# Patient Record
Sex: FEMALE | Race: WHITE | NOT HISPANIC OR LATINO | URBAN - METROPOLITAN AREA
[De-identification: names, ages, dates, MRNs, and addresses within clinical notes are randomized per-mention and may not be internally consistent; named-entity substitution may affect disease eponyms.]

---

## 2018-02-01 DIAGNOSIS — I10 ESSENTIAL HYPERTENSION: Primary | ICD-10-CM

## 2018-02-01 DIAGNOSIS — E78.5 HYPERLIPIDEMIA, UNSPECIFIED HYPERLIPIDEMIA TYPE: ICD-10-CM

## 2018-02-01 RX ORDER — ATORVASTATIN CALCIUM 20 MG/1
TABLET, FILM COATED ORAL
Qty: 90 TABLET | Refills: 0 | Status: SHIPPED | OUTPATIENT
Start: 2018-02-01 | End: 2018-05-08 | Stop reason: SDUPTHER

## 2018-02-01 RX ORDER — METFORMIN HYDROCHLORIDE 500 MG/1
TABLET ORAL
Qty: 180 TABLET | Refills: 0 | Status: SHIPPED | OUTPATIENT
Start: 2018-02-01 | End: 2018-05-08 | Stop reason: SDUPTHER

## 2018-03-01 ENCOUNTER — APPOINTMENT (RX ONLY)
Dept: URBAN - METROPOLITAN AREA CLINIC 61 | Facility: CLINIC | Age: 73
Setting detail: DERMATOLOGY
End: 2018-03-01

## 2018-03-01 DIAGNOSIS — L70.0 ACNE VULGARIS: ICD-10-CM

## 2018-03-01 PROCEDURE — ? MICRODERMABRASION

## 2018-03-01 ASSESSMENT — LOCATION DETAILED DESCRIPTION DERM
LOCATION DETAILED: RIGHT INFERIOR CENTRAL MALAR CHEEK
LOCATION DETAILED: LEFT INFERIOR CENTRAL MALAR CHEEK
LOCATION DETAILED: LEFT LOWER CUTANEOUS LIP
LOCATION DETAILED: SUPERIOR MID FOREHEAD

## 2018-03-01 ASSESSMENT — LOCATION SIMPLE DESCRIPTION DERM
LOCATION SIMPLE: SUPERIOR FOREHEAD
LOCATION SIMPLE: LEFT CHEEK
LOCATION SIMPLE: RIGHT CHEEK
LOCATION SIMPLE: LEFT LIP

## 2018-03-01 ASSESSMENT — LOCATION ZONE DERM
LOCATION ZONE: LIP
LOCATION ZONE: FACE

## 2018-03-01 NOTE — PROCEDURE: MICRODERMABRASION
Price (Use Numbers Only, No Special Characters Or $): [address]
Number Of Passes: 3
Wand: Medium
Consent: Written consent obtained, risks reviewed including but not limited to crusting, scabbing, blistering, scarring, darker or lighter pigmentary change, bruising, and/or incomplete response.
Endpoint: mild erythema
Prep Text: The patients skin was cleaned and prepped.
Detail Level: Zone
Indication: acne
Post-Care Instructions: I reviewed with the patient in detail post-care instructions. Patient should stay away from the sun and wear sun protection until treated areas are fully healed.
Vacuum Pressure: [address]
Treatment Number: 1
Vacuum Pressure Units: inches Hg

## 2018-03-14 ENCOUNTER — APPOINTMENT (RX ONLY)
Dept: URBAN - METROPOLITAN AREA CLINIC 61 | Facility: CLINIC | Age: 73
Setting detail: DERMATOLOGY
End: 2018-03-14

## 2018-03-14 DIAGNOSIS — L81.9 DISORDER OF PIGMENTATION, UNSPECIFIED: ICD-10-CM

## 2018-03-14 DIAGNOSIS — L70.0 ACNE VULGARIS: ICD-10-CM

## 2018-03-14 DIAGNOSIS — L81.4 OTHER MELANIN HYPERPIGMENTATION: ICD-10-CM

## 2018-03-14 PROCEDURE — ? CHEMICAL PEEL JESSNER/TCA

## 2018-03-14 PROCEDURE — 99213 OFFICE O/P EST LOW 20 MIN: CPT

## 2018-03-14 PROCEDURE — ? SEPARATE AND IDENTIFIABLE DOCUMENTATION

## 2018-03-14 PROCEDURE — ? COUNSELING

## 2018-03-14 NOTE — PROCEDURE: CHEMICAL PEEL JESSNER/TCA
Number Of Coats: 3
Time (Mins): 2
Post Peel Care: After the procedure, the treatment area was washed with soap and water, and a post-peel cream was applied. Sun protection and post-care instructions were reviewed with the patient.
Erythema: mild
Post-Care Instructions: I reviewed with the patient in detail post-care instructions. Patient should avoid sun exposure and wear sun protection.  Given strength 15% by  Ady Escobedo
Chemical Peel: 10% TCA
Detail Level: Zone
Consent: Prior to the procedure, written consent was obtained and risks were reviewed, including but not limited to: redness, peeling, blistering, pigmentary change, scarring, infection, and pain.
Frost (0,1+,2+,3+,4+): 0
Prep: The treated area was degreased with pre-peel cleanser, and vaseline was applied for protection of mucous membranes.

## 2018-03-19 DIAGNOSIS — I10 ESSENTIAL HYPERTENSION: Primary | ICD-10-CM

## 2018-03-22 RX ORDER — LOSARTAN POTASSIUM 50 MG/1
TABLET ORAL
Qty: 90 TABLET | Refills: 0 | Status: SHIPPED | OUTPATIENT
Start: 2018-03-22 | End: 2018-06-19 | Stop reason: SDUPTHER

## 2018-04-11 ENCOUNTER — APPOINTMENT (RX ONLY)
Dept: URBAN - METROPOLITAN AREA CLINIC 61 | Facility: CLINIC | Age: 73
Setting detail: DERMATOLOGY
End: 2018-04-11

## 2018-04-11 DIAGNOSIS — L81.1 CHLOASMA: ICD-10-CM

## 2018-04-11 PROCEDURE — ? COUNSELING

## 2018-04-11 PROCEDURE — ? TCA CHEMICAL PEEL

## 2018-04-11 PROCEDURE — 99213 OFFICE O/P EST LOW 20 MIN: CPT

## 2018-04-11 PROCEDURE — ? SEPARATE AND IDENTIFIABLE DOCUMENTATION

## 2018-04-11 NOTE — PROCEDURE: TCA CHEMICAL PEEL
Chemical Peel: 15% TCA
Time (Mins): 4
Consent: Prior to the procedure, written consent was obtained and risks were reviewed, including but not limited to: redness, peeling, blistering, pigmentary change, scarring, infection, and pain.
Prep: The treated area was degreased with pre-peel cleanser, and vaseline was applied for protection of mucous membranes.
Frost (0,1+,2+,3+,4+): 0
Number Of Coats: 3
Detail Level: Zone
Post-Care Instructions: I reviewed with the patient in detail post-care instructions. Patient should avoid sun exposure and wear sun protection.
Post Peel Care: After the procedure, the treatment area was washed with soap and water, and a post-peel cream was applied. Sun protection and post-care instructions were reviewed with the patient.

## 2018-04-24 DIAGNOSIS — E78.5 HYPERLIPIDEMIA, UNSPECIFIED HYPERLIPIDEMIA TYPE: ICD-10-CM

## 2018-04-24 DIAGNOSIS — E11.9 TYPE 2 DIABETES MELLITUS WITHOUT COMPLICATION, WITHOUT LONG-TERM CURRENT USE OF INSULIN (CMS/HCC): Primary | ICD-10-CM

## 2018-05-01 ENCOUNTER — APPOINTMENT (OUTPATIENT)
Dept: LAB | Facility: CLINIC | Age: 73
End: 2018-05-01
Payer: MEDICARE

## 2018-05-01 ENCOUNTER — OFFICE VISIT (OUTPATIENT)
Dept: FAMILY MEDICINE | Facility: CLINIC | Age: 73
End: 2018-05-01
Payer: MEDICARE

## 2018-05-01 VITALS
DIASTOLIC BLOOD PRESSURE: 78 MMHG | HEART RATE: 66 BPM | WEIGHT: 118 LBS | SYSTOLIC BLOOD PRESSURE: 132 MMHG | BODY MASS INDEX: 21.71 KG/M2 | RESPIRATION RATE: 14 BRPM | TEMPERATURE: 98.5 F | HEIGHT: 62 IN

## 2018-05-01 DIAGNOSIS — D50.8 OTHER IRON DEFICIENCY ANEMIA: ICD-10-CM

## 2018-05-01 DIAGNOSIS — E11.9 TYPE 2 DIABETES MELLITUS WITHOUT COMPLICATION, WITHOUT LONG-TERM CURRENT USE OF INSULIN (CMS/HCC): ICD-10-CM

## 2018-05-01 DIAGNOSIS — I10 ESSENTIAL HYPERTENSION: Primary | ICD-10-CM

## 2018-05-01 DIAGNOSIS — E78.2 MIXED HYPERLIPIDEMIA: ICD-10-CM

## 2018-05-01 DIAGNOSIS — E78.5 HYPERLIPIDEMIA, UNSPECIFIED HYPERLIPIDEMIA TYPE: ICD-10-CM

## 2018-05-01 PROBLEM — L57.0 ACTINIC KERATOSIS: Status: ACTIVE | Noted: 2018-05-01

## 2018-05-01 PROBLEM — J30.2 SEASONAL ALLERGIC RHINITIS: Status: ACTIVE | Noted: 2018-05-01

## 2018-05-01 PROBLEM — E55.9 VITAMIN D DEFICIENCY: Status: ACTIVE | Noted: 2018-05-01

## 2018-05-01 LAB
CHOLEST SERPL-MCNC: 114 MG/DL (ref 0–199)
EST. AVERAGE GLUCOSE BLD GHB EST-MCNC: 119.8 MG/DL
FERRITIN SERPL-MCNC: 17 NG/ML (ref 11–307)
HBA1C MFR BLD: 5.8 % (ref 4–6)
HDLC SERPL-MCNC: 67 MG/DL
IRON SERPL-MCNC: 42 UG/DL (ref 50–175)
LDLC SERPL CALC-MCNC: 24 MG/DL (ref 0–99)
TRIGL SERPL-MCNC: 113 MG/DL

## 2018-05-01 PROCEDURE — 83036 HEMOGLOBIN GLYCOSYLATED A1C: CPT | Mod: PO

## 2018-05-01 PROCEDURE — 99213 OFFICE O/P EST LOW 20 MIN: CPT | Mod: PO | Performed by: NURSE PRACTITIONER

## 2018-05-01 PROCEDURE — 83540 ASSAY OF IRON: CPT

## 2018-05-01 PROCEDURE — 80061 LIPID PANEL: CPT | Mod: PO

## 2018-05-01 PROCEDURE — 36415 COLL VENOUS BLD VENIPUNCTURE: CPT | Mod: PO

## 2018-05-01 PROCEDURE — 99213 OFFICE O/P EST LOW 20 MIN: CPT | Performed by: NURSE PRACTITIONER

## 2018-05-01 PROCEDURE — 82728 ASSAY OF FERRITIN: CPT

## 2018-05-01 RX ORDER — PSYLLIUM HUSK 0.4 G
1 CAPSULE ORAL DAILY
COMMUNITY
End: 2020-05-19

## 2018-05-01 RX ORDER — CHOLECALCIFEROL (VITAMIN D3) 25 MCG
2000 TABLET ORAL DAILY
COMMUNITY

## 2018-05-01 ASSESSMENT — ENCOUNTER SYMPTOMS
APPETITE CHANGE: 0
HEADACHES: 0
SORE THROAT: 0
ABDOMINAL DISTENTION: 0
ACTIVITY CHANGE: 0
FATIGUE: 0
SLEEP DISTURBANCE: 0
COUGH: 0
DIZZINESS: 0
FREQUENCY: 0

## 2018-05-01 ASSESSMENT — PAIN SCALES - GENERAL: PAINLEVEL: 0-NO PAIN

## 2018-05-01 NOTE — PROGRESS NOTES
HPI  She feels good, had stressful winter in [place] with relatives and their health.  Had labs done, A1C 5.8%.  Her mom is 94 and starting to have multiple problems.     ROS:  Review of Systems   Constitutional: Negative for activity change, appetite change and fatigue.   HENT: Negative for dental problem and sore throat.    Respiratory: Negative for cough.    Cardiovascular: Negative for leg swelling.   Gastrointestinal: Negative for abdominal distention.   Genitourinary: Negative for frequency and urgency.   Neurological: Negative for dizziness and headaches.   Psychiatric/Behavioral: Negative for sleep disturbance.    Feels good, denies SOB      PHYSICAL EXAM  Physical Exam  She looks well, skin warm and dry, HRR, lungs CTA, no edema, thyroid soft and nontender      DISCUSSION:  we should obtain a serum iron and ferritin level.  That has been added.  Discussion of her lipid panel and A1C.

## 2018-05-08 DIAGNOSIS — I10 ESSENTIAL HYPERTENSION: ICD-10-CM

## 2018-05-08 DIAGNOSIS — E78.5 HYPERLIPIDEMIA, UNSPECIFIED HYPERLIPIDEMIA TYPE: ICD-10-CM

## 2018-05-10 RX ORDER — METFORMIN HYDROCHLORIDE 500 MG/1
TABLET ORAL
Qty: 180 TABLET | Refills: 0 | Status: SHIPPED | OUTPATIENT
Start: 2018-05-10 | End: 2018-08-03 | Stop reason: SDUPTHER

## 2018-05-10 RX ORDER — ATORVASTATIN CALCIUM 20 MG/1
TABLET, FILM COATED ORAL
Qty: 90 TABLET | Refills: 0 | Status: SHIPPED | OUTPATIENT
Start: 2018-05-10 | End: 2018-08-03 | Stop reason: SDUPTHER

## 2018-06-19 DIAGNOSIS — I10 ESSENTIAL HYPERTENSION: ICD-10-CM

## 2018-06-20 RX ORDER — LOSARTAN POTASSIUM 50 MG/1
TABLET ORAL
Qty: 90 TABLET | Refills: 0 | Status: SHIPPED | OUTPATIENT
Start: 2018-06-20 | End: 2018-09-12 | Stop reason: SDUPTHER

## 2018-08-03 DIAGNOSIS — I10 ESSENTIAL HYPERTENSION: ICD-10-CM

## 2018-08-03 DIAGNOSIS — E78.5 HYPERLIPIDEMIA, UNSPECIFIED HYPERLIPIDEMIA TYPE: ICD-10-CM

## 2018-08-06 RX ORDER — METFORMIN HYDROCHLORIDE 500 MG/1
TABLET ORAL
Qty: 180 TABLET | Refills: 1 | Status: SHIPPED | OUTPATIENT
Start: 2018-08-06 | End: 2019-02-01 | Stop reason: SDUPTHER

## 2018-08-06 RX ORDER — ATORVASTATIN CALCIUM 20 MG/1
TABLET, FILM COATED ORAL
Qty: 90 TABLET | Refills: 1 | Status: SHIPPED | OUTPATIENT
Start: 2018-08-06 | End: 2019-02-01 | Stop reason: SDUPTHER

## 2018-09-12 DIAGNOSIS — I10 ESSENTIAL HYPERTENSION: ICD-10-CM

## 2018-09-12 RX ORDER — LOSARTAN POTASSIUM 50 MG/1
TABLET ORAL
Qty: 90 TABLET | Refills: 0 | Status: SHIPPED | OUTPATIENT
Start: 2018-09-12 | End: 2018-12-04 | Stop reason: SDUPTHER

## 2018-09-24 ENCOUNTER — TELEPHONE (OUTPATIENT)
Dept: FAMILY MEDICINE | Facility: CLINIC | Age: 73
End: 2018-09-24

## 2018-09-24 DIAGNOSIS — Z13.228 SCREENING FOR METABOLIC DISORDER: ICD-10-CM

## 2018-09-24 DIAGNOSIS — Z13.0 SCREENING, ANEMIA, DEFICIENCY, IRON: ICD-10-CM

## 2018-09-24 DIAGNOSIS — Z13.0 SCREENING FOR IRON DEFICIENCY ANEMIA: Primary | ICD-10-CM

## 2018-10-09 ENCOUNTER — APPOINTMENT (OUTPATIENT)
Dept: LAB | Facility: CLINIC | Age: 73
End: 2018-10-09
Payer: MEDICARE

## 2018-10-09 ENCOUNTER — OFFICE VISIT (OUTPATIENT)
Dept: FAMILY MEDICINE | Facility: CLINIC | Age: 73
End: 2018-10-09
Payer: MEDICARE

## 2018-10-09 VITALS
SYSTOLIC BLOOD PRESSURE: 102 MMHG | HEIGHT: 62 IN | WEIGHT: 114 LBS | DIASTOLIC BLOOD PRESSURE: 60 MMHG | TEMPERATURE: 98.8 F | HEART RATE: 90 BPM | RESPIRATION RATE: 14 BRPM | BODY MASS INDEX: 20.98 KG/M2

## 2018-10-09 DIAGNOSIS — Z13.0 SCREENING FOR IRON DEFICIENCY ANEMIA: ICD-10-CM

## 2018-10-09 DIAGNOSIS — R10.31 RLQ ABDOMINAL PAIN: Primary | ICD-10-CM

## 2018-10-09 DIAGNOSIS — Z13.228 SCREENING FOR METABOLIC DISORDER: ICD-10-CM

## 2018-10-09 DIAGNOSIS — D50.9 IRON DEFICIENCY ANEMIA, UNSPECIFIED IRON DEFICIENCY ANEMIA TYPE: ICD-10-CM

## 2018-10-09 DIAGNOSIS — I10 ESSENTIAL HYPERTENSION: ICD-10-CM

## 2018-10-09 DIAGNOSIS — Z13.0 SCREENING, ANEMIA, DEFICIENCY, IRON: ICD-10-CM

## 2018-10-09 DIAGNOSIS — E78.2 MIXED HYPERLIPIDEMIA: ICD-10-CM

## 2018-10-09 DIAGNOSIS — E11.9 TYPE 2 DIABETES MELLITUS WITHOUT COMPLICATION, WITHOUT LONG-TERM CURRENT USE OF INSULIN (CMS/HCC): ICD-10-CM

## 2018-10-09 LAB
ALBUMIN SERPL-MCNC: 3.8 G/DL (ref 3.5–5.3)
ALP SERPL-CCNC: 74 U/L (ref 55–142)
ALT SERPL-CCNC: 7 U/L (ref 0–52)
ANION GAP SERPL CALC-SCNC: 9 MMOL/L (ref 3–11)
AST SERPL-CCNC: 11 U/L (ref 0–39)
BASOPHILS # BLD AUTO: 0 10*3/UL
BASOPHILS NFR BLD AUTO: 1 % (ref 0–2)
BILIRUB SERPL-MCNC: 0.36 MG/DL (ref 0–1.4)
BUN SERPL-MCNC: 15 MG/DL (ref 7–25)
CALCIUM ALBUM COR SERPL-MCNC: 9.4 MG/DL (ref 8.5–10.1)
CALCIUM SERPL-MCNC: 9.2 MG/DL (ref 8.6–10.3)
CHLORIDE SERPL-SCNC: 104 MMOL/L (ref 98–107)
CO2 SERPL-SCNC: 25 MMOL/L (ref 21–32)
CREAT SERPL-MCNC: 0.58 MG/DL (ref 0.6–1.2)
EOSINOPHIL # BLD AUTO: 0.3 10*3/UL
EOSINOPHIL NFR BLD AUTO: 5 % (ref 0–3)
ERYTHROCYTE [DISTWIDTH] IN BLOOD BY AUTOMATED COUNT: 14 % (ref 11.5–14)
FERRITIN SERPL-MCNC: 9.4 NG/ML (ref 11–307)
GFR SERPL CREATININE-BSD FRML MDRD: 102 ML/MIN/1.73M*2
GLUCOSE SERPL-MCNC: 112 MG/DL (ref 70–105)
HCT VFR BLD AUTO: 35.2 % (ref 34–45)
HGB BLD-MCNC: 11.6 G/DL (ref 11.5–15.5)
HYPOCHROMIA PRESENCE IN BLOOD, ANALYZER: ABNORMAL
IRON SERPL-MCNC: 22 UG/DL (ref 50–175)
LYMPHOCYTES # BLD AUTO: 1.5 10*3/UL
LYMPHOCYTES NFR BLD AUTO: 23 % (ref 11–47)
MCH RBC QN AUTO: 26.4 PG (ref 28–33)
MCHC RBC AUTO-ENTMCNC: 32.9 G/DL (ref 32–36)
MCV RBC AUTO: 80.3 FL (ref 81–97)
MONOCYTES # BLD AUTO: 0.5 10*3/UL
MONOCYTES NFR BLD AUTO: 8 % (ref 3–11)
NEUTROPHILS # BLD AUTO: 4.1 10*3/UL
NEUTROPHILS NFR BLD AUTO: 64 % (ref 41–81)
PLATELET # BLD AUTO: 356 10*3/UL (ref 140–350)
PMV BLD AUTO: 6.7 FL (ref 6.9–10.8)
POTASSIUM SERPL-SCNC: 3.7 MMOL/L (ref 3.5–5.1)
PROT SERPL-MCNC: 6.7 G/DL (ref 6–8.3)
RBC # BLD AUTO: 4.38 10*6/ΜL (ref 3.7–5.3)
SODIUM SERPL-SCNC: 138 MMOL/L (ref 135–145)
WBC # BLD AUTO: 6.5 10*3/UL (ref 4.5–10.5)

## 2018-10-09 PROCEDURE — 82728 ASSAY OF FERRITIN: CPT

## 2018-10-09 PROCEDURE — 80053 COMPREHEN METABOLIC PANEL: CPT | Mod: PO

## 2018-10-09 PROCEDURE — 85025 COMPLETE CBC W/AUTO DIFF WBC: CPT | Mod: PO

## 2018-10-09 PROCEDURE — 99214 OFFICE O/P EST MOD 30 MIN: CPT | Performed by: NURSE PRACTITIONER

## 2018-10-09 PROCEDURE — 99214 OFFICE O/P EST MOD 30 MIN: CPT | Mod: PO | Performed by: NURSE PRACTITIONER

## 2018-10-09 PROCEDURE — 83540 ASSAY OF IRON: CPT

## 2018-10-09 PROCEDURE — 36415 COLL VENOUS BLD VENIPUNCTURE: CPT | Mod: PO

## 2018-10-09 ASSESSMENT — PAIN SCALES - GENERAL: PAINLEVEL: 4

## 2018-10-09 NOTE — PROGRESS NOTES
"VITAL SIGNS  /60 (BP Location: Left arm, Patient Position: Sitting, Cuff Size: Reg)   Pulse 90   Temp 37.1 °C (98.8 °F) (Temporal)   Resp 14   Ht 1.575 m (5' 2\")   Wt 51.7 kg (114 lb)   BMI 20.85 kg/m²   ALLERGIES  Patient has no known allergies.  MEDICATIONS    Current Outpatient Prescriptions:   •  atorvastatin (LIPITOR) 20 mg tablet, TAKE 1 TABLET BY MOUTH EVERY DAY, Disp: 90 tablet, Rfl: 1  •  blood-glucose meter (ACCU-CHEK JOSE MANUEL) misc, D, Disp: 1, Rfl: 0  •  cholecalciferol, vitamin D3, (cholecalciferol) 1,000 unit tablet, Take 2,000 Units by mouth daily., Disp: , Rfl:   •  ferrous sulfate (SLOW FE) 142 mg (45 mg iron) tablet extended release, Take 1 tablet by mouth daily., Disp: , Rfl:   •  lancets (ACCU-CHEK FASTCLIX) [facility], USE BID TO TEST BLOOD SUGAR LEVELS, Disp: 204, Rfl: 1  •  lancing device with lancets (ACCU-CHEK FASTCLIX) kit, Use as directed to check blood sugars twice daily., Disp: 100 each, Rfl: 3  •  losartan (COZAAR) 50 mg tablet, TAKE 1 TABLET BY MOUTH EVERY DAY, Disp: 90 tablet, Rfl: 0  •  metFORMIN (GLUCOPHAGE) 500 mg tablet, TAKE 1 TABLET BY MOUTH TWICE DAILY WITH MEALS, Disp: 180 tablet, Rfl: 1    HPI  Has a knot in RL abdomen.  Sore all the time, some days worse than others.  Worse with a bigger meal.  Taking two ibuprofen per day which seems to help the swelling. Noticed the knot about 2 months ago, noticed larger progressively.  Very concerned    Taking Slow Fe daily, not with vitamin C.  Iron has been low for about a year, was 28, then increased to 63, dropped to 42 5 months ago and now 22.  Ferritin was 4 a year ago, came up to 17 and is now below normal at 9.4.  Her MC has dropped to 80.3 and MCH to 26.4 but H and H barely normal at 11.6 and 35.2  She denies blood in urine, stools dark from iron, no blood noted.  Bowels have not changed, loose to firm.  Sometimes hurts after she has a BM.  Feels like she gets full faster for the last couple of months.  Denies fever.  "       ROS:  Review of Systems Denies feeling cold, SOB, doesn't feel heart beating fast.  No dizziness with standing up.        PHYSICAL EXAM  Physical Exam  She looks well, skin warm and dry, HRR, lungs CTA, abdomen is tender entire right side to palpation.      DISCUSSION: CT of abdomen.  Ask Dr Valdivia to look at labs, anemia.  Could do CT on Friday.  Creatinine was 0.58.  Will start taking iron with vitamin C.

## 2018-10-12 ENCOUNTER — HOSPITAL ENCOUNTER (OUTPATIENT)
Dept: CT IMAGING | Facility: HOSPITAL | Age: 73
Discharge: HOME | End: 2018-10-12
Payer: MEDICARE

## 2018-10-12 PROCEDURE — 2550000100 HC RX 255: Performed by: NURSE PRACTITIONER

## 2018-10-12 PROCEDURE — 74177 CT ABD & PELVIS W/CONTRAST: CPT

## 2018-10-12 PROCEDURE — 74177 CT ABD & PELVIS W/CONTRAST: CPT | Mod: 26 | Performed by: RADIOLOGY

## 2018-10-12 RX ORDER — IOPAMIDOL 755 MG/ML
70 INJECTION, SOLUTION INTRAVASCULAR ONCE
Status: COMPLETED | OUTPATIENT
Start: 2018-10-12 | End: 2018-10-12

## 2018-10-12 RX ADMIN — IOPAMIDOL 70 ML: 755 INJECTION, SOLUTION INTRAVENOUS at 10:40

## 2018-10-15 ENCOUNTER — TELEPHONE (OUTPATIENT)
Dept: FAMILY MEDICINE | Facility: CLINIC | Age: 73
End: 2018-10-15

## 2018-10-17 ENCOUNTER — TELEPHONE (OUTPATIENT)
Dept: FAMILY MEDICINE | Facility: CLINIC | Age: 73
End: 2018-10-17

## 2018-10-18 ENCOUNTER — CONSULT (OUTPATIENT)
Dept: SURGERY | Facility: CLINIC | Age: 73
End: 2018-10-18
Payer: MEDICARE

## 2018-10-18 ENCOUNTER — APPOINTMENT (OUTPATIENT)
Dept: LAB | Facility: CLINIC | Age: 73
End: 2018-10-18
Payer: MEDICARE

## 2018-10-18 VITALS
HEART RATE: 80 BPM | HEIGHT: 62 IN | RESPIRATION RATE: 16 BRPM | WEIGHT: 114 LBS | BODY MASS INDEX: 20.98 KG/M2 | DIASTOLIC BLOOD PRESSURE: 74 MMHG | TEMPERATURE: 98.8 F | SYSTOLIC BLOOD PRESSURE: 126 MMHG

## 2018-10-18 DIAGNOSIS — C18.0 MALIGNANT NEOPLASM OF CECUM (CMS/HCC): ICD-10-CM

## 2018-10-18 DIAGNOSIS — K63.9 DISEASE OF INTESTINE: Primary | ICD-10-CM

## 2018-10-18 DIAGNOSIS — K63.89 MASS OF CECUM: ICD-10-CM

## 2018-10-18 DIAGNOSIS — Z01.818 PREOP TESTING: ICD-10-CM

## 2018-10-18 LAB — CEA SERPL-MCNC: 1 NG/ML (ref 0–9.9)

## 2018-10-18 PROCEDURE — 82378 CARCINOEMBRYONIC ANTIGEN: CPT | Performed by: SURGERY

## 2018-10-18 PROCEDURE — 93005 ELECTROCARDIOGRAM TRACING: CPT | Performed by: SURGERY

## 2018-10-18 PROCEDURE — 36415 COLL VENOUS BLD VENIPUNCTURE: CPT | Performed by: SURGERY

## 2018-10-18 PROCEDURE — 99202 OFFICE O/P NEW SF 15 MIN: CPT | Performed by: SURGERY

## 2018-10-18 RX ORDER — VIT C/E/ZN/COPPR/LUTEIN/ZEAXAN 250MG-90MG
1 CAPSULE ORAL DAILY
COMMUNITY
End: 2018-10-18 | Stop reason: SDUPTHER

## 2018-10-18 ASSESSMENT — ENCOUNTER SYMPTOMS
ANAL BLEEDING: 0
CARDIOVASCULAR NEGATIVE: 1
VOMITING: 0
DIARRHEA: 1
NAUSEA: 0
CONSTIPATION: 1
ABDOMINAL PAIN: 1
ABDOMINAL DISTENTION: 1
RESPIRATORY NEGATIVE: 1
CONSTITUTIONAL NEGATIVE: 1
BLOOD IN STOOL: 0
RECTAL PAIN: 0

## 2018-10-18 NOTE — PROGRESS NOTES
History and Physical    10/18/18  3:51 PM    Chief Complaint   Patient presents with   • Mass     in colon on CT       HPI  Shama Caballero is a 73 y.o. female who presents with right lower quadrant pain and what she perceives as a palpable mass for the last several months.  Her iron has been low so she has been taking iron supplementation thus her stools have been dark.  She is taking quite a bit of ibuprofen to try to help with the pain    She is never had a colonoscopy.  No laly blood in her stools that she is noticed.  No fevers or chills.  She is down 4 pounds.  Her appetite is the same as it usually is.  Her bowels are very inconsistent but there is been no dramatic change.    Denies chest pain or shortness of breath with activities    There is no family history of colon cancer.    Past Medical History:   Diagnosis Date   • Diabetes mellitus (CMS/HCC) (HCC)    • Hypertension        Past Surgical History:   Procedure Laterality Date   • CATARACT EXTRACTION, BILATERAL     • HYSTERECTOMY     • TONSILLECTOMY         Family History   Problem Relation Age of Onset   • Hypertension Mother    • Hypertension Father        Social History     Social History   • Marital status:      Spouse name: N/A   • Number of children: N/A   • Years of education: N/A     Occupational History   • Not on file.     Social History Main Topics   • Smoking status: Never Smoker   • Smokeless tobacco: Never Used   • Alcohol use 4.2 oz/week     7 Glasses of wine per week   • Drug use: Unknown   • Sexual activity: Not on file     Other Topics Concern   • Not on file     Social History Narrative   • No narrative on file       No Known Allergies    Current Outpatient Prescriptions   Medication Sig Dispense Refill   • atorvastatin (LIPITOR) 20 mg tablet TAKE 1 TABLET BY MOUTH EVERY DAY 90 tablet 1   • blood-glucose meter (ACCU-CHEK JOSE MANUEL) misc FPD 1 0   • cholecalciferol, vitamin D3, (cholecalciferol) 1,000 unit tablet Take 2,000 Units by  mouth daily.     • ferrous sulfate (SLOW FE) 142 mg (45 mg iron) tablet extended release Take 1 tablet by mouth daily.     • lancets (ACCU-CHEK FASTCLIX) [facility] USE BID TO TEST BLOOD SUGAR LEVELS 204 1   • lancing device with lancets (ACCU-CHEK FASTCLIX) kit Use as directed to check blood sugars twice daily. 100 each 3   • metFORMIN (GLUCOPHAGE) 500 mg tablet TAKE 1 TABLET BY MOUTH TWICE DAILY WITH MEALS 180 tablet 1   • losartan (COZAAR) 50 mg tablet TAKE 1 TABLET BY MOUTH EVERY DAY 90 tablet 0     No current facility-administered medications for this visit.        Prior to Admission medications    Medication Sig Start Date End Date Taking? Authorizing Provider   atorvastatin (LIPITOR) 20 mg tablet TAKE 1 TABLET BY MOUTH EVERY DAY 8/6/18  Yes Christina Singh CNP   blood-glucose meter (ACCU-CHEK JOSE MANUEL) misc FPD 5/15/14  Yes Conversion Provider   cholecalciferol, vitamin D3, (cholecalciferol) 1,000 unit tablet Take 2,000 Units by mouth daily.   Yes Historical Provider, MD   ferrous sulfate (SLOW FE) 142 mg (45 mg iron) tablet extended release Take 1 tablet by mouth daily.   Yes Historical Provider, MD   lancets (ACC[facility]-CHEK FASTCLIX) [facility] USE BID TO TEST BLOOD SUGAR LEVELS 8/7/17  Yes Conversion Provider   lancing device with lancets (ACCU-CHEK FASTCLIX) kit Use as directed to check blood sugars twice daily. 11/20/17  Yes Christina Singh CNP   metFORMIN (GLUCOPHAGE) 500 mg tablet TAKE 1 TABLET BY MOUTH TWICE DAILY WITH MEALS 8/6/18  Yes Christina Singh CNP   losartan (COZAAR) 50 mg tablet TAKE 1 TABLET BY MOUTH EVERY DAY 9/12/18   Christina Singh CNP   cholecalciferol, vitamin D3, (VITAMIN D3) 1,000 unit capsule Take 1 capsule by mouth daily.  10/18/18  Historical Provider, MD       Review of Systems   Constitutional: Negative.    Respiratory: Negative.    Cardiovascular: Negative.    Gastrointestinal: Positive for abdominal distention, abdominal pain, constipation and diarrhea. Negative for anal bleeding, blood in stool, nausea,  rectal pain and vomiting.       Temp:  [37.1 °C (98.8 °F)] 37.1 °C (98.8 °F)  Heart Rate:  [80] 80  Resp:  [16] 16  BP: (126)/(74) 126/74    Physical Exam   Constitutional: She appears well-developed and well-nourished.   Cardiovascular: Normal rate, regular rhythm and normal heart sounds.    Pulmonary/Chest: Effort normal and breath sounds normal.   Abdominal: Soft. There is tenderness (Right lower quadrant).   Skin: Skin is warm and dry.   Vitals reviewed.      CBC with Platelet:    Lab Results   Component Value Date    WBC 6.5 10/09/2018    HGB 11.6 10/09/2018    HCT 35.2 10/09/2018     (H) 10/09/2018     Lab Results   Component Value Date    GLUCOSE 112 (H) 10/09/2018    CALCIUM 9.2 10/09/2018     10/09/2018    K 3.7 10/09/2018    CO2 25 10/09/2018     10/09/2018    BUN 15 10/09/2018    CREATININE 0.58 (L) 10/09/2018    ANIONGAP 9 10/09/2018     Magnesium: No results found for: MG  Coags: No results found for: PT, APTT, INR    Ct Abdomen Pelvis With Iv Contrast    Result Date: 10/12/2018  Narrative: Exam: CT of the abdomen and pelvis with contrast from 10/12/2018    Clinical History: RLQ abdominal pain; rlq abdominal pain  anemic Comparison(s): None Technique: Helical axial imaging was performed through the abdomen and pelvis after the intravenous administration of 70 cc of Isovue-370 and with enteric contrast. Coronal and sagittal reformatted images were generated. Dose Reduction Technique: Dose reduction technique utilized; automatic/anatomic modulation of X-ray tube current (Auto mA). Findings: There is dependent atelectasis. The spleen appears grossly unremarkable. There is no adrenal mass. There is diffuse hepatic steatosis. Cholecystectomy. The pancreas appears grossly unremarkable. The kidneys are symmetric in size and shape. There is no hydronephrosis. The abdominal aorta contains mild atherosclerotic calcification without aneurysm. There is no periaortic lymphadenopathy. There is a  small sliding-type hiatal hernia. There is an irregular-shaped circumferential mass of the cecum/proximal ascending colon (axial series 2 image 42). This measures over a length of approximately 6 cm, although is difficult to evaluate. There is a probable area of necrosis within the inferior aspect of the mass (axial series 2 image 46). There are enlarged lymph nodes within the right lower quadrant (axial series 2 image 47). Oral contrast is seen within the descending colon. The bladder appears unremarkable. The uterus is surgically absent. There are calcified pelvic phleboliths. There are surgical clips within the pelvis. There is no inguinal adenopathy. There are degenerative changes of the lumbar spine with facet arthropathy. There is a probable bone island within the L2 and L5 vertebral bodies.     Impression: IMPRESSION: Irregular wall thickening of the cecum and proximal ascending colon. This is most consistent with a colonic neoplasm. This extends over a length of approximately 6 cm. There is lymphadenopathy within the right lower quadrant.      Assessment and Plan  Active Problems:  No Active Problems: There are no active problems currently on the Problem List. Please update the Problem List and refresh.      Assessment/Plan      Abdominal mass    I have personally reviewed her CT scan.  She has an irregular mass/thickening of her cecum and proximal ascending colon.  There is no signs for metastatic disease throughout the abdomen    I told her that we need to urgently proceed to colonoscopy for further diagnosis.  Certainly the #1 differential would be carcinoma.  Second would be possible inflammatory bowel disease.  I discussed both of these options with her today and have let her know that I feel that this is likely a cancer    We are going obtain a CEA and EKG today.  I reviewed her other blood work.  This is in to see a patient of likely surgery.  I did give her printed literature today describing  laparoscopic approach to a right hemicolectomy.  We discussed staging of colon cancers and both local therapy such as surgery and systemic therapy such as chemotherapy.    I have her scheduled for colonoscopy tomorrow.  Risks, benefits, alternatives to this procedure were explained.  Further recommendations pending outcome of biopsies.      DINESH RODRIGUEZ MD

## 2018-10-18 NOTE — H&P (VIEW-ONLY)
History and Physical    10/18/18  3:51 PM    Chief Complaint   Patient presents with   • Mass     in colon on CT       HPI  Shama Caballero is a 73 y.o. female who presents with right lower quadrant pain and what she perceives as a palpable mass for the last several months.  Her iron has been low so she has been taking iron supplementation thus her stools have been dark.  She is taking quite a bit of ibuprofen to try to help with the pain    She is never had a colonoscopy.  No laly blood in her stools that she is noticed.  No fevers or chills.  She is down 4 pounds.  Her appetite is the same as it usually is.  Her bowels are very inconsistent but there is been no dramatic change.    Denies chest pain or shortness of breath with activities    There is no family history of colon cancer.    Past Medical History:   Diagnosis Date   • Diabetes mellitus (CMS/HCC) (HCC)    • Hypertension        Past Surgical History:   Procedure Laterality Date   • CATARACT EXTRACTION, BILATERAL     • HYSTERECTOMY     • TONSILLECTOMY         Family History   Problem Relation Age of Onset   • Hypertension Mother    • Hypertension Father        Social History     Social History   • Marital status:      Spouse name: N/A   • Number of children: N/A   • Years of education: N/A     Occupational History   • Not on file.     Social History Main Topics   • Smoking status: Never Smoker   • Smokeless tobacco: Never Used   • Alcohol use 4.2 oz/week     7 Glasses of wine per week   • Drug use: Unknown   • Sexual activity: Not on file     Other Topics Concern   • Not on file     Social History Narrative   • No narrative on file       No Known Allergies    Current Outpatient Prescriptions   Medication Sig Dispense Refill   • atorvastatin (LIPITOR) 20 mg tablet TAKE 1 TABLET BY MOUTH EVERY DAY 90 tablet 1   • blood-glucose meter (ACCU-CHEK JOSE MANUEL) misc FPD 1 0   • cholecalciferol, vitamin D3, (cholecalciferol) 1,000 unit tablet Take 2,000 Units by  mouth daily.     • ferrous sulfate (SLOW FE) 142 mg (45 mg iron) tablet extended release Take 1 tablet by mouth daily.     • lancets (ACCU-CHEK FASTCLIX) [facility] USE BID TO TEST BLOOD SUGAR LEVELS 204 1   • lancing device with lancets (ACCU-CHEK FASTCLIX) kit Use as directed to check blood sugars twice daily. 100 each 3   • metFORMIN (GLUCOPHAGE) 500 mg tablet TAKE 1 TABLET BY MOUTH TWICE DAILY WITH MEALS 180 tablet 1   • losartan (COZAAR) 50 mg tablet TAKE 1 TABLET BY MOUTH EVERY DAY 90 tablet 0     No current facility-administered medications for this visit.        Prior to Admission medications    Medication Sig Start Date End Date Taking? Authorizing Provider   atorvastatin (LIPITOR) 20 mg tablet TAKE 1 TABLET BY MOUTH EVERY DAY 8/6/18  Yes Christina Singh CNP   blood-glucose meter (ACCU-CHEK JOSE MANUEL) misc FPD 5/15/14  Yes Conversion Provider   cholecalciferol, vitamin D3, (cholecalciferol) 1,000 unit tablet Take 2,000 Units by mouth daily.   Yes Historical Provider, MD   ferrous sulfate (SLOW FE) 142 mg (45 mg iron) tablet extended release Take 1 tablet by mouth daily.   Yes Historical Provider, MD   lancets (ACCU-CHEK FASTCLIX) [facility] USE BID TO TEST BLOOD SUGAR LEVELS 8/7/17  Yes Conversion Provider   lancing device with lancets (ACCU-CHEK FASTCLIX) kit Use as directed to check blood sugars twice daily. 11/20/17  Yes Christina Singh CNP   metFORMIN (GLUCOPHAGE) 500 mg tablet TAKE 1 TABLET BY MOUTH TWICE DAILY WITH MEALS 8/6/18  Yes Christina Singh CNP   losartan (COZAAR) 50 mg tablet TAKE 1 TABLET BY MOUTH EVERY DAY 9/12/18   Christina Singh CNP   cholecalciferol, vitamin D3, (VITAMIN D3) 1,000 unit capsule Take 1 capsule by mouth daily.  10/18/18  Historical Provider, MD       Review of Systems   Constitutional: Negative.    Respiratory: Negative.    Cardiovascular: Negative.    Gastrointestinal: Positive for abdominal distention, abdominal pain, constipation and diarrhea. Negative for anal bleeding, blood in stool, nausea,  rectal pain and vomiting.       Temp:  [37.1 °C (98.8 °F)] 37.1 °C (98.8 °F)  Heart Rate:  [80] 80  Resp:  [16] 16  BP: (126)/(74) 126/74    Physical Exam   Constitutional: She appears well-developed and well-nourished.   Cardiovascular: Normal rate, regular rhythm and normal heart sounds.    Pulmonary/Chest: Effort normal and breath sounds normal.   Abdominal: Soft. There is tenderness (Right lower quadrant).   Skin: Skin is warm and dry.   Vitals reviewed.      CBC with Platelet:    Lab Results   Component Value Date    WBC 6.5 10/09/2018    HGB 11.6 10/09/2018    HCT 35.2 10/09/2018     (H) 10/09/2018     Lab Results   Component Value Date    GLUCOSE 112 (H) 10/09/2018    CALCIUM 9.2 10/09/2018     10/09/2018    K 3.7 10/09/2018    CO2 25 10/09/2018     10/09/2018    BUN 15 10/09/2018    CREATININE 0.58 (L) 10/09/2018    ANIONGAP 9 10/09/2018     Magnesium: No results found for: MG  Coags: No results found for: PT, APTT, INR    Ct Abdomen Pelvis With Iv Contrast    Result Date: 10/12/2018  Narrative: Exam: CT of the abdomen and pelvis with contrast from 10/12/2018    Clinical History: RLQ abdominal pain; rlq abdominal pain  anemic Comparison(s): None Technique: Helical axial imaging was performed through the abdomen and pelvis after the intravenous administration of 70 cc of Isovue-370 and with enteric contrast. Coronal and sagittal reformatted images were generated. Dose Reduction Technique: Dose reduction technique utilized; automatic/anatomic modulation of X-ray tube current (Auto mA). Findings: There is dependent atelectasis. The spleen appears grossly unremarkable. There is no adrenal mass. There is diffuse hepatic steatosis. Cholecystectomy. The pancreas appears grossly unremarkable. The kidneys are symmetric in size and shape. There is no hydronephrosis. The abdominal aorta contains mild atherosclerotic calcification without aneurysm. There is no periaortic lymphadenopathy. There is a  small sliding-type hiatal hernia. There is an irregular-shaped circumferential mass of the cecum/proximal ascending colon (axial series 2 image 42). This measures over a length of approximately 6 cm, although is difficult to evaluate. There is a probable area of necrosis within the inferior aspect of the mass (axial series 2 image 46). There are enlarged lymph nodes within the right lower quadrant (axial series 2 image 47). Oral contrast is seen within the descending colon. The bladder appears unremarkable. The uterus is surgically absent. There are calcified pelvic phleboliths. There are surgical clips within the pelvis. There is no inguinal adenopathy. There are degenerative changes of the lumbar spine with facet arthropathy. There is a probable bone island within the L2 and L5 vertebral bodies.     Impression: IMPRESSION: Irregular wall thickening of the cecum and proximal ascending colon. This is most consistent with a colonic neoplasm. This extends over a length of approximately 6 cm. There is lymphadenopathy within the right lower quadrant.      Assessment and Plan  Active Problems:  No Active Problems: There are no active problems currently on the Problem List. Please update the Problem List and refresh.      Assessment/Plan      Abdominal mass    I have personally reviewed her CT scan.  She has an irregular mass/thickening of her cecum and proximal ascending colon.  There is no signs for metastatic disease throughout the abdomen    I told her that we need to urgently proceed to colonoscopy for further diagnosis.  Certainly the #1 differential would be carcinoma.  Second would be possible inflammatory bowel disease.  I discussed both of these options with her today and have let her know that I feel that this is likely a cancer    We are going obtain a CEA and EKG today.  I reviewed her other blood work.  This is in to see a patient of likely surgery.  I did give her printed literature today describing  laparoscopic approach to a right hemicolectomy.  We discussed staging of colon cancers and both local therapy such as surgery and systemic therapy such as chemotherapy.    I have her scheduled for colonoscopy tomorrow.  Risks, benefits, alternatives to this procedure were explained.  Further recommendations pending outcome of biopsies.      DINESH RODRIGUEZ MD

## 2018-10-19 ENCOUNTER — ANESTHESIA (OUTPATIENT)
Dept: GASTROENTEROLOGY | Facility: HOSPITAL | Age: 73
End: 2018-10-19
Payer: MEDICARE

## 2018-10-19 ENCOUNTER — ANESTHESIA EVENT (OUTPATIENT)
Dept: GASTROENTEROLOGY | Facility: HOSPITAL | Age: 73
End: 2018-10-19
Payer: MEDICARE

## 2018-10-19 ENCOUNTER — HOSPITAL ENCOUNTER (OUTPATIENT)
Facility: HOSPITAL | Age: 73
Setting detail: OUTPATIENT SURGERY
Discharge: HOME | End: 2018-10-19
Attending: SURGERY | Admitting: SURGERY
Payer: MEDICARE

## 2018-10-19 VITALS
RESPIRATION RATE: 16 BRPM | WEIGHT: 114 LBS | SYSTOLIC BLOOD PRESSURE: 113 MMHG | DIASTOLIC BLOOD PRESSURE: 59 MMHG | BODY MASS INDEX: 20.98 KG/M2 | OXYGEN SATURATION: 96 % | HEART RATE: 83 BPM | HEIGHT: 62 IN | TEMPERATURE: 98.1 F

## 2018-10-19 PROBLEM — C18.2 MALIGNANT NEOPLASM OF ASCENDING COLON (CMS/HCC): Status: ACTIVE | Noted: 2018-10-19

## 2018-10-19 LAB — GLUCOSE BLDC GLUCOMTR-MCNC: 101 MG/DL (ref 70–105)

## 2018-10-19 PROCEDURE — 2580000300 HC RX 258: Performed by: SURGERY

## 2018-10-19 PROCEDURE — 88305 TISSUE EXAM BY PATHOLOGIST: CPT | Performed by: NURSE ANESTHETIST, CERTIFIED REGISTERED

## 2018-10-19 PROCEDURE — 82962 GLUCOSE BLOOD TEST: CPT

## 2018-10-19 PROCEDURE — 00811 ANES LWR INTST NDSC NOS: CPT | Performed by: NURSE ANESTHETIST, CERTIFIED REGISTERED

## 2018-10-19 PROCEDURE — (BLANK): Performed by: SURGERY

## 2018-10-19 PROCEDURE — 45380 COLONOSCOPY AND BIOPSY: CPT | Mod: PT | Performed by: SURGERY

## 2018-10-19 PROCEDURE — (BLANK) HC RECOVERY PHASE-2 1ST 1/2 HOUR ACUITY LEVEL 2: Performed by: SURGERY

## 2018-10-19 PROCEDURE — 99100 ANES PT EXTEME AGE<1 YR&>70: CPT | Performed by: NURSE ANESTHETIST, CERTIFIED REGISTERED

## 2018-10-19 PROCEDURE — (BLANK) HC RECOVERY PHASE-2 EACH ADDITIONAL 1/2 HOUR ACUITY LEVEL 2: Performed by: SURGERY

## 2018-10-19 PROCEDURE — 6360000200 HC RX 636 W HCPCS (ALT 250 FOR IP): Mod: JW | Performed by: NURSE ANESTHETIST, CERTIFIED REGISTERED

## 2018-10-19 RX ORDER — FENTANYL CITRATE/PF 50 MCG/ML
25-200 PLASTIC BAG, INJECTION (ML) INTRAVENOUS ONCE
Status: DISCONTINUED | OUTPATIENT
Start: 2018-10-19 | End: 2018-10-19 | Stop reason: HOSPADM

## 2018-10-19 RX ORDER — SODIUM CHLORIDE, SODIUM LACTATE, POTASSIUM CHLORIDE, CALCIUM CHLORIDE 600; 310; 30; 20 MG/100ML; MG/100ML; MG/100ML; MG/100ML
150 INJECTION, SOLUTION INTRAVENOUS CONTINUOUS
Status: DISCONTINUED | OUTPATIENT
Start: 2018-10-19 | End: 2018-10-19 | Stop reason: HOSPADM

## 2018-10-19 RX ORDER — MIDAZOLAM HYDROCHLORIDE 1 MG/ML
1-10 INJECTION INTRAMUSCULAR; INTRAVENOUS ONCE
Status: DISCONTINUED | OUTPATIENT
Start: 2018-10-19 | End: 2018-10-19 | Stop reason: HOSPADM

## 2018-10-19 RX ORDER — ONDANSETRON HYDROCHLORIDE 2 MG/ML
INJECTION, SOLUTION INTRAVENOUS AS NEEDED
Status: DISCONTINUED | OUTPATIENT
Start: 2018-10-19 | End: 2018-10-19 | Stop reason: SURG

## 2018-10-19 RX ORDER — SODIUM CHLORIDE 9 MG/ML
INJECTION INTRAMUSCULAR; INTRAVENOUS; SUBCUTANEOUS
Status: DISCONTINUED
Start: 2018-10-19 | End: 2018-10-19 | Stop reason: HOSPADM

## 2018-10-19 RX ORDER — PROPOFOL 10 MG/ML
INJECTION, EMULSION INTRAVENOUS AS NEEDED
Status: DISCONTINUED | OUTPATIENT
Start: 2018-10-19 | End: 2018-10-19 | Stop reason: SURG

## 2018-10-19 RX ADMIN — PROPOFOL 20 MG: 10 INJECTION, EMULSION INTRAVENOUS at 11:12

## 2018-10-19 RX ADMIN — PROPOFOL 20 MG: 10 INJECTION, EMULSION INTRAVENOUS at 11:06

## 2018-10-19 RX ADMIN — PROPOFOL 20 MG: 10 INJECTION, EMULSION INTRAVENOUS at 11:16

## 2018-10-19 RX ADMIN — PROPOFOL 20 MG: 10 INJECTION, EMULSION INTRAVENOUS at 11:14

## 2018-10-19 RX ADMIN — PROPOFOL 20 MG: 10 INJECTION, EMULSION INTRAVENOUS at 11:05

## 2018-10-19 RX ADMIN — PROPOFOL 20 MG: 10 INJECTION, EMULSION INTRAVENOUS at 11:08

## 2018-10-19 RX ADMIN — SODIUM CHLORIDE, POTASSIUM CHLORIDE, SODIUM LACTATE AND CALCIUM CHLORIDE 150 ML/HR: 600; 310; 30; 20 INJECTION, SOLUTION INTRAVENOUS at 09:52

## 2018-10-19 RX ADMIN — ONDANSETRON 4 MG: 2 INJECTION INTRAMUSCULAR; INTRAVENOUS at 10:58

## 2018-10-19 RX ADMIN — PROPOFOL 20 MG: 10 INJECTION, EMULSION INTRAVENOUS at 11:10

## 2018-10-19 ASSESSMENT — PAIN DESCRIPTION - DESCRIPTORS: DESCRIPTORS: ACHING

## 2018-10-19 ASSESSMENT — ENCOUNTER SYMPTOMS: SHORTNESS OF BREATH: 1

## 2018-10-19 NOTE — ANESTHESIA POSTPROCEDURE EVALUATION
Patient: Shama Caballero    Procedure Summary     Date:  10/19/18 Room / Location:  [facility] ENDO 02 / [facility]    Anesthesia Start:  1105 Anesthesia Stop:  1122    Procedure:  COLONOSCOPY with Anesthesia with Cecal Mass Biopsies (N/A Anus) Diagnosis:       Mass of cecum      (Cecal Mass)    Provider:  Tremaine Rodriguez MD Responsible Provider:  Lona Pedersen CRNA    Anesthesia Type:  MAC ASA Status:  2          Anesthesia Type: MAC  Last vitals  BP      Temp      Pulse     Resp      SpO2      Pain Score      vss  Anesthesia Post Evaluation    Patient location during evaluation: PACU  Patient participation: complete - patient participated  Level of consciousness: sleepy but conscious  Pain management: adequate  Airway patency: patent  Anesthetic complications: no  Cardiovascular status: acceptable  Respiratory status: acceptable  Hydration status: acceptable  May dismiss recovered patient based on consultation with the appropriate physicians and/or meeting appropriate discharge criteria

## 2018-10-19 NOTE — ANESTHESIA PREPROCEDURE EVALUATION
"Pre-Procedure Assessment    Patient: Shama Caballero, female, 73 y.o.    Ht Readings from Last 1 Encounters:   10/18/18 1.575 m (5' 2\")     Wt Readings from Last 1 Encounters:   10/18/18 51.7 kg (114 lb)       Last Vitals  BP      Temp      Pulse     Resp      SpO2      Pain Score         Problem list reviewed and Medical history reviewed    History of anesthetic complications: PONV         Airway   Mallampati: II  TM distance: >3 FB  Neck ROM: full      Dental - normal exam     Pulmonary - normal exam   (+) shortness of breath (with activity),   Cardiovascular - normal exam  (+) hypertension well controlled,     ECG reviewed  Rhythm: regular  Rate: normal  ROS comment: 10/18/18  Sinus rhythm  . Minimal ST depression, lateral leads    Mental Status/Neuro/Psych    Pt is alert.        GI/Hepatic/Renal - negative ROS     Endo/Other    (+) diabetes mellitus type 2 well controlled,   Abdominal  - normal exam          Social History   Substance Use Topics   • Smoking status: Never Smoker   • Smokeless tobacco: Never Used   • Alcohol use 4.2 oz/week     7 Glasses of wine per week      Hematology   Lab Results   Component Value Date/Time    WBC 6.5 10/09/2018 07:20 AM    RBC 4.38 10/09/2018 07:20 AM    MCV 80.3 (L) 10/09/2018 07:20 AM    HGB 11.6 10/09/2018 07:20 AM    HCT 35.2 10/09/2018 07:20 AM     (H) 10/09/2018 07:20 AM      Coagulation No results found for: PT, APTT, INR   General Chemistry   Lab Results   Component Value Date/Time    CALCIUM 9.2 10/09/2018 07:20 AM    BUN 15 10/09/2018 07:20 AM    CREATININE 0.58 (L) 10/09/2018 07:20 AM    GLUCOSE 112 (H) 10/09/2018 07:20 AM     10/09/2018 07:20 AM    K 3.7 10/09/2018 07:20 AM    CO2 25 10/09/2018 07:20 AM     10/09/2018 07:20 AM     Anesthesia Plan    ASA 2   NPO status reviewed: > 8 hours    MAC                          Anesthetic plan and risks discussed with patient.                "

## 2018-10-19 NOTE — H&P (VIEW-ONLY)
History and Physical    10/18/18  3:51 PM    Chief Complaint   Patient presents with   • Mass     in colon on CT       HPI  Shama Caballero is a 73 y.o. female who presents with right lower quadrant pain and what she perceives as a palpable mass for the last several months.  Her iron has been low so she has been taking iron supplementation thus her stools have been dark.  She is taking quite a bit of ibuprofen to try to help with the pain    She is never had a colonoscopy.  No laly blood in her stools that she is noticed.  No fevers or chills.  She is down 4 pounds.  Her appetite is the same as it usually is.  Her bowels are very inconsistent but there is been no dramatic change.    Denies chest pain or shortness of breath with activities    There is no family history of colon cancer.    Past Medical History:   Diagnosis Date   • Diabetes mellitus (CMS/HCC) (HCC)    • Hypertension        Past Surgical History:   Procedure Laterality Date   • CATARACT EXTRACTION, BILATERAL     • HYSTERECTOMY     • TONSILLECTOMY         Family History   Problem Relation Age of Onset   • Hypertension Mother    • Hypertension Father        Social History     Social History   • Marital status:      Spouse name: N/A   • Number of children: N/A   • Years of education: N/A     Occupational History   • Not on file.     Social History Main Topics   • Smoking status: Never Smoker   • Smokeless tobacco: Never Used   • Alcohol use 4.2 oz/week     7 Glasses of wine per week   • Drug use: Unknown   • Sexual activity: Not on file     Other Topics Concern   • Not on file     Social History Narrative   • No narrative on file       No Known Allergies    Current Outpatient Prescriptions   Medication Sig Dispense Refill   • atorvastatin (LIPITOR) 20 mg tablet TAKE 1 TABLET BY MOUTH EVERY DAY 90 tablet 1   • blood-glucose meter (ACCU-CHEK JOSE MANUEL) misc FPD 1 0   • cholecalciferol, vitamin D3, (cholecalciferol) 1,000 unit tablet Take 2,000 Units by  mouth daily.     • ferrous sulfate (SLOW FE) 142 mg (45 mg iron) tablet extended release Take 1 tablet by mouth daily.     • lancets (ACCU-[place]K FASTCLIX[place] [facility] [place] TO [place] BLOOD SUGAR LEVELS 204 1   • lancing device with lancets (ACCU-CHEK FASTCLIX) kit Use as directed to check blood sugars twice daily. 100 each 3   • metFORMIN (GLUCOPHAGE) 500 mg tablet TAKE 1 TABLET BY MOUTH TWICE DAILY WITH MEALS 180 tablet 1   • losartan (COZAAR) 50 mg tablet TAKE 1 TABLET BY MOUTH EVERY DAY 90 tablet 0     No current facility-administered medications for this visit.        Prior to Admission medications    Medication Sig Start Date End Date Taking? Authorizing Provider   atorvastatin (LIPITOR) 20 mg tablet TAKE 1 TABLET BY MOUTH EVERY DAY 8/6/18  Yes Christina Singh CNP   blood-glucose meter (ACCU-CHEK JOSE MANUEL) misc FPD 5/15/14  Yes Conversion Provider   cholecalciferol, vitamin D3, (cholecalciferol) 1,000 unit tablet Take 2,000 Units by mouth daily.   Yes Historical Provider, MD   ferrous sulfate (SLOW FE) 142 mg (45 mg iron) tablet extended release Take 1 tablet by mouth daily.   Yes Historical Provider, MD   lancets (ACCU-CHEK FASTCLIX) [facility] USE BID TO TEST BLOOD SUGAR LEVELS 8/7/17  Yes Conversion Provider   lancing device with lancets (ACCU-CHEK FASTCLIX) kit Use as directed to check blood sugars twice daily. 11/20/17  Yes Christina Singh CNP   metFORMIN (GLUCOPHAGE) 500 mg tablet TAKE 1 TABLET BY MOUTH TWICE DAILY WITH MEALS 8/6/18  Yes Christina Singh CNP   losartan (COZAAR) 50 mg tablet TAKE 1 TABLET BY MOUTH EVERY DAY 9/12/18   Christina Singh CNP   cholecalciferol, vitamin D3, (VITAMIN D3) 1,000 unit capsule Take 1 capsule by mouth daily.  10/18/18  Historical Provider, MD       Review of Systems   Constitutional: Negative.    Respiratory: Negative.    Cardiovascular: Negative.    Gastrointestinal: Positive for abdominal distention, abdominal pain, constipation and diarrhea. Negative for anal bleeding, blood in stool, nausea,  rectal pain and vomiting.       Temp:  [37.1 °C (98.8 °F)] 37.1 °C (98.8 °F)  Heart Rate:  [80] 80  Resp:  [16] 16  BP: (126)/(74) 126/74    Physical Exam   Constitutional: She appears well-developed and well-nourished.   Cardiovascular: Normal rate, regular rhythm and normal heart sounds.    Pulmonary/Chest: Effort normal and breath sounds normal.   Abdominal: Soft. There is tenderness (Right lower quadrant).   Skin: Skin is warm and dry.   Vitals reviewed.      CBC with Platelet:    Lab Results   Component Value Date    WBC 6.5 10/09/2018    HGB 11.6 10/09/2018    HCT 35.2 10/09/2018     (H) 10/09/2018     Lab Results   Component Value Date    GLUCOSE 112 (H) 10/09/2018    CALCIUM 9.2 10/09/2018     10/09/2018    K 3.7 10/09/2018    CO2 25 10/09/2018     10/09/2018    BUN 15 10/09/2018    CREATININE 0.58 (L) 10/09/2018    ANIONGAP 9 10/09/2018     Magnesium: No results found for: MG  Coags: No results found for: PT, APTT, INR    Ct Abdomen Pelvis With Iv Contrast    Result Date: 10/12/2018  Narrative: Exam: CT of the abdomen and pelvis with contrast from 10/12/2018    Clinical History: RLQ abdominal pain; rlq abdominal pain  anemic Comparison(s): None Technique: Helical axial imaging was performed through the abdomen and pelvis after the intravenous administration of 70 cc of Isovue-370 and with enteric contrast. Coronal and sagittal reformatted images were generated. Dose Reduction Technique: Dose reduction technique utilized; automatic/anatomic modulation of X-ray tube current (Auto mA). Findings: There is dependent atelectasis. The spleen appears grossly unremarkable. There is no adrenal mass. There is diffuse hepatic steatosis. Cholecystectomy. The pancreas appears grossly unremarkable. The kidneys are symmetric in size and shape. There is no hydronephrosis. The abdominal aorta contains mild atherosclerotic calcification without aneurysm. There is no periaortic lymphadenopathy. There is a  small sliding-type hiatal hernia. There is an irregular-shaped circumferential mass of the cecum/proximal ascending colon (axial series 2 image 42). This measures over a length of approximately 6 cm, although is difficult to evaluate. There is a probable area of necrosis within the inferior aspect of the mass (axial series 2 image 46). There are enlarged lymph nodes within the right lower quadrant (axial series 2 image 47). Oral contrast is seen within the descending colon. The bladder appears unremarkable. The uterus is surgically absent. There are calcified pelvic phleboliths. There are surgical clips within the pelvis. There is no inguinal adenopathy. There are degenerative changes of the lumbar spine with facet arthropathy. There is a probable bone island within the L2 and L5 vertebral bodies.     Impression: IMPRESSION: Irregular wall thickening of the cecum and proximal ascending colon. This is most consistent with a colonic neoplasm. This extends over a length of approximately 6 cm. There is lymphadenopathy within the right lower quadrant.      Assessment and Plan  Active Problems:  No Active Problems: There are no active problems currently on the Problem List. Please update the Problem List and refresh.      Assessment/Plan      Abdominal mass    I have personally reviewed her CT scan.  She has an irregular mass/thickening of her cecum and proximal ascending colon.  There is no signs for metastatic disease throughout the abdomen    I told her that we need to urgently proceed to colonoscopy for further diagnosis.  Certainly the #1 differential would be carcinoma.  Second would be possible inflammatory bowel disease.  I discussed both of these options with her today and have let her know that I feel that this is likely a cancer    We are going obtain a CEA and EKG today.  I reviewed her other blood work.  This is in to see a patient of likely surgery.  I did give her printed literature today describing  laparoscopic approach to a right hemicolectomy.  We discussed staging of colon cancers and both local therapy such as surgery and systemic therapy such as chemotherapy.    I have her scheduled for colonoscopy tomorrow.  Risks, benefits, alternatives to this procedure were explained.  Further recommendations pending outcome of biopsies.      DINESH RODRIGUEZ MD

## 2018-10-19 NOTE — PERIOPERATIVE NURSING NOTE
VSS, tolerating PO with no difficulty, no c/o pain. Prescriptions called to [facility] in [place], patient and spouse aware they will need to  prior to surgery next week. Discharge instructions reviewed, both parties verbalize understanding. All questions and concerns addressed.

## 2018-10-19 NOTE — OP NOTE
Operative Note    Shama Caballero  10/19/18    PREOPERATIVE DIAGNOSIS:  Pre-op Diagnosis     * Mass of cecum [K63.9]    POSTOPERATIVE DIAGNOSIS: Malignancy of the cecum and proximal ascending colon    PROCEDURES:    Procedure:    COLONOSCOPY with Anesthesia with Cecal Mass Biopsies  CPT(R) Code:  78275 - [facility] COLONOSCOPY FLX DX W/COLLJ SPEC WHEN PFRMD      SURGEON: Surgeon(s):  Tremaine Rodriguez MD    ANESTHESIA TYPE: Monitored anesthesia care with propofol    No blood loss documented.    SPECIMENS:   Order Name Source Comment Collection Info Order Time   PATHOLOGY SPECIMEN (HISTOLOGY) Other  Collected By: Tremaine Rodriguez MD 10/19/2018 11:14 AM       COMPLICATIONS:  None    INDICATIONS: Anemia, right lower quadrant mass on CT scan    FINDINGS: Fungating mass consistent with malignancy of the cecum and ascending colon proximal    DESCRIPTION OF PROCEDURE: After informed consent was obtained patient was brought to the endoscopy suite and rolled the left lateral decubitus position.  Preoperative pause is undertaken confirming patient, site, procedure, antibiotics, allergies    Monitored anesthesia care was then induced.  Digital rectal exam was performed which was normal.  Colonoscope was inserted carefully advanced all the way to the proximal ascending colon.  Here there was a fungating mass encompassing 360 degrees of the colon.  The scope could not be passed through this as there was a significant narrowing of the colonic lumen.    Multiple biopsies of this was taken to confirm the obvious malignancy.  The remainder the colon was then carefully examined.  The remainder of the colon is free of any polyps inflammation or infection.  Colon was desufflated and scope was removed.  Patient tolerated procedure well    I discussed the findings with her and her .  We are going to expedite surgery.  Plan is for laparoscopic right hemicolectomy next week.  Preoperative clearance has been given by Dr. Valdivia.      Tremaine Rodriguez  MD  Phone Number: [phone]    DATE: 10/19/18      TIME: 11:27 AM    DINESH RODRIGUEZ MD

## 2018-10-19 NOTE — DISCHARGE INSTRUCTIONS
Colonoscopy, Adult, Care After  This sheet gives you information about how to care for yourself after your procedure. Your doctor may also give you more specific instructions. If you have problems or questions, call your doctor.  Follow these instructions at home:  General instructions    · For the first 24 hours after the procedure:  ? Do not drive or use machinery.  ? Do not sign important documents.  ? Do not drink alcohol.  ? Do your daily activities more slowly than normal.  ? Eat foods that are soft and easy to digest.  ? Rest often.  · Take over-the-counter or prescription medicines only as told by your doctor.  · It is up to you to get the results of your procedure. Ask your doctor, or the department performing the procedure, when your results will be ready.  To help cramping and bloating:  · Try walking around.  · Put heat on your belly (abdomen) as told by your doctor. Use a heat source that your doctor recommends, such as a moist heat pack or a heating pad.  ? Put a towel between your skin and the heat source.  ? Leave the heat on for 20-30 minutes.  ? Remove the heat if your skin turns bright red. This is especially important if you cannot feel pain, heat, or cold. You can get burned.  Eating and drinking  · Drink enough fluid to keep your pee (urine) clear or pale yellow.  · Return to your normal diet as told by your doctor. Avoid heavy or fried foods that are hard to digest.  · Avoid drinking alcohol for as long as told by your doctor.  Contact a doctor if:  · You have blood in your poop (stool) 2-3 days after the procedure.  Get help right away if:  · You have more than a small amount of blood in your poop.  · You see large clumps of tissue (blood clots) in your poop.  · Your belly is swollen.  · You feel sick to your stomach (nauseous).  · You throw up (vomit).  · You have a fever.  · You have belly pain that gets worse, and medicine does not help your pain.  This information is not intended to  replace advice given to you by your health care provider. Make sure you discuss any questions you have with your health care provider.  Document Released: 01/20/2012 Document Revised: 09/11/2017 Document Reviewed: 09/11/2017  [employer] Interactive Patient Education © 2017 [employer]      Colon Mass, Adult  (Cecal Mass)   A colon mass is a growth in your colon. The colon is your large intestine.  What are the causes?  Many things can cause a colon mass, including:  · Cancer.  · Blood vessel problems.  · Infection.  · Inflammatory bowel disease (IBS).  · Diverticulitis. This is inflammation or infection of small pouches in your colon.  · Endometriosis. This is a condition in which the tissue that lines the uterus grows outside of its normal location.  · Volvulus. This is a condition in which the colon twists on the organ or tissue that supports the colon (mesentery).    What are the signs or symptoms?  Symptoms of a colon mass include:  · Cramping.  · Nausea.  · Diarrhea.  · Fever.  · Vomiting.  · Feeling weak.  · Pain in the abdomen, side, or back.  · Weight loss.  · Constipation.  · Bleeding from your rectum.  · Changes in bowel habits.  · Feeling the need for bowel a movement despite having had one recently.    In some cases, no symptoms are present.  How is this diagnosed?  To make a diagnosis, your health care provider will need to learn more about the mass. You may have tests or procedures done, such as:  · Blood tests.  · X-rays.  · An ultrasound.  · A CT scan.  · An MRI.  · A colonoscopy.  · A biopsy of the mass.    In some cases, what seemed like a colon mass may actually be something else, such as scarring that formed after a surgery or an ulcer.  How is this treated?  Treatment will depend on the cause of the mass. Your health care provider will discuss your test results with you, the meaning of the tests, and the recommended steps for starting treatment. In serious cases, emergency surgery may be  recommended immediately.  Follow these instructions at home:  What you need to do at home will depend on the cause of the mass. Follow the instructions that your health care provider gives to you. In general:  · Keep all follow-up visits as directed by your health care provider. This is important.  · Take medicines only as directed by your health care provider.    Contact a health care provider if:  · You develop new symptoms.  · You have a fever.  · Your pain does not get better with medicine.  · You feel weaker.  · You develop easy bruising or bleeding.  Get help right away if:  · You vomit bright red blood or material that looks like coffee grounds.  · You have blood in your stools, or your stools turn black and tarry.  · You faint.  · You feel that the mass has suddenly gotten larger.  · You develop severe bloating in your abdomen.  This information is not intended to replace advice given to you by your health care provider. Make sure you discuss any questions you have with your health care provider.  Document Released: 03/26/2008 Document Revised: 05/25/2017 Document Reviewed: 07/19/2015  [employer] Interactive Patient Education © 2018 [employer]

## 2018-10-22 ENCOUNTER — ANESTHESIA EVENT (OUTPATIENT)
Dept: GASTROENTEROLOGY | Facility: HOSPITAL | Age: 73
DRG: 331 | End: 2018-10-22
Payer: MEDICARE

## 2018-10-22 ASSESSMENT — ENCOUNTER SYMPTOMS: SHORTNESS OF BREATH: 1

## 2018-10-22 ASSESSMENT — ACTIVITIES OF DAILY LIVING (ADL): ADEQUATE_TO_COMPLETE_ADL: YES

## 2018-10-22 NOTE — ANESTHESIA PREPROCEDURE EVALUATION
"Pre-Procedure Assessment    Patient: Shama Caballero, female, 73 y.o.    Ht Readings from Last 1 Encounters:   10/19/18 1.575 m (5' 2\")     Wt Readings from Last 1 Encounters:   10/19/18 51.7 kg (114 lb)       Last Vitals  BP      Temp      Pulse     Resp      SpO2      Pain Score         Problem list reviewed and Medical history reviewed    History of anesthetic complications: PONV         Airway   Mallampati: II  TM distance: >3 FB  Neck ROM: full      Dental - normal exam     Pulmonary - normal exam   (+) shortness of breath,   Cardiovascular - normal exam  (+) hypertension,     ECG reviewed  ROS comment: 10/18/18 EKG  . Sinus rhythm  . Minimal ST depression, lateral leads    Mental Status/Neuro/Psych    Pt is alert.        GI/Hepatic/Renal      Endo/Other    (+) diabetes mellitus type 2 well controlled, history of cancer,   Abdominal           Social History     Tobacco Use   • Smoking status: Never Smoker   • Smokeless tobacco: Never Used   Substance Use Topics   • Alcohol use: Yes     Alcohol/week: 4.2 oz     Types: 7 Glasses of wine per week      Hematology   Lab Results   Component Value Date/Time    WBC 6.5 10/09/2018 07:20 AM    RBC 4.38 10/09/2018 07:20 AM    MCV 80.3 (L) 10/09/2018 07:20 AM    HGB 11.6 10/09/2018 07:20 AM    HCT 35.2 10/09/2018 07:20 AM     (H) 10/09/2018 07:20 AM      Coagulation No results found for: PT, APTT, INR   General Chemistry   Lab Results   Component Value Date/Time    POCGLU 101 10/19/2018 09:47 AM    CALCIUM 9.2 10/09/2018 07:20 AM    BUN 15 10/09/2018 07:20 AM    CREATININE 0.58 (L) 10/09/2018 07:20 AM    GLUCOSE 112 (H) 10/09/2018 07:20 AM     10/09/2018 07:20 AM    K 3.7 10/09/2018 07:20 AM    CO2 25 10/09/2018 07:20 AM     10/09/2018 07:20 AM     Anesthesia Plan    ASA 3   NPO status reviewed: > 8 hours    General         Induction: intravenous   Airway Planning: oral ET tube          Plan for postoperative opioid use.    Anesthetic plan and risks " discussed with patient and spouse.                 EMT/paramedic

## 2018-10-22 NOTE — PRE-PROCEDURE INSTRUCTIONS
Pre-Surgery Instructions:   Medication Instructions   • atorvastatin (LIPITOR) 20 mg tablet Do not take morning of surgery/procedure   • blood-glucose meter (ACCU-CHEK JOSE MANUEL) misc PRN morning of surgery/procedure   • cholecalciferol, vitamin D3, (cholecalciferol) 1,000 unit tablet Stop taking 3 days prior to surgery/procedure   • ferrous sulfate (SLOW FE) 142 mg (45 mg iron) tablet extended release Stop taking 3 days prior to surgery/procedure   • lancets (ACCU-CHEK FASTCLIX) misc PRN morning of surgery/procedure   • lancing device with lancets (ACCU-CHEK FASTCLIX) kit PRN morning of surgery/procedure   • losartan (COZAAR) 50 mg tablet Take morning of surgery/procedure   • metFORMIN (GLUCOPHAGE) 500 mg tablet Do not take morning of surgery/procedure   • metroNIDAZOLE (FLAGYL) 500 mg tablet Do not take morning of surgery/procedure   • neomycin (MYCIFRADIN) 500 mg tablet Do not take morning of surgery/procedure   PRE-OP DONE.  INSTRUCTED SURGERY DEPT. WILL CALL 1-2 DAYS PRIOR TO DOS WITH ARRIVAL TIME AND NPO INSTRUCTIONS.  REVIEWED AND DISCUSSED MEDS.  ENCOURAGED TO HYDRATE WELL FOR THE 2 DAYS PRIOR TO DOS.  DISCUSSED SHOWERING AND NO SKIN PRODUCTS DOS.   WILL BE WITH DOS.  VERBALIZES UNDERSTANDING OF INSTRUCTIONS.

## 2018-10-25 ENCOUNTER — ANESTHESIA (OUTPATIENT)
Dept: GASTROENTEROLOGY | Facility: HOSPITAL | Age: 73
DRG: 331 | End: 2018-10-25
Payer: MEDICARE

## 2018-10-25 ENCOUNTER — HOSPITAL ENCOUNTER (INPATIENT)
Facility: HOSPITAL | Age: 73
LOS: 2 days | Discharge: HOME | DRG: 331 | End: 2018-10-27
Attending: SURGERY | Admitting: SURGERY
Payer: MEDICARE

## 2018-10-25 DIAGNOSIS — C18.2 MALIGNANT NEOPLASM OF ASCENDING COLON (CMS/HCC): Primary | ICD-10-CM

## 2018-10-25 PROBLEM — C18.9 COLON CANCER (CMS/HCC): Status: ACTIVE | Noted: 2018-10-25

## 2018-10-25 LAB
GLUCOSE BLDC GLUCOMTR-MCNC: 126 MG/DL (ref 70–105)
GLUCOSE BLDC GLUCOMTR-MCNC: 157 MG/DL (ref 70–105)
GLUCOSE BLDC GLUCOMTR-MCNC: 174 MG/DL (ref 70–105)

## 2018-10-25 PROCEDURE — 82962 GLUCOSE BLOOD TEST: CPT

## 2018-10-25 PROCEDURE — 6360000200 HC RX 636 W HCPCS (ALT 250 FOR IP): Performed by: SURGERY

## 2018-10-25 PROCEDURE — 94799 UNLISTED PULMONARY SVC/PX: CPT

## 2018-10-25 PROCEDURE — (BLANK) HC RECOVERY PHASE-1 1ST  HOUR ACUITY LEVEL 3: Performed by: SURGERY

## 2018-10-25 PROCEDURE — 99100 ANES PT EXTEME AGE<1 YR&>70: CPT | Performed by: NURSE ANESTHETIST, CERTIFIED REGISTERED

## 2018-10-25 PROCEDURE — 0DTF4ZZ RESECTION OF RIGHT LARGE INTESTINE, PERCUTANEOUS ENDOSCOPIC APPROACH: ICD-10-PCS | Performed by: SURGERY

## 2018-10-25 PROCEDURE — 2580000300 HC RX 258: Performed by: SURGERY

## 2018-10-25 PROCEDURE — (BLANK) HC OR LEVEL 3 PROC 1ST 15MIN: Performed by: SURGERY

## 2018-10-25 PROCEDURE — 00840 ANES IPER PX LOWER ABD NOS: CPT | Performed by: NURSE ANESTHETIST, CERTIFIED REGISTERED

## 2018-10-25 PROCEDURE — 6370000100 HC RX 637 (ALT 250 FOR IP): Performed by: SURGERY

## 2018-10-25 PROCEDURE — 44204 LAPARO PARTIAL COLECTOMY: CPT | Performed by: SURGERY

## 2018-10-25 PROCEDURE — 88309 TISSUE EXAM BY PATHOLOGIST: CPT

## 2018-10-25 PROCEDURE — S0028 INJECTION, FAMOTIDINE, 20 MG: HCPCS | Performed by: NURSE ANESTHETIST, CERTIFIED REGISTERED

## 2018-10-25 PROCEDURE — 2580000300 HC RX 258: Performed by: NURSE ANESTHETIST, CERTIFIED REGISTERED

## 2018-10-25 PROCEDURE — 6360000200 HC RX 636 W HCPCS (ALT 250 FOR IP): Mod: JW | Performed by: NURSE ANESTHETIST, CERTIFIED REGISTERED

## 2018-10-25 PROCEDURE — C1729 CATH, DRAINAGE: HCPCS | Performed by: SURGERY

## 2018-10-25 PROCEDURE — (BLANK) HC OR LEVEL 3 PROC EACH ADDITIONAL MIN: Performed by: SURGERY

## 2018-10-25 PROCEDURE — (BLANK) HC ROOM PRIVATE

## 2018-10-25 PROCEDURE — 6360000200 HC RX 636 W HCPCS (ALT 250 FOR IP): Performed by: NURSE ANESTHETIST, CERTIFIED REGISTERED

## 2018-10-25 PROCEDURE — 2500000200 HC RX 250 WO HCPCS: Performed by: NURSE ANESTHETIST, CERTIFIED REGISTERED

## 2018-10-25 RX ORDER — ONDANSETRON HYDROCHLORIDE 2 MG/ML
INJECTION, SOLUTION INTRAVENOUS AS NEEDED
Status: DISCONTINUED | OUTPATIENT
Start: 2018-10-25 | End: 2018-10-25 | Stop reason: SURG

## 2018-10-25 RX ORDER — IPRATROPIUM BROMIDE AND ALBUTEROL SULFATE 2.5; .5 MG/3ML; MG/3ML
3 SOLUTION RESPIRATORY (INHALATION) ONCE AS NEEDED
Status: DISCONTINUED | OUTPATIENT
Start: 2018-10-25 | End: 2018-10-25 | Stop reason: HOSPADM

## 2018-10-25 RX ORDER — INSULIN ASPART 100 [IU]/ML
1-15 INJECTION, SOLUTION INTRAVENOUS; SUBCUTANEOUS
Status: DISCONTINUED | OUTPATIENT
Start: 2018-10-25 | End: 2018-10-27 | Stop reason: HOSPADM

## 2018-10-25 RX ORDER — SODIUM CHLORIDE, SODIUM LACTATE, POTASSIUM CHLORIDE, CALCIUM CHLORIDE 600; 310; 30; 20 MG/100ML; MG/100ML; MG/100ML; MG/100ML
125 INJECTION, SOLUTION INTRAVENOUS CONTINUOUS
Status: DISCONTINUED | OUTPATIENT
Start: 2018-10-25 | End: 2018-10-26

## 2018-10-25 RX ORDER — SODIUM CHLORIDE, SODIUM LACTATE, POTASSIUM CHLORIDE, CALCIUM CHLORIDE 600; 310; 30; 20 MG/100ML; MG/100ML; MG/100ML; MG/100ML
INJECTION, SOLUTION INTRAVENOUS CONTINUOUS PRN
Status: DISCONTINUED | OUTPATIENT
Start: 2018-10-25 | End: 2018-10-25 | Stop reason: SURG

## 2018-10-25 RX ORDER — NALOXONE HYDROCHLORIDE 0.4 MG/ML
0.2 INJECTION, SOLUTION INTRAMUSCULAR; INTRAVENOUS; SUBCUTANEOUS AS NEEDED
Status: DISCONTINUED | OUTPATIENT
Start: 2018-10-25 | End: 2018-10-25 | Stop reason: HOSPADM

## 2018-10-25 RX ORDER — OXYCODONE HYDROCHLORIDE 5 MG/1
5-10 TABLET ORAL EVERY 4 HOURS PRN
Status: DISCONTINUED | OUTPATIENT
Start: 2018-10-25 | End: 2018-10-27 | Stop reason: HOSPADM

## 2018-10-25 RX ORDER — HYDROMORPHONE HYDROCHLORIDE 1 MG/ML
.4-.6 INJECTION, SOLUTION INTRAMUSCULAR; INTRAVENOUS; SUBCUTANEOUS
Status: DISCONTINUED | OUTPATIENT
Start: 2018-10-25 | End: 2018-10-27 | Stop reason: HOSPADM

## 2018-10-25 RX ORDER — LIDOCAINE HYDROCHLORIDE 20 MG/ML
INJECTION, SOLUTION EPIDURAL; INFILTRATION; INTRACAUDAL; PERINEURAL AS NEEDED
Status: DISCONTINUED | OUTPATIENT
Start: 2018-10-25 | End: 2018-10-25 | Stop reason: SURG

## 2018-10-25 RX ORDER — DEXAMETHASONE SODIUM PHOSPHATE 4 MG/ML
4 INJECTION, SOLUTION INTRA-ARTICULAR; INTRALESIONAL; INTRAMUSCULAR; INTRAVENOUS; SOFT TISSUE ONCE AS NEEDED
Status: DISCONTINUED | OUTPATIENT
Start: 2018-10-25 | End: 2018-10-25 | Stop reason: HOSPADM

## 2018-10-25 RX ORDER — ROCURONIUM BROMIDE 50 MG/5 ML
SYRINGE (ML) INTRAVENOUS AS NEEDED
Status: DISCONTINUED | OUTPATIENT
Start: 2018-10-25 | End: 2018-10-25 | Stop reason: SURG

## 2018-10-25 RX ORDER — DEXAMETHASONE SODIUM PHOSPHATE 4 MG/ML
INJECTION, SOLUTION INTRA-ARTICULAR; INTRALESIONAL; INTRAMUSCULAR; INTRAVENOUS; SOFT TISSUE AS NEEDED
Status: DISCONTINUED | OUTPATIENT
Start: 2018-10-25 | End: 2018-10-25 | Stop reason: SURG

## 2018-10-25 RX ORDER — ONDANSETRON HYDROCHLORIDE 2 MG/ML
4 INJECTION, SOLUTION INTRAVENOUS EVERY 6 HOURS PRN
Status: DISCONTINUED | OUTPATIENT
Start: 2018-10-25 | End: 2018-10-27 | Stop reason: HOSPADM

## 2018-10-25 RX ORDER — FENTANYL CITRATE/PF 50 MCG/ML
50 PLASTIC BAG, INJECTION (ML) INTRAVENOUS EVERY 5 MIN PRN
Status: DISCONTINUED | OUTPATIENT
Start: 2018-10-25 | End: 2018-10-25 | Stop reason: HOSPADM

## 2018-10-25 RX ORDER — LABETALOL HYDROCHLORIDE 5 MG/ML
5 INJECTION, SOLUTION INTRAVENOUS EVERY 5 MIN PRN
Status: DISCONTINUED | OUTPATIENT
Start: 2018-10-25 | End: 2018-10-25 | Stop reason: HOSPADM

## 2018-10-25 RX ORDER — FENTANYL CITRATE/PF 50 MCG/ML
PLASTIC BAG, INJECTION (ML) INTRAVENOUS AS NEEDED
Status: DISCONTINUED | OUTPATIENT
Start: 2018-10-25 | End: 2018-10-25 | Stop reason: SURG

## 2018-10-25 RX ORDER — HYDROMORPHONE HYDROCHLORIDE 2 MG/ML
0.5 INJECTION, SOLUTION INTRAMUSCULAR; INTRAVENOUS; SUBCUTANEOUS EVERY 5 MIN PRN
Status: DISCONTINUED | OUTPATIENT
Start: 2018-10-25 | End: 2018-10-25 | Stop reason: HOSPADM

## 2018-10-25 RX ORDER — SODIUM CHLORIDE, SODIUM LACTATE, POTASSIUM CHLORIDE, AND CALCIUM CHLORIDE .6; .31; .03; .02 G/100ML; G/100ML; G/100ML; G/100ML
500 INJECTION, SOLUTION INTRAVENOUS ONCE AS NEEDED
Status: DISCONTINUED | OUTPATIENT
Start: 2018-10-25 | End: 2018-10-25 | Stop reason: HOSPADM

## 2018-10-25 RX ORDER — BUPIVACAINE HYDROCHLORIDE 2.5 MG/ML
INJECTION, SOLUTION EPIDURAL; INFILTRATION; INTRACAUDAL AS NEEDED
Status: DISCONTINUED | OUTPATIENT
Start: 2018-10-25 | End: 2018-10-25 | Stop reason: HOSPADM

## 2018-10-25 RX ORDER — ONDANSETRON HYDROCHLORIDE 2 MG/ML
4 INJECTION, SOLUTION INTRAVENOUS ONCE AS NEEDED
Status: DISCONTINUED | OUTPATIENT
Start: 2018-10-25 | End: 2018-10-25 | Stop reason: HOSPADM

## 2018-10-25 RX ORDER — PROPOFOL 10 MG/ML
INJECTION, EMULSION INTRAVENOUS AS NEEDED
Status: DISCONTINUED | OUTPATIENT
Start: 2018-10-25 | End: 2018-10-25 | Stop reason: SURG

## 2018-10-25 RX ORDER — ACETAMINOPHEN 325 MG/1
325-650 TABLET ORAL EVERY 4 HOURS PRN
Status: DISCONTINUED | OUTPATIENT
Start: 2018-10-25 | End: 2018-10-27 | Stop reason: HOSPADM

## 2018-10-25 RX ORDER — SUCCINYLCHOLINE CHLORIDE 20 MG/ML INJECTION SOLUTION
SOLUTION AS NEEDED
Status: DISCONTINUED | OUTPATIENT
Start: 2018-10-25 | End: 2018-10-25 | Stop reason: SURG

## 2018-10-25 RX ORDER — FAMOTIDINE 10 MG/ML
INJECTION INTRAVENOUS AS NEEDED
Status: DISCONTINUED | OUTPATIENT
Start: 2018-10-25 | End: 2018-10-25 | Stop reason: SURG

## 2018-10-25 RX ORDER — HYDROMORPHONE HYDROCHLORIDE 2 MG/ML
INJECTION, SOLUTION INTRAMUSCULAR; INTRAVENOUS; SUBCUTANEOUS AS NEEDED
Status: DISCONTINUED | OUTPATIENT
Start: 2018-10-25 | End: 2018-10-25 | Stop reason: SURG

## 2018-10-25 RX ORDER — LOSARTAN POTASSIUM 50 MG/1
50 TABLET ORAL DAILY
Status: DISCONTINUED | OUTPATIENT
Start: 2018-10-25 | End: 2018-10-27 | Stop reason: HOSPADM

## 2018-10-25 RX ORDER — HYDROMORPHONE HYDROCHLORIDE 1 MG/ML
.2-.3 INJECTION, SOLUTION INTRAMUSCULAR; INTRAVENOUS; SUBCUTANEOUS
Status: DISCONTINUED | OUTPATIENT
Start: 2018-10-25 | End: 2018-10-27 | Stop reason: HOSPADM

## 2018-10-25 RX ADMIN — SODIUM CHLORIDE, POTASSIUM CHLORIDE, SODIUM LACTATE AND CALCIUM CHLORIDE 125 ML/HR: 600; 310; 30; 20 INJECTION, SOLUTION INTRAVENOUS at 17:25

## 2018-10-25 RX ADMIN — Medication 40 MG: at 07:21

## 2018-10-25 RX ADMIN — SUGAMMADEX 200 MG: 100 INJECTION, SOLUTION INTRAVENOUS at 08:26

## 2018-10-25 RX ADMIN — FENTANYL CITRATE 50 MCG: 50 INJECTION, SOLUTION INTRAMUSCULAR; INTRAVENOUS at 07:55

## 2018-10-25 RX ADMIN — PROPOFOL 120 MG: 10 INJECTION, EMULSION INTRAVENOUS at 07:08

## 2018-10-25 RX ADMIN — OXYCODONE HYDROCHLORIDE 5 MG: 5 TABLET ORAL at 12:48

## 2018-10-25 RX ADMIN — HYDROMORPHONE HYDROCHLORIDE 0.2 MG: 2 INJECTION, SOLUTION INTRAMUSCULAR; INTRAVENOUS; SUBCUTANEOUS at 08:23

## 2018-10-25 RX ADMIN — CEFOXITIN SODIUM 1000 MG: 1 POWDER, FOR SOLUTION INTRAVENOUS at 21:13

## 2018-10-25 RX ADMIN — HYDROMORPHONE HYDROCHLORIDE 0.2 MG: 2 INJECTION, SOLUTION INTRAMUSCULAR; INTRAVENOUS; SUBCUTANEOUS at 08:36

## 2018-10-25 RX ADMIN — FAMOTIDINE 20 MG: 10 INJECTION INTRAVENOUS at 07:16

## 2018-10-25 RX ADMIN — FENTANYL CITRATE 50 MCG: 50 INJECTION, SOLUTION INTRAMUSCULAR; INTRAVENOUS at 07:03

## 2018-10-25 RX ADMIN — INSULIN ASPART 1 UNITS: 100 INJECTION, SOLUTION INTRAVENOUS; SUBCUTANEOUS at 16:17

## 2018-10-25 RX ADMIN — SODIUM CHLORIDE, POTASSIUM CHLORIDE, SODIUM LACTATE AND CALCIUM CHLORIDE: 600; 310; 30; 20 INJECTION, SOLUTION INTRAVENOUS at 07:55

## 2018-10-25 RX ADMIN — LIDOCAINE HYDROCHLORIDE 60 MG: 20 INJECTION, SOLUTION EPIDURAL; INFILTRATION; INTRACAUDAL; PERINEURAL at 07:07

## 2018-10-25 RX ADMIN — CEFOXITIN 2000 MG: 2 INJECTION, POWDER, FOR SOLUTION INTRAVENOUS at 07:16

## 2018-10-25 RX ADMIN — SODIUM CHLORIDE, POTASSIUM CHLORIDE, SODIUM LACTATE AND CALCIUM CHLORIDE: 600; 310; 30; 20 INJECTION, SOLUTION INTRAVENOUS at 07:02

## 2018-10-25 RX ADMIN — EPHEDRINE SULFATE 10 MG: 50 INJECTION, SOLUTION INTRAVENOUS at 07:30

## 2018-10-25 RX ADMIN — OXYCODONE HYDROCHLORIDE 10 MG: 5 TABLET ORAL at 23:09

## 2018-10-25 RX ADMIN — CEFOXITIN SODIUM 1000 MG: 1 POWDER, FOR SOLUTION INTRAVENOUS at 08:55

## 2018-10-25 RX ADMIN — FENTANYL CITRATE 50 MCG: 50 INJECTION, SOLUTION INTRAMUSCULAR; INTRAVENOUS at 09:30

## 2018-10-25 RX ADMIN — Medication 120 MG: at 07:07

## 2018-10-25 RX ADMIN — DEXAMETHASONE SODIUM PHOSPHATE 10 MG: 4 INJECTION, SOLUTION INTRAMUSCULAR; INTRAVENOUS at 07:29

## 2018-10-25 RX ADMIN — Medication 10 MG: at 07:07

## 2018-10-25 RX ADMIN — ONDANSETRON 4 MG: 2 INJECTION INTRAMUSCULAR; INTRAVENOUS at 07:29

## 2018-10-25 RX ADMIN — HYDROMORPHONE HYDROCHLORIDE 0.2 MG: 2 INJECTION, SOLUTION INTRAMUSCULAR; INTRAVENOUS; SUBCUTANEOUS at 08:40

## 2018-10-25 RX ADMIN — CEFOXITIN SODIUM 1000 MG: 1 POWDER, FOR SOLUTION INTRAVENOUS at 15:22

## 2018-10-25 RX ADMIN — SODIUM CHLORIDE, POTASSIUM CHLORIDE, SODIUM LACTATE AND CALCIUM CHLORIDE 125 ML/HR: 600; 310; 30; 20 INJECTION, SOLUTION INTRAVENOUS at 10:08

## 2018-10-25 RX ADMIN — HYDROMORPHONE HYDROCHLORIDE 0.2 MG: 2 INJECTION, SOLUTION INTRAMUSCULAR; INTRAVENOUS; SUBCUTANEOUS at 08:11

## 2018-10-25 ASSESSMENT — ACTIVITIES OF DAILY LIVING (ADL)
PATIENT'S MEMORY ADEQUATE TO SAFELY COMPLETE DAILY ACTIVITIES?: YES
ADEQUATE_TO_COMPLETE_ADL: YES

## 2018-10-25 NOTE — PLAN OF CARE
Problem: Knowledge Deficit  Goal: Patient/family/caregiver demonstrates understanding of disease process, treatment plan, medications, and discharge instructions  INTERVENTIONS:   1. Complete learning assessment and assess knowledge base  2. Provide teaching at level of understanding   3. Provide teaching via preferred learning methods  Outcome: Progressing   10/25/18 1023   Interventions Appropriate for this Patient   Patient/family/caregiver demonstrates understanding of disease process, treatment plan, medications, and discharge instructions Complete learning assessment and assess knowledge base;Provide teaching at level of understanding;Provide teaching via preferred learning methods       Problem: Potential for Compromised Skin Integrity  Goal: Skin Integrity is Maintained or Improved  INTERVENTIONS:  1. Assess and monitor skin integrity  2. Collaborate with interdisciplinary team and initiate plans and interventions as needed  3. Alternate a full bath with partial baths for elderly   4. Monitor patient's hygiene practices   5. Collaborate with wound, ostomy, and continence nurse  Outcome: Progressing   10/25/18 1023   Interventions Appropriate for this Patient   Skin integrity is maintained or improved Assess and monitor skin integrity;Collaborate with interdisciplinary team and initiate plans and interventions as needed;Monitor patient's hygiene practices     Goal: Nutritional status is improving  INTERVENTIONS:  1. Monitor and assess patient for malnutrition (ex- brittle hair, bruises, dry skin, pale skin and conjunctiva, muscle wasting, smooth red tongue, and disorientation)  2. Monitor patient's weight and dietary intake as ordered or per policy  3. Determine patient's food preferences and provide high-protein, high-caloric foods as appropriate  4. Assist patient with eating   5. Allow adequate time for meals   6. Encourage patient to take dietary supplement as ordered   7. Collaborate with dietitian  8.  Include patient/family/caregiver in decisions related to nutrition  Outcome: Progressing   10/25/18 1023   Interventions Appropriate for this Patient   Nutritional status is improving Monitor and assess patient for malnutrition (ex- brittle hair, bruises, dry skin, pale skin and conjunctiva, muscle wasting, smooth red tongue, and disorientation);Monitor patient's weight and dietary intake as ordered or per policy;Determine patient's food preferences and provide high-protein, high-caloric foods as appropriate;Allow adequate time for meals     Goal: MOBILITY IS MAINTAINED OR IMPROVED  INTERVENTIONS  1. Collaborate with interdisciplinary team and initiate plan and interventions as ordered (PT/OT)  2. Encourage ambulation  3. Up to chair for meals  4. Monitor for signs of deconditioning  Outcome: Progressing   10/25/18 1023   Interventions Appropriate for this Patient   Mobility is Maintained or Improved Collaborate with interdisciplinary team and initiate plan and interventions as ordered (PT/OT);Encourage ambulation;Up to chair for meals;Monitor for signs of deconditioning       Problem: Urinary Incontinence  Goal: Perineal skin integrity is maintained or improved  INTERVENTIONS:  1. Assess genitourinary system, perineal skin, labs (urinalysis), and history of incontinence to include past management, aggravating, and alleviating factors  2. Collaborate with interdisciplinary team including wound, ostomy, and continence nurse and initiate plans and interventions as needed  4. Consider urine containment device  5. Apply skin protectant   6. Develop skin care regimen  7. Provide privacy when changing patient's incontinence device to maintain their dignity  Outcome: Progressing   10/25/18 1023   Interventions Appropriate for this Patient   Perineal skin integrity is maintained or improved Assess genitourinary system, perineal skin, labs (urinalysis), and history of incontinence to include past management, aggravating, and  alleviating factors;Collaborate with interdisciplinary team including wound, ostomy, and continence nurse and initiate plans and interventions as needed;Consider urine containment device;Apply skin protectant;Develop skin care regimen;Provide privacy when changing patient's incontinence device to maintain their dignity       Problem: Respiratory - Adult  Goal: Achieves optimal ventilation and oxygenation  INTERVENTIONS:  1. Assess for changes in respiratory status  2. Assess for changes in mentation and behavior  3. Position to facilitate oxygenation and minimize respiratory effort  4. Oxygen supplementation based on oxygen saturation or ABGs  5. Assess patient's ability to cough effectively  6. Encourage broncho-pulmonary hygiene including cough, deep breathe  7. Assess the need for suctioning   8. Assess and instruct to report SOB or any respiratory difficulty  9. Respiratory Therapy support as indicated, including medications and treatment.  Outcome: Progressing   10/25/18 1023   Interventions Appropriate for this Patient   Achieves optimal ventilation and oxygenation Assess for changes in respiratory status;Assess for changes in mentation and behavior;Position to facilitate oxygenation and minimize respiratory effort;Oxygen supplementation based on oxygen saturation or arterial blood gases;Assess patient's ability to cough effectively;Encourage broncho-pulmonary hygiene including cough, deep breathe;Assess the need for suctioning;Assess and instruct to report shortness of breath or any respiratory difficulty       Problem: Gastrointestinal - Adult  Goal: Minimal or absence of nausea and vomiting  INTERVENTIONS:  1. Ensure adequate hydration  2. Monitor intake and output  3. Maintain NPO status until nausea and vomiting are resolved  4. Nasogastric tube to low intermittent suction as ordered  5. Administer ordered antiemetic medications as needed  6. Provide nonpharmacologic comfort measures as appropriate  7.  Advance diet as ordered  8. Nutrition consult as indicated   Outcome: Progressing   10/25/18 1023   Interventions Appropriate for this Patient   Minimal or absence of nausea and vomiting Ensure adequate hydration;Monitor intake and output;Provide nonpharmacologic comfort measures as appropriate;Nutrition consult as indicated     Goal: Maintains or returns to baseline digestive function  INTERVENTIONS:  1. Assess bowel function  2. Ensure adequate hydration  3. Administer ordered medications as needed  4. Encourage mobilization and activity  5. Nutrition consult as indicated  6. Assess hydration and nutritional status  7. Assess characteristics and frequency of stool  8. Monitor for metabolic panel imbalances  9. Assess for treatment effectiveness  Outcome: Progressing   10/25/18 1023   Interventions Appropriate for this Patient   Maintains or returns to baseline bowel function Assess bowel function;Ensure adequate hydration;Administer ordered medications as needed;Encourage mobilization and activity;Nutrition consult as indicated;Assess hydration and nutritional status;Assess characteristics and frequency of stool;Monitor for metabolic panel imbalances;Assess for treatment effectiveness     Goal: Maintains adequate nutritional intake  INTERVENTIONS:  1. Monitor percentage of each meal consumed  2. Identify factors contributing to decreased intake, treat as appropriate  3. Assist with meals as needed  4. Monitor I&O, weight and lab values  5. Obtain nutritional consult as indicated  6. Administer alternative nutrition interventions as ordered  Outcome: Progressing   10/25/18 1023   Interventions Appropriate for this Patient   Maintains adequate nutritional intake Monitor percentage of each meal consumed;Identify factors contributing to decreased intake, treat as appropriate;Assist with meals as needed;Monitor I&O, weight and lab values     Goal: Establish and maintain optimal ostomy function  INTERVENTIONS:  1.  Assess bowel function  2. Encourage mobilization and activity  3. Assess ostomy stoma characteristics  4. Assess skin surrounding ostomy stoma  5. Empty/Change ostomy pouch as needed  6. Provide ostomy care  7. Consult Wound Care/Ostomy Nurses as indicated  8. Nutrition consult as indicated  Outcome: Progressing   10/25/18 1023   Interventions Appropriate for this Patient   Establish and maintain optimal ostomy function Assess bowel function;Encourage mobilization and activity     Goal: Maintains Optimal Tissue Perfusion  INTERVENTIONS:  1. Monitor for increased abdominal girth, firmness or intra-abdominal pressure  2. Monitor nutritional intake, intake/output, and weight  3. Assess for signs of dehydration  4. Assess blood pressure, heart rate, and oxygen saturation  5. Assess skin color, capillary refill and edema  6. Monitor metabolic panels, blood count panels, and coagulations panels as ordered  7. Assess bowel function  8. Assess for blood in emesis and stool  Outcome: Progressing   10/25/18 1023   Interventions Appropriate for this Patient   Maintains Optimal Tissue Perfusion Monitor for increased abdominal girth, firmness or intra-abdominal pressure;Monitor nutritional intake, intake/output, and weight;Assess for signs of dehydration;Assess blood pressure, heart rate, and oxygen saturation;Assess skin color, capillary refill and edema;Monitor metabolic panels, blood count panels, and coagulations panels as ordered;Assess bowel function;Assess for blood in emesis and stool     Goal: Nutrient intake appropriate for improving, restoring or maintaining nutritional needs  INTERVENTIONS:  1. Assess nutritional status  2. Monitor oral intake, labs, and treatment plans  3. Provide appropriate diets, oral nutritional supplements, and vitamin/mineral supplements  4. Monitor, and adjust tube feedings and TPN/PPN based on assessed needs  5. Provide specific nutrition education as appropriate  Outcome: Progressing    10/25/18 1023   Interventions Appropriate for this Patient   Nutrient intake appropriate for improving, restoring or maintaining nutritional needs Assess nutritional status;Monitor oral intake, labs, and treatment plans;Provide appropriate diets, oral nutritional supplements, and vitamin/mineral supplements;Monitor, and adjust tube feedings and TPN/PPN based on assessed needs       Problem: Pain - Adult  Goal: Verbalizes/displays adequate comfort level or baseline comfort level  INTERVENTIONS:  1. Encourage patient to monitor pain and request interventions  2. Assess pain using the appropriate pain scale  3. Administer analgesics based on type and severity of pain and evaluate response  4. Educate/Implement non-pharmacological measures as appropriate and evaluate response  5. Consider cultural, developmental and social influences on pain and pain management  6. Notify Provider if interventions unsuccessful or patient reports new pain  Outcome: Progressing   10/25/18 1023   Interventions Appropriate for this Patient   Verbalizes/displays adequate comfort level or baseline comfort level Encourage patient to monitor pain and request interventions;Assess pain using the appropriate pain scale;Administer analgesics based on type and severity of pain and evaluate response;Educate/Implement non-pharmacological measures as appropriate and evaluate response       Problem: Safety Adult  Goal: Patient will remain safe during hospitalization  INTERVENTIONS    1. Assess patient for fall risk and implement interventions if needed  2. Use safe transport techniques  3. Assess patient using the Jon skin assessment scale  4. Assess patient for risk of aspiration  5. Assess patient for risk of elopement  Outcome: Progressing   10/25/18 1023   Interventions Appropriate for this Patient   Patient will remain safe durning hospitalization Assess patient for Fall Risk;Use safe transport

## 2018-10-25 NOTE — ANESTHESIA PROCEDURE NOTES
ANESTHESIA INTUBATION  Date/Time: 10/25/2018 7:08 AM  Urgency: elective    Airway not difficult    General Information and Staff    Patient location during procedure: OR  CRNA: Valerie Cleary CRNA  Performed: CRNA     Indications and Patient Condition  Indications for airway management: anesthesia and airway protection  Spontaneous Ventilation: absent  Sedation level: deep  Preoxygenated: yes  Patient position: sniffing  MILS maintained throughout  Mask difficulty assessment: 0 - not attempted    Final Airway Details  Final airway type: endotracheal airway      Successful airway: ETT  Cuffed: yes   Successful intubation technique: direct laryngoscopy and rapid sequence induction  Facilitating devices/methods: cricoid pressure  Endotracheal tube insertion site: oral  Blade: Elizabeth  Blade size: #3  ETT size (mm): 6.5  Cormack-Lehane Classification: grade IIa - partial view of glottis  Placement verified by: chest auscultation, capnometry and palpation of cuff   Measured from: lips  ETT to lips (cm): 20  Number of attempts at approach: 1

## 2018-10-25 NOTE — OP NOTE
Operative Note    Shama Caballero  10/25/18    PREOPERATIVE DIAGNOSIS:  Pre-op Diagnosis     * Malignant neoplasm of ascending colon (CMS/HCC) (HCC) [C18.2]    POSTOPERATIVE DIAGNOSIS:  SAME    PROCEDURES:    Procedures:    * LAPAROSCOPIC RIGHT HEMICOLECTOMY    SURGEON: Surgeon(s):  Tremaine Rodriguez MD    ANESTHESIA TYPE:  General      15 mL    SPECIMENS:   Order Name Source Comment Collection Info Order Time   PATHOLOGY SPECIMEN (HISTOLOGY) Other  Collected By: Tremaine Rodriguez MD 10/25/2018  8:14 AM       COMPLICATIONS:  None    INDICATIONS: Right colon cancer    FINDINGS: Adhesion of mass to the lateral sidewall, liver surfaces and peritoneal surfaces are clear of any tumor implants    DESCRIPTION OF PROCEDURE: After informed consent was obtained patient was brought to the operating room suite and placed supine upon the operating table.  The smooth induction of general tracheal anesthesia the patient was prepped and draped normal sterile surgical fashion.    Preoperative pause is undertaken from patient, site, procedure, antibiotics, allergies    Local anesthetic was infiltrated in the supraumbilical skin.  2 cm incision made carried down to fascia fascia was opened sharply in the vertical midline.  Karimi trocar was placed and pneumoperitoneum instilled.  There were some adhesions around the Karimi trocar site.  Laparoscope was inserted and 5 mm port then placed supraumbilically and one in the left lower quadrant.  LigaSure device was used to take down the omental adhesions around the Karimi trocar site.  The right lower quadrant showed adhesions of the mass to the lateral sidewall these were taken down including some of the peritoneal sidewall tissue back to soft normal-appearing tissue with LigaSure device.  White line of Toldt was then entered and carried up to the hepatic flexure.  Hepatic flexure was taken down with LigaSure device.  Duodenum was visualized and protected throughout this entire process.   Dissection was carried out around to the transverse colon.  Some omental adhesions up to the liver bed were then taken down with LigaSure device freeing up the entire hepatic flexure.  Dissection was then carried inferior.  The peritoneum was opened up down onto the terminal ileum and mobilized to the midline with blunt dissection.  Nita clamp was then placed on the tumor.  Karimi trocar was removed and the Karimi trocar site widened to 6 cm.  Willian wound protector was placed.  Tumor was brought up into the wound.  Proximal resection margin on the terminal ileum was picked.  Window was created in the mesentery.  A ERIN 80 mm blue load stapler was fired across this.  Mesentery was then taken down to the right colic.  Patient had generous lymph nodes palpable.  High ligation of the right colic was undertaken with an Endo ERIN 60 mm vascular load stapler.  The remainder the mesentery was then taken down with LigaSure to the distal resection margin on the transverse colon.  This was then transected with a ERIN 80 mm blue load stapler.  Specimen was passed off the field.  Terminal ileum was brought up to the transverse colon.  Lembert stitches of 2-0 silk were then placed on the back wall.  Enterotomies were made on both intestinal walls and a ERIN 80 mm blue load stapler fired down this.  This created a functional end-to-end, side-to-side orientation and anastomosis.  The enterotomy site was then closed with a TA 60 mm stapler.  The corners of the staple edges were then Lemberted closed with 2-0 silk suture.  A crotch stitch of 2-0 silk was placed as well.  The mesenteric defect was closed with interrupted 2-0 Vicryl suture.  The anastomosis was widely patent at the completion of this.  It was dropped back within the abdomen.  Pneumoperitoneum was instilled and abdomen was reexplored.  There was no bleeding.  A tap block was then placed with quarter percent Marcaine at the colon extraction site.  Pneumoperitoneum was  released and all trochars were removed.  Fascia was closed at the Karimi trocar site with running 0 PDS suture.  4-0 subcuticular Vicryl was used to close skin.  Sterile dressings were applied.  Patient tolerated procedure well without apparent complications      Tremaine Rodriguez MD  Phone Number: [phone]    DATE: 10/25/18      TIME: 8:50 AM    TREMAINE RODRIGUEZ MD

## 2018-10-25 NOTE — PERIOPERATIVE NURSING NOTE
Patient VSS throughout recovery. Oxygen saturation stayed greater than 90% with and without oxygen. Patient denied nausea. Rated pain as a 4 for most of recovery but patient stated she did not want any pain medication at this time. Patient stated the pain was tolerable. Towards the end of recovery patient stated she would like some pain medication. One dose was given and patient stated it had helped and she could not feel the pain after the medication was given. Patient rested quietly throughout recovery.

## 2018-10-25 NOTE — ANESTHESIA POSTPROCEDURE EVALUATION
Patient: Shama Caballero    Procedure Summary     Date:  10/25/18 Room / Location:  [facility] OR 04 / [facility] OR    Anesthesia Start:  0702 Anesthesia Stop:  0856    Procedure:  LAPAROSCOPIC RIGHT HEMICOLECTOMY (Right Abdomen) Diagnosis:       Malignant neoplasm of ascending colon (CMS/HCC) (HCC)      (Malignant neoplasm of ascending colon (CMS/HCC) (HCC) [C18.2])    Surgeon:  Tremaine Rodriguez MD Responsible Provider:  Valerie Cleary CRNA    Anesthesia Type:  general ASA Status:  3          Anesthesia Type: general  Last vitals  BP      Temp      Pulse     Resp      SpO2      Pain Score        Anesthesia Post Evaluation    Patient location during evaluation: PACU  Patient participation: complete - patient participated  Level of consciousness: awake and alert  Pain management: adequate  Airway patency: patent  Anesthetic complications: no  Cardiovascular status: acceptable  Respiratory status: acceptable  Hydration status: acceptable  May dismiss recovered patient based on consultation with the appropriate physicians and/or meeting appropriate discharge criteria

## 2018-10-26 LAB
ANION GAP SERPL CALC-SCNC: 8 MMOL/L (ref 3–11)
BASOPHILS # BLD AUTO: 0 10*3/UL
BASOPHILS NFR BLD AUTO: 1 % (ref 0–2)
BUN SERPL-MCNC: 8 MG/DL (ref 7–25)
CALCIUM SERPL-MCNC: 8.2 MG/DL (ref 8.6–10.3)
CHLORIDE SERPL-SCNC: 103 MMOL/L (ref 98–107)
CO2 SERPL-SCNC: 26 MMOL/L (ref 21–32)
CREAT SERPL-MCNC: 0.51 MG/DL (ref 0.6–1.2)
EOSINOPHIL # BLD AUTO: 0 10*3/UL
EOSINOPHIL NFR BLD AUTO: 0 % (ref 0–3)
ERYTHROCYTE [DISTWIDTH] IN BLOOD BY AUTOMATED COUNT: 14.4 % (ref 11.5–14)
GFR SERPL CREATININE-BSD FRML MDRD: 119 ML/MIN/1.73M*2
GLUCOSE BLDC GLUCOMTR-MCNC: 116 MG/DL (ref 70–105)
GLUCOSE BLDC GLUCOMTR-MCNC: 124 MG/DL (ref 70–105)
GLUCOSE BLDC GLUCOMTR-MCNC: 126 MG/DL (ref 70–105)
GLUCOSE BLDC GLUCOMTR-MCNC: 128 MG/DL (ref 70–105)
GLUCOSE BLDC GLUCOMTR-MCNC: 99 MG/DL (ref 70–105)
GLUCOSE SERPL-MCNC: 115 MG/DL (ref 70–105)
HCT VFR BLD AUTO: 29.4 % (ref 34–45)
HGB BLD-MCNC: 9.4 G/DL (ref 11.5–15.5)
HYPOCHROMIA PRESENCE IN BLOOD, ANALYZER: ABNORMAL
LYMPHOCYTES # BLD AUTO: 1.2 10*3/UL
LYMPHOCYTES NFR BLD AUTO: 14 % (ref 11–47)
MCH RBC QN AUTO: 25.5 PG (ref 28–33)
MCHC RBC AUTO-ENTMCNC: 32.1 G/DL (ref 32–36)
MCV RBC AUTO: 79.5 FL (ref 81–97)
MICROCYTOSIS PRESENCE IN BLOOD, ANALYZER: ABNORMAL
MONOCYTES # BLD AUTO: 0.8 10*3/UL
MONOCYTES NFR BLD AUTO: 9 % (ref 3–11)
NEUTROPHILS # BLD AUTO: 6.6 10*3/UL
NEUTROPHILS NFR BLD AUTO: 77 % (ref 41–81)
PLATELET # BLD AUTO: 352 10*3/UL (ref 140–350)
PMV BLD AUTO: 6.8 FL (ref 6.9–10.8)
POTASSIUM SERPL-SCNC: 3.8 MMOL/L (ref 3.5–5.1)
RBC # BLD AUTO: 3.7 10*6/ΜL (ref 3.7–5.3)
SODIUM SERPL-SCNC: 137 MMOL/L (ref 135–145)
WBC # BLD AUTO: 8.7 10*3/UL (ref 4.5–10.5)

## 2018-10-26 PROCEDURE — 6360000200 HC RX 636 W HCPCS (ALT 250 FOR IP): Performed by: SURGERY

## 2018-10-26 PROCEDURE — 6370000100 HC RX 637 (ALT 250 FOR IP): Performed by: SURGERY

## 2018-10-26 PROCEDURE — 90686 IIV4 VACC NO PRSV 0.5 ML IM: CPT | Performed by: SURGERY

## 2018-10-26 PROCEDURE — 82962 GLUCOSE BLOOD TEST: CPT

## 2018-10-26 PROCEDURE — (BLANK) HC ROOM PRIVATE

## 2018-10-26 PROCEDURE — 90471 IMMUNIZATION ADMIN: CPT | Performed by: SURGERY

## 2018-10-26 PROCEDURE — 94799 UNLISTED PULMONARY SVC/PX: CPT

## 2018-10-26 PROCEDURE — 36415 COLL VENOUS BLD VENIPUNCTURE: CPT | Performed by: SURGERY

## 2018-10-26 PROCEDURE — 80048 BASIC METABOLIC PNL TOTAL CA: CPT | Performed by: SURGERY

## 2018-10-26 PROCEDURE — 2580000300 HC RX 258: Performed by: SURGERY

## 2018-10-26 PROCEDURE — 85025 COMPLETE CBC W/AUTO DIFF WBC: CPT | Performed by: SURGERY

## 2018-10-26 RX ORDER — ENOXAPARIN SODIUM 100 MG/ML
40 INJECTION SUBCUTANEOUS
Status: DISCONTINUED | OUTPATIENT
Start: 2018-10-26 | End: 2018-10-27 | Stop reason: HOSPADM

## 2018-10-26 RX ADMIN — INFLUENZA VIRUS VACCINE 0.5 ML: 15; 15; 15; 15 SUSPENSION INTRAMUSCULAR at 13:40

## 2018-10-26 RX ADMIN — OXYCODONE HYDROCHLORIDE 5 MG: 5 TABLET ORAL at 10:12

## 2018-10-26 RX ADMIN — LOSARTAN POTASSIUM 50 MG: 50 TABLET, FILM COATED ORAL at 08:13

## 2018-10-26 RX ADMIN — ENOXAPARIN SODIUM 40 MG: 40 INJECTION SUBCUTANEOUS at 13:39

## 2018-10-26 RX ADMIN — OXYCODONE HYDROCHLORIDE 5 MG: 5 TABLET ORAL at 19:13

## 2018-10-26 RX ADMIN — SODIUM CHLORIDE, POTASSIUM CHLORIDE, SODIUM LACTATE AND CALCIUM CHLORIDE 125 ML/HR: 600; 310; 30; 20 INJECTION, SOLUTION INTRAVENOUS at 01:41

## 2018-10-26 RX ADMIN — OXYCODONE HYDROCHLORIDE 5 MG: 5 TABLET ORAL at 14:42

## 2018-10-26 NOTE — PLAN OF CARE
Problem: Knowledge Deficit  Goal: Patient/family/caregiver demonstrates understanding of disease process, treatment plan, medications, and discharge instructions  INTERVENTIONS:   1. Complete learning assessment and assess knowledge base  2. Provide teaching at level of understanding   3. Provide teaching via preferred learning methods  Outcome: Progressing   10/25/18 2253   Interventions Appropriate for this Patient   Patient/family/caregiver demonstrates understanding of disease process, treatment plan, medications, and discharge instructions Provide teaching at level of understanding       Problem: Potential for Compromised Skin Integrity  Goal: Skin Integrity is Maintained or Improved  INTERVENTIONS:  1. Assess and monitor skin integrity  2. Collaborate with interdisciplinary team and initiate plans and interventions as needed  3. Alternate a full bath with partial baths for elderly   4. Monitor patient's hygiene practices   5. Collaborate with wound, ostomy, and continence nurse  Outcome: Progressing   10/25/18 2253   Interventions Appropriate for this Patient   Skin integrity is maintained or improved Assess and monitor skin integrity     Goal: Nutritional status is improving  INTERVENTIONS:  1. Monitor and assess patient for malnutrition (ex- brittle hair, bruises, dry skin, pale skin and conjunctiva, muscle wasting, smooth red tongue, and disorientation)  2. Monitor patient's weight and dietary intake as ordered or per policy  3. Determine patient's food preferences and provide high-protein, high-caloric foods as appropriate  4. Assist patient with eating   5. Allow adequate time for meals   6. Encourage patient to take dietary supplement as ordered   7. Collaborate with dietitian  8. Include patient/family/caregiver in decisions related to nutrition  Outcome: Progressing   10/25/18 2253   Interventions Appropriate for this Patient   Nutritional status is improving Allow adequate time for meals     Goal:  MOBILITY IS MAINTAINED OR IMPROVED  INTERVENTIONS  1. Collaborate with interdisciplinary team and initiate plan and interventions as ordered (PT/OT)  2. Encourage ambulation  3. Up to chair for meals  4. Monitor for signs of deconditioning  Outcome: Progressing   10/25/18 2253   Interventions Appropriate for this Patient   Mobility is Maintained or Improved Collaborate with interdisciplinary team and initiate plan and interventions as ordered (PT/OT);Encourage ambulation;Up to chair for meals       Problem: Urinary Incontinence  Goal: Perineal skin integrity is maintained or improved  INTERVENTIONS:  1. Assess genitourinary system, perineal skin, labs (urinalysis), and history of incontinence to include past management, aggravating, and alleviating factors  2. Collaborate with interdisciplinary team including wound, ostomy, and continence nurse and initiate plans and interventions as needed  4. Consider urine containment device  5. Apply skin protectant   6. Develop skin care regimen  7. Provide privacy when changing patient's incontinence device to maintain their dignity  Outcome: Progressing   10/25/18 2253   Interventions Appropriate for this Patient   Perineal skin integrity is maintained or improved Develop skin care regimen       Problem: Gastrointestinal - Adult  Goal: Minimal or absence of nausea and vomiting  INTERVENTIONS:  1. Ensure adequate hydration  2. Monitor intake and output  3. Maintain NPO status until nausea and vomiting are resolved  4. Nasogastric tube to low intermittent suction as ordered  5. Administer ordered antiemetic medications as needed  6. Provide nonpharmacologic comfort measures as appropriate  7. Advance diet as ordered  8. Nutrition consult as indicated   Outcome: Progressing   10/25/18 2253   Interventions Appropriate for this Patient   Minimal or absence of nausea and vomiting Ensure adequate hydration;Monitor intake and output     Goal: Maintains or returns to baseline  digestive function  INTERVENTIONS:  1. Assess bowel function  2. Ensure adequate hydration  3. Administer ordered medications as needed  4. Encourage mobilization and activity  5. Nutrition consult as indicated  6. Assess hydration and nutritional status  7. Assess characteristics and frequency of stool  8. Monitor for metabolic panel imbalances  9. Assess for treatment effectiveness  Outcome: Progressing   10/25/18 2253   Interventions Appropriate for this Patient   Maintains or returns to baseline bowel function Assess bowel function;Ensure adequate hydration;Encourage mobilization and activity;Assess hydration and nutritional status     Goal: Maintains adequate nutritional intake  INTERVENTIONS:  1. Monitor percentage of each meal consumed  2. Identify factors contributing to decreased intake, treat as appropriate  3. Assist with meals as needed  4. Monitor I&O, weight and lab values  5. Obtain nutritional consult as indicated  6. Administer alternative nutrition interventions as ordered  Outcome: Progressing   10/25/18 2253   Interventions Appropriate for this Patient   Maintains adequate nutritional intake Monitor percentage of each meal consumed;Monitor I&O, weight and lab values     Goal: Establish and maintain optimal ostomy function  INTERVENTIONS:  1. Assess bowel function  2. Encourage mobilization and activity  3. Assess ostomy stoma characteristics  4. Assess skin surrounding ostomy stoma  5. Empty/Change ostomy pouch as needed  6. Provide ostomy care  7. Consult Wound Care/Ostomy Nurses as indicated  8. Nutrition consult as indicated  Outcome: Progressing   10/25/18 2253   Interventions Appropriate for this Patient   Establish and maintain optimal ostomy function Assess bowel function;Encourage mobilization and activity     Goal: Maintains Optimal Tissue Perfusion  INTERVENTIONS:  1. Monitor for increased abdominal girth, firmness or intra-abdominal pressure  2. Monitor nutritional intake,  intake/output, and weight  3. Assess for signs of dehydration  4. Assess blood pressure, heart rate, and oxygen saturation  5. Assess skin color, capillary refill and edema  6. Monitor metabolic panels, blood count panels, and coagulations panels as ordered  7. Assess bowel function  8. Assess for blood in emesis and stool  Outcome: Progressing   10/25/18 2253   Interventions Appropriate for this Patient   Maintains Optimal Tissue Perfusion Assess for signs of dehydration;Assess blood pressure, heart rate, and oxygen saturation;Assess bowel function     Goal: Nutrient intake appropriate for improving, restoring or maintaining nutritional needs  INTERVENTIONS:  1. Assess nutritional status  2. Monitor oral intake, labs, and treatment plans  3. Provide appropriate diets, oral nutritional supplements, and vitamin/mineral supplements  4. Monitor, and adjust tube feedings and TPN/PPN based on assessed needs  5. Provide specific nutrition education as appropriate  Outcome: Progressing   10/25/18 2253   Interventions Appropriate for this Patient   Nutrient intake appropriate for improving, restoring or maintaining nutritional needs Monitor oral intake, labs, and treatment plans       Problem: Pain - Adult  Goal: Verbalizes/displays adequate comfort level or baseline comfort level  INTERVENTIONS:  1. Encourage patient to monitor pain and request interventions  2. Assess pain using the appropriate pain scale  3. Administer analgesics based on type and severity of pain and evaluate response  4. Educate/Implement non-pharmacological measures as appropriate and evaluate response  5. Consider cultural, developmental and social influences on pain and pain management  6. Notify Provider if interventions unsuccessful or patient reports new pain  Outcome: Progressing   10/25/18 2253   Interventions Appropriate for this Patient   Verbalizes/displays adequate comfort level or baseline comfort level Encourage patient to monitor pain  and request interventions;Assess pain using the appropriate pain scale;Administer analgesics based on type and severity of pain and evaluate response       Problem: Safety Adult  Goal: Patient will remain safe during hospitalization  INTERVENTIONS    1. Assess patient for fall risk and implement interventions if needed  2. Use safe transport techniques  3. Assess patient using the Jon skin assessment scale  4. Assess patient for risk of aspiration  5. Assess patient for risk of elopement  Outcome: Progressing   10/25/18 8613   Interventions Appropriate for this Patient   Patient will remain safe durning hospitalization Assess patient for Fall Risk;Use safe transport

## 2018-10-26 NOTE — PROGRESS NOTES
General Surgery Progress Note    10/26/18  7:01 AM    Patient is doing very well this morning.  She has not passed flatus yet but has lots of bowel activity.  No nausea or vomiting with clears.  Abdominal pain is well controlled currently.  She is awake and alert with current regimen    Physical Exam   Constitutional: She appears well-developed and well-nourished.   Cardiovascular: Normal rate, regular rhythm and normal heart sounds.   Pulmonary/Chest: Effort normal and breath sounds normal.   Abdominal: Soft. Bowel sounds are normal. There is tenderness.   Vitals reviewed.        Intake/Output Summary (Last 24 hours) at 10/26/2018 0701  Last data filed at 10/26/2018 0451  Gross per 24 hour   Intake 4925 ml   Output 1715 ml   Net 3210 ml       CBC with Platelet:    Lab Results   Component Value Date    WBC 8.7 10/26/2018    HGB 9.4 (L) 10/26/2018    HCT 29.4 (L) 10/26/2018     (H) 10/26/2018    RBC 3.70 10/26/2018    MCV 79.5 (L) 10/26/2018    MCH 25.5 (L) 10/26/2018    MCHC 32.1 10/26/2018    RDW 14.4 (H) 10/26/2018    MPV 6.8 (L) 10/26/2018     Comp:   Lab Results   Component Value Date     10/26/2018    K 3.8 10/26/2018     10/26/2018    CO2 26 10/26/2018    BUN 8 10/26/2018    CREATININE 0.51 (L) 10/26/2018    GLUCOSE 115 (H) 10/26/2018    CALCIUM 8.2 (L) 10/26/2018    PROT 6.7 10/09/2018    ALBUMIN 3.8 10/09/2018    AST 11 10/09/2018    ALT 7 10/09/2018    ALKPHOS 74 10/09/2018    BILITOT 0.36 10/09/2018     T(CRP)@  Magnesium: No results found for: MG    Ct Abdomen Pelvis With Iv Contrast    Result Date: 10/12/2018  Narrative: Exam: CT of the abdomen and pelvis with contrast from 10/12/2018    Clinical History: RLQ abdominal pain; rlq abdominal pain  anemic Comparison(s): None Technique: Helical axial imaging was performed through the abdomen and pelvis after the intravenous administration of 70 cc of Isovue-370 and with enteric contrast. Coronal and sagittal reformatted images were  generated. Dose Reduction Technique: Dose reduction technique utilized; automatic/anatomic modulation of X-ray tube current (Auto mA). Findings: There is dependent atelectasis. The spleen appears grossly unremarkable. There is no adrenal mass. There is diffuse hepatic steatosis. Cholecystectomy. The pancreas appears grossly unremarkable. The kidneys are symmetric in size and shape. There is no hydronephrosis. The abdominal aorta contains mild atherosclerotic calcification without aneurysm. There is no periaortic lymphadenopathy. There is a small sliding-type hiatal hernia. There is an irregular-shaped circumferential mass of the cecum/proximal ascending colon (axial series 2 image 42). This measures over a length of approximately 6 cm, although is difficult to evaluate. There is a probable area of necrosis within the inferior aspect of the mass (axial series 2 image 46). There are enlarged lymph nodes within the right lower quadrant (axial series 2 image 47). Oral contrast is seen within the descending colon. The bladder appears unremarkable. The uterus is surgically absent. There are calcified pelvic phleboliths. There are surgical clips within the pelvis. There is no inguinal adenopathy. There are degenerative changes of the lumbar spine with facet arthropathy. There is a probable bone island within the L2 and L5 vertebral bodies.     Impression: IMPRESSION: Irregular wall thickening of the cecum and proximal ascending colon. This is most consistent with a colonic neoplasm. This extends over a length of approximately 6 cm. There is lymphadenopathy within the right lower quadrant.      Assessment and Plan  Principal Problem:    Malignant neoplasm of ascending colon (CMS/HCC) (HCC)  Active Problems:    Colon cancer (CMS/HCC) (HCC)      Assessment/Plan      Postop day #1 status post laparoscopic right hemicolectomy    We will saline lock IV today    Blood work looks good.  Okay to start Lovenox 40 mg subcu daily  order placed    Patient may shower today    Ambulate in the hallways today    Will advance to full liquid diet today.      DINESH RODRIGUEZ MD

## 2018-10-26 NOTE — PLAN OF CARE
Problem: Knowledge Deficit  Goal: Patient/family/caregiver demonstrates understanding of disease process, treatment plan, medications, and discharge instructions  INTERVENTIONS:   1. Complete learning assessment and assess knowledge base  2. Provide teaching at level of understanding   3. Provide teaching via preferred learning methods  Outcome: Progressing   10/26/18 0940   Interventions Appropriate for this Patient   Patient/family/caregiver demonstrates understanding of disease process, treatment plan, medications, and discharge instructions Complete learning assessment and assess knowledge base;Provide teaching at level of understanding;Provide teaching via preferred learning methods       Problem: Potential for Compromised Skin Integrity  Goal: Skin Integrity is Maintained or Improved  INTERVENTIONS:  1. Assess and monitor skin integrity  2. Collaborate with interdisciplinary team and initiate plans and interventions as needed  3. Alternate a full bath with partial baths for elderly   4. Monitor patient's hygiene practices   5. Collaborate with wound, ostomy, and continence nurse  Outcome: Progressing   10/26/18 0940   Interventions Appropriate for this Patient   Skin integrity is maintained or improved Assess and monitor skin integrity;Monitor patient's hygiene practices     Goal: Nutritional status is improving  INTERVENTIONS:  1. Monitor and assess patient for malnutrition (ex- brittle hair, bruises, dry skin, pale skin and conjunctiva, muscle wasting, smooth red tongue, and disorientation)  2. Monitor patient's weight and dietary intake as ordered or per policy  3. Determine patient's food preferences and provide high-protein, high-caloric foods as appropriate  4. Assist patient with eating   5. Allow adequate time for meals   6. Encourage patient to take dietary supplement as ordered   7. Collaborate with dietitian  8. Include patient/family/caregiver in decisions related to nutrition  Outcome:  Progressing   10/26/18 0940   Interventions Appropriate for this Patient   Nutritional status is improving Monitor and assess patient for malnutrition (ex- brittle hair, bruises, dry skin, pale skin and conjunctiva, muscle wasting, smooth red tongue, and disorientation);Allow adequate time for meals;Include patient/family/caregiver in decisions related to nutrition;Determine patient's food preferences and provide high-protein, high-caloric foods as appropriate     Goal: MOBILITY IS MAINTAINED OR IMPROVED  INTERVENTIONS  1. Collaborate with interdisciplinary team and initiate plan and interventions as ordered (PT/OT)  2. Encourage ambulation  3. Up to chair for meals  4. Monitor for signs of deconditioning  Outcome: Progressing   10/26/18 0940   Interventions Appropriate for this Patient   Mobility is Maintained or Improved Collaborate with interdisciplinary team and initiate plan and interventions as ordered (PT/OT);Encourage ambulation;Up to chair for meals;Monitor for signs of deconditioning       Problem: Urinary Incontinence  Goal: Perineal skin integrity is maintained or improved  INTERVENTIONS:  1. Assess genitourinary system, perineal skin, labs (urinalysis), and history of incontinence to include past management, aggravating, and alleviating factors  2. Collaborate with interdisciplinary team including wound, ostomy, and continence nurse and initiate plans and interventions as needed  4. Consider urine containment device  5. Apply skin protectant   6. Develop skin care regimen  7. Provide privacy when changing patient's incontinence device to maintain their dignity  Outcome: Progressing   10/26/18 0940   Interventions Appropriate for this Patient   Perineal skin integrity is maintained or improved Assess genitourinary system, perineal skin, labs (urinalysis), and history of incontinence to include past management, aggravating, and alleviating factors;Provide privacy when changing patient's incontinence  device to maintain their dignity;Develop skin care regimen       Problem: Gastrointestinal - Adult  Goal: Minimal or absence of nausea and vomiting  INTERVENTIONS:  1. Ensure adequate hydration  2. Monitor intake and output  3. Maintain NPO status until nausea and vomiting are resolved  4. Nasogastric tube to low intermittent suction as ordered  5. Administer ordered antiemetic medications as needed  6. Provide nonpharmacologic comfort measures as appropriate  7. Advance diet as ordered  8. Nutrition consult as indicated   Outcome: Progressing   10/26/18 0940   Interventions Appropriate for this Patient   Minimal or absence of nausea and vomiting Ensure adequate hydration;Monitor intake and output;Advance diet as ordered;Provide nonpharmacologic comfort measures as appropriate     Goal: Maintains or returns to baseline digestive function  INTERVENTIONS:  1. Assess bowel function  2. Ensure adequate hydration  3. Administer ordered medications as needed  4. Encourage mobilization and activity  5. Nutrition consult as indicated  6. Assess hydration and nutritional status  7. Assess characteristics and frequency of stool  8. Monitor for metabolic panel imbalances  9. Assess for treatment effectiveness  Outcome: Progressing   10/26/18 0940   Interventions Appropriate for this Patient   Maintains or returns to baseline bowel function Assess bowel function;Ensure adequate hydration;Administer ordered medications as needed;Encourage mobilization and activity;Nutrition consult as indicated;Assess hydration and nutritional status;Assess characteristics and frequency of stool;Monitor for metabolic panel imbalances;Assess for treatment effectiveness     Goal: Maintains adequate nutritional intake  INTERVENTIONS:  1. Monitor percentage of each meal consumed  2. Identify factors contributing to decreased intake, treat as appropriate  3. Assist with meals as needed  4. Monitor I&O, weight and lab values  5. Obtain nutritional  consult as indicated  6. Administer alternative nutrition interventions as ordered   10/26/18 0940   Interventions Appropriate for this Patient   Maintains adequate nutritional intake Monitor percentage of each meal consumed;Identify factors contributing to decreased intake, treat as appropriate;Assist with meals as needed;Monitor I&O, weight and lab values;Obtain nutritional consult as indicated;Administer alternative nutrition interventions as ordered     Goal: Establish and maintain optimal ostomy function  INTERVENTIONS:  1. Assess bowel function  2. Encourage mobilization and activity  3. Assess ostomy stoma characteristics  4. Assess skin surrounding ostomy stoma  5. Empty/Change ostomy pouch as needed  6. Provide ostomy care  7. Consult Wound Care/Ostomy Nurses as indicated  8. Nutrition consult as indicated  Outcome: Progressing   10/26/18 0940   Interventions Appropriate for this Patient   Establish and maintain optimal ostomy function Assess bowel function;Encourage mobilization and activity     Goal: Maintains Optimal Tissue Perfusion  INTERVENTIONS:  1. Monitor for increased abdominal girth, firmness or intra-abdominal pressure  2. Monitor nutritional intake, intake/output, and weight  3. Assess for signs of dehydration  4. Assess blood pressure, heart rate, and oxygen saturation  5. Assess skin color, capillary refill and edema  6. Monitor metabolic panels, blood count panels, and coagulations panels as ordered  7. Assess bowel function  8. Assess for blood in emesis and stool  Outcome: Progressing   10/26/18 0940   Interventions Appropriate for this Patient   Maintains Optimal Tissue Perfusion Monitor for increased abdominal girth, firmness or intra-abdominal pressure;Monitor nutritional intake, intake/output, and weight;Assess for signs of dehydration;Assess blood pressure, heart rate, and oxygen saturation;Assess skin color, capillary refill and edema;Monitor metabolic panels, blood count panels, and  coagulations panels as ordered;Assess bowel function;Assess for blood in emesis and stool     Goal: Nutrient intake appropriate for improving, restoring or maintaining nutritional needs  INTERVENTIONS:  1. Assess nutritional status  2. Monitor oral intake, labs, and treatment plans  3. Provide appropriate diets, oral nutritional supplements, and vitamin/mineral supplements  4. Monitor, and adjust tube feedings and TPN/PPN based on assessed needs  5. Provide specific nutrition education as appropriate  Outcome: Progressing   10/26/18 0940   Interventions Appropriate for this Patient   Nutrient intake appropriate for improving, restoring or maintaining nutritional needs Assess nutritional status;Monitor oral intake, labs, and treatment plans;Provide appropriate diets, oral nutritional supplements, and vitamin/mineral supplements;Provide specific nutrition education as appropriate       Problem: Pain - Adult  Goal: Verbalizes/displays adequate comfort level or baseline comfort level  INTERVENTIONS:  1. Encourage patient to monitor pain and request interventions  2. Assess pain using the appropriate pain scale  3. Administer analgesics based on type and severity of pain and evaluate response  4. Educate/Implement non-pharmacological measures as appropriate and evaluate response  5. Consider cultural, developmental and social influences on pain and pain management  6. Notify Provider if interventions unsuccessful or patient reports new pain  Outcome: Progressing   10/26/18 0940   Interventions Appropriate for this Patient   Verbalizes/displays adequate comfort level or baseline comfort level Encourage patient to monitor pain and request interventions;Assess pain using the appropriate pain scale;Administer analgesics based on type and severity of pain and evaluate response;Educate/Implement non-pharmacological measures as appropriate and evaluate response;Consider cultural, developmental and social influences on pain and  pain management;Notify Provider if interventions unsuccessful or patient reports new pain       Problem: Safety Adult  Goal: Patient will remain safe during hospitalization  INTERVENTIONS    1. Assess patient for fall risk and implement interventions if needed  2. Use safe transport techniques  3. Assess patient using the Jon skin assessment scale  4. Assess patient for risk of aspiration  5. Assess patient for risk of elopement  Outcome: Progressing   10/26/18 0940   Interventions Appropriate for this Patient   Patient will remain safe durning hospitalization Assess patient for Fall Risk;Use safe transport;Assess Patient using the Jon scale

## 2018-10-27 VITALS
DIASTOLIC BLOOD PRESSURE: 68 MMHG | HEART RATE: 88 BPM | OXYGEN SATURATION: 93 % | BODY MASS INDEX: 20.85 KG/M2 | RESPIRATION RATE: 18 BRPM | TEMPERATURE: 98.8 F | SYSTOLIC BLOOD PRESSURE: 163 MMHG | WEIGHT: 114 LBS

## 2018-10-27 LAB
GLUCOSE BLDC GLUCOMTR-MCNC: 102 MG/DL (ref 70–105)
GLUCOSE BLDC GLUCOMTR-MCNC: 105 MG/DL (ref 70–105)
GLUCOSE BLDC GLUCOMTR-MCNC: 113 MG/DL (ref 70–105)

## 2018-10-27 PROCEDURE — 6370000100 HC RX 637 (ALT 250 FOR IP): Performed by: SURGERY

## 2018-10-27 PROCEDURE — 82962 GLUCOSE BLOOD TEST: CPT

## 2018-10-27 RX ORDER — OXYCODONE HYDROCHLORIDE 5 MG/1
5-10 TABLET ORAL EVERY 4 HOURS PRN
Qty: 20 TABLET | Refills: 0 | Status: SHIPPED | OUTPATIENT
Start: 2018-10-27 | End: 2018-11-06

## 2018-10-27 RX ORDER — ACETAMINOPHEN 500 MG
1000 TABLET ORAL EVERY 8 HOURS PRN
Refills: 0
Start: 2018-10-27

## 2018-10-27 RX ORDER — AMOXICILLIN 250 MG
1 CAPSULE ORAL 2 TIMES DAILY PRN
Qty: 30 TABLET | Refills: 0 | Status: SHIPPED | OUTPATIENT
Start: 2018-10-27 | End: 2019-10-27

## 2018-10-27 RX ADMIN — OXYCODONE HYDROCHLORIDE 5 MG: 5 TABLET ORAL at 04:15

## 2018-10-27 RX ADMIN — LOSARTAN POTASSIUM 50 MG: 50 TABLET, FILM COATED ORAL at 09:47

## 2018-10-27 NOTE — PLAN OF CARE
Problem: Knowledge Deficit  Goal: Patient/family/caregiver demonstrates understanding of disease process, treatment plan, medications, and discharge instructions  INTERVENTIONS:   1. Complete learning assessment and assess knowledge base  2. Provide teaching at level of understanding   3. Provide teaching via preferred learning methods  Outcome: Progressing   10/26/18 0940   Interventions Appropriate for this Patient   Patient/family/caregiver demonstrates understanding of disease process, treatment plan, medications, and discharge instructions Complete learning assessment and assess knowledge base;Provide teaching at level of understanding;Provide teaching via preferred learning methods

## 2018-10-27 NOTE — DISCHARGE INSTR - APPOINTMENTS
Call the [facility] on Monday to schedule post-op appt. For 2-3 weeks with Dr. Rodriguez.  [phone]

## 2018-10-27 NOTE — PLAN OF CARE
Problem: Knowledge Deficit  Goal: Patient/family/caregiver demonstrates understanding of disease process, treatment plan, medications, and discharge instructions  INTERVENTIONS:   1. Complete learning assessment and assess knowledge base  2. Provide teaching at level of understanding   3. Provide teaching via preferred learning methods  Outcome: Progressing   10/26/18 0940   Interventions Appropriate for this Patient   Patient/family/caregiver demonstrates understanding of disease process, treatment plan, medications, and discharge instructions Complete learning assessment and assess knowledge base;Provide teaching at level of understanding;Provide teaching via preferred learning methods       Problem: Potential for Compromised Skin Integrity  Goal: Skin Integrity is Maintained or Improved  INTERVENTIONS:  1. Assess and monitor skin integrity  2. Collaborate with interdisciplinary team and initiate plans and interventions as needed  3. Alternate a full bath with partial baths for elderly   4. Monitor patient's hygiene practices   5. Collaborate with wound, ostomy, and continence nurse  Outcome: Progressing   10/26/18 0940   Interventions Appropriate for this Patient   Skin integrity is maintained or improved Assess and monitor skin integrity;Monitor patient's hygiene practices     Goal: Nutritional status is improving  INTERVENTIONS:  1. Monitor and assess patient for malnutrition (ex- brittle hair, bruises, dry skin, pale skin and conjunctiva, muscle wasting, smooth red tongue, and disorientation)  2. Monitor patient's weight and dietary intake as ordered or per policy  3. Determine patient's food preferences and provide high-protein, high-caloric foods as appropriate  4. Assist patient with eating   5. Allow adequate time for meals   6. Encourage patient to take dietary supplement as ordered   7. Collaborate with dietitian  8. Include patient/family/caregiver in decisions related to nutrition  Outcome:  Progressing   10/26/18 0940   Interventions Appropriate for this Patient   Nutritional status is improving Monitor and assess patient for malnutrition (ex- brittle hair, bruises, dry skin, pale skin and conjunctiva, muscle wasting, smooth red tongue, and disorientation);Allow adequate time for meals;Include patient/family/caregiver in decisions related to nutrition;Determine patient's food preferences and provide high-protein, high-caloric foods as appropriate     Goal: MOBILITY IS MAINTAINED OR IMPROVED  INTERVENTIONS  1. Collaborate with interdisciplinary team and initiate plan and interventions as ordered (PT/OT)  2. Encourage ambulation  3. Up to chair for meals  4. Monitor for signs of deconditioning  Outcome: Progressing   10/26/18 0940   Interventions Appropriate for this Patient   Mobility is Maintained or Improved Collaborate with interdisciplinary team and initiate plan and interventions as ordered (PT/OT);Encourage ambulation;Up to chair for meals;Monitor for signs of deconditioning       Problem: Urinary Incontinence  Goal: Perineal skin integrity is maintained or improved  INTERVENTIONS:  1. Assess genitourinary system, perineal skin, labs (urinalysis), and history of incontinence to include past management, aggravating, and alleviating factors  2. Collaborate with interdisciplinary team including wound, ostomy, and continence nurse and initiate plans and interventions as needed  4. Consider urine containment device  5. Apply skin protectant   6. Develop skin care regimen  7. Provide privacy when changing patient's incontinence device to maintain their dignity  Outcome: Progressing   10/26/18 0940   Interventions Appropriate for this Patient   Perineal skin integrity is maintained or improved Assess genitourinary system, perineal skin, labs (urinalysis), and history of incontinence to include past management, aggravating, and alleviating factors;Provide privacy when changing patient's incontinence  device to maintain their dignity;Develop skin care regimen       Problem: Gastrointestinal - Adult  Goal: Minimal or absence of nausea and vomiting  INTERVENTIONS:  1. Ensure adequate hydration  2. Monitor intake and output  3. Maintain NPO status until nausea and vomiting are resolved  4. Nasogastric tube to low intermittent suction as ordered  5. Administer ordered antiemetic medications as needed  6. Provide nonpharmacologic comfort measures as appropriate  7. Advance diet as ordered  8. Nutrition consult as indicated   Outcome: Progressing   10/26/18 0940   Interventions Appropriate for this Patient   Minimal or absence of nausea and vomiting Ensure adequate hydration;Monitor intake and output;Advance diet as ordered;Provide nonpharmacologic comfort measures as appropriate     Goal: Maintains or returns to baseline digestive function  INTERVENTIONS:  1. Assess bowel function  2. Ensure adequate hydration  3. Administer ordered medications as needed  4. Encourage mobilization and activity  5. Nutrition consult as indicated  6. Assess hydration and nutritional status  7. Assess characteristics and frequency of stool  8. Monitor for metabolic panel imbalances  9. Assess for treatment effectiveness  Outcome: Progressing   10/26/18 0940   Interventions Appropriate for this Patient   Maintains or returns to baseline bowel function Assess bowel function;Ensure adequate hydration;Administer ordered medications as needed;Encourage mobilization and activity;Nutrition consult as indicated;Assess hydration and nutritional status;Assess characteristics and frequency of stool;Monitor for metabolic panel imbalances;Assess for treatment effectiveness     Goal: Maintains adequate nutritional intake  INTERVENTIONS:  1. Monitor percentage of each meal consumed  2. Identify factors contributing to decreased intake, treat as appropriate  3. Assist with meals as needed  4. Monitor I&O, weight and lab values  5. Obtain nutritional  consult as indicated  6. Administer alternative nutrition interventions as ordered  Outcome: Progressing   10/26/18 0940   Interventions Appropriate for this Patient   Maintains adequate nutritional intake Monitor percentage of each meal consumed;Identify factors contributing to decreased intake, treat as appropriate;Assist with meals as needed;Monitor I&O, weight and lab values;Obtain nutritional consult as indicated;Administer alternative nutrition interventions as ordered     Goal: Establish and maintain optimal ostomy function  INTERVENTIONS:  1. Assess bowel function  2. Encourage mobilization and activity  3. Assess ostomy stoma characteristics  4. Assess skin surrounding ostomy stoma  5. Empty/Change ostomy pouch as needed  6. Provide ostomy care  7. Consult Wound Care/Ostomy Nurses as indicated  8. Nutrition consult as indicated  Outcome: Progressing   10/26/18 0940   Interventions Appropriate for this Patient   Establish and maintain optimal ostomy function Assess bowel function;Encourage mobilization and activity     Goal: Maintains Optimal Tissue Perfusion  INTERVENTIONS:  1. Monitor for increased abdominal girth, firmness or intra-abdominal pressure  2. Monitor nutritional intake, intake/output, and weight  3. Assess for signs of dehydration  4. Assess blood pressure, heart rate, and oxygen saturation  5. Assess skin color, capillary refill and edema  6. Monitor metabolic panels, blood count panels, and coagulations panels as ordered  7. Assess bowel function  8. Assess for blood in emesis and stool  Outcome: Progressing   10/26/18 0940   Interventions Appropriate for this Patient   Maintains Optimal Tissue Perfusion Monitor for increased abdominal girth, firmness or intra-abdominal pressure;Monitor nutritional intake, intake/output, and weight;Assess for signs of dehydration;Assess blood pressure, heart rate, and oxygen saturation;Assess skin color, capillary refill and edema;Monitor metabolic panels,  blood count panels, and coagulations panels as ordered;Assess bowel function;Assess for blood in emesis and stool     Goal: Nutrient intake appropriate for improving, restoring or maintaining nutritional needs  INTERVENTIONS:  1. Assess nutritional status  2. Monitor oral intake, labs, and treatment plans  3. Provide appropriate diets, oral nutritional supplements, and vitamin/mineral supplements  4. Monitor, and adjust tube feedings and TPN/PPN based on assessed needs  5. Provide specific nutrition education as appropriate  Outcome: Progressing   10/26/18 0940   Interventions Appropriate for this Patient   Nutrient intake appropriate for improving, restoring or maintaining nutritional needs Assess nutritional status;Monitor oral intake, labs, and treatment plans;Provide appropriate diets, oral nutritional supplements, and vitamin/mineral supplements;Provide specific nutrition education as appropriate       Problem: Pain - Adult  Goal: Verbalizes/displays adequate comfort level or baseline comfort level  INTERVENTIONS:  1. Encourage patient to monitor pain and request interventions  2. Assess pain using the appropriate pain scale  3. Administer analgesics based on type and severity of pain and evaluate response  4. Educate/Implement non-pharmacological measures as appropriate and evaluate response  5. Consider cultural, developmental and social influences on pain and pain management  6. Notify Provider if interventions unsuccessful or patient reports new pain  Outcome: Progressing   10/26/18 0940   Interventions Appropriate for this Patient   Verbalizes/displays adequate comfort level or baseline comfort level Encourage patient to monitor pain and request interventions;Assess pain using the appropriate pain scale;Administer analgesics based on type and severity of pain and evaluate response;Educate/Implement non-pharmacological measures as appropriate and evaluate response;Consider cultural, developmental and social  influences on pain and pain management;Notify Provider if interventions unsuccessful or patient reports new pain       Problem: Safety Adult  Goal: Patient will remain safe during hospitalization  INTERVENTIONS    1. Assess patient for fall risk and implement interventions if needed  2. Use safe transport techniques  3. Assess patient using the Jon skin assessment scale  4. Assess patient for risk of aspiration  5. Assess patient for risk of elopement  Outcome: Progressing   10/26/18 0940   Interventions Appropriate for this Patient   Patient will remain safe durning hospitalization Assess patient for Fall Risk;Use safe transport;Assess Patient using the Jon scale

## 2018-10-27 NOTE — DISCHARGE SUMMARY
Inpatient Discharge Summary    BRIEF OVERVIEW  Admitting Provider: Tremaine Rodriguez MD  Discharge Provider: Tremaine Rodriguez MD  Primary Care Physician at Discharge: MICHAEL BOYD [phone]     Admission Date: 10/25/2018     Discharge Date: 10/27/2018    Primary Discharge Diagnosis  Malignancy of ascending colon    Secondary Discharge Diagnosis  none    Discharge Disposition  01 - Home or Self-Care  Code Status at Discharge: Full    Active Issues Requiring Follow-up  none    Outpatient Follow-Up  No future appointments.    Referrals and Follow-ups to Schedule     Weight lifting restrictions      No lifting more than 15 lbs for 2 wks and no more than 25 lbs for the next 1 week    Maximum Weight to Lift:  15 Pounds        Test Results Pending at Discharge   Order Current Status    Pathology specimen (Histology) In process          DETAILS OF HOSPITAL STAY    Presenting Problem/History of Present Illness  Malignant neoplasm of ascending colon (CMS/HCC) (HCC) [C18.2]  Colon cancer (CMS/HCC) (HCC) [C18.9]  Shama Caballero is a 73 y.o. female who presents with right lower quadrant pain and what she perceives as a palpable mass for the last several months.  Her iron has been low so she has been taking iron supplementation thus her stools have been dark.  She is taking quite a bit of ibuprofen to try to help with the pain     She is never had a colonoscopy.  No laly blood in her stools that she is noticed.  No fevers or chills.  She is down 4 pounds.  Her appetite is the same as it usually is.  Her bowels are very inconsistent but there is been no dramatic change.     Denies chest pain or shortness of breath with activities     There is no family history of colon cancer.          Hospital Course  Patient admitted to the hospital and taken to the OR for a laparoscopic R hemicolectomy. This was performed without difficulty and she underwent routine convalescence. She was started on a liquid diet on POD 1 and pain was well  controlled with po meds. On POD 1 she began having return of bowel function with flatus and liquid stools. She continued to have bowel function and tolerated a regular diet. She was able to ambulated independently and deemed stable for discharge.     Operative Procedures Performed  Laparoscopic R hemicolectomy    Physical Exam at Discharge  Discharge Condition: good  Heart Rate: 88  Resp: 18  BP: 163/68  Temp: 37.1 °C (98.8 °F)  Weight: 51.7 kg (114 lb)  General: alert, oriented, no distress  Heart: RRR  Lungs: CTA bilaterally  Abdomen: incisions healing well without drainage or discharge, no surrounding erythema and appropriate tenderness

## 2018-10-30 NOTE — PROGRESS NOTES
Can we let her know all surgical margins are free of disease.  Only one lymph node had a few cancer cells in it.  18 other lymph nodes were negative.  She is a stage 3.  I would like her to visit with the chemotherapy doctors when she recovers as I would highly recommend a course of chemotherapy to make sure it never comes back.

## 2018-11-09 ENCOUNTER — OFFICE VISIT (OUTPATIENT)
Dept: SURGERY | Facility: CLINIC | Age: 73
End: 2018-11-09
Payer: MEDICARE

## 2018-11-09 VITALS
HEART RATE: 80 BPM | SYSTOLIC BLOOD PRESSURE: 128 MMHG | HEIGHT: 62 IN | BODY MASS INDEX: 20.06 KG/M2 | WEIGHT: 109 LBS | RESPIRATION RATE: 16 BRPM | DIASTOLIC BLOOD PRESSURE: 72 MMHG | TEMPERATURE: 98.8 F

## 2018-11-09 DIAGNOSIS — C18.2 MALIGNANT NEOPLASM OF ASCENDING COLON (CMS/HCC): Primary | ICD-10-CM

## 2018-11-09 PROCEDURE — 99024 POSTOP FOLLOW-UP VISIT: CPT | Performed by: SURGERY

## 2018-11-09 ASSESSMENT — PAIN SCALES - GENERAL: PAINLEVEL: 0-NO PAIN

## 2018-11-09 NOTE — PROGRESS NOTES
Patient returns today status post laparoscopic right hemicolectomy.  She did very well and was discharged home on postop day #2.  Since that time she has had some mild diarrhea for the first 4 days at home.  This has resolved    Her bowels are now soft.  She is going daily without constipation or blood.    No pain at her incision sites    Appetite is good.  Weight is stable.  She is now walking 1-1.5 miles daily.    Pathology showed stage IIIb colon cancer.  1 of 18 nodes was positive.    Her incisions are clean, dry, intact    Status post laparoscopic right hemicolectomy for colon cancer    Patient may liberalize her diet and activity levels.  No further lifting restrictions    I explained her that she needs medical oncology evaluation.  We briefly discussed [id number] placement should she require it.  Consultation will be requested.  Patient is doing well.

## 2018-11-16 ENCOUNTER — APPOINTMENT (OUTPATIENT)
Dept: ONCOLOGY | Facility: CLINIC | Age: 73
End: 2018-11-16
Payer: MEDICARE

## 2018-11-16 ENCOUNTER — OFFICE VISIT (OUTPATIENT)
Dept: ONCOLOGY | Facility: CLINIC | Age: 73
End: 2018-11-16
Payer: MEDICARE

## 2018-11-16 VITALS
HEART RATE: 91 BPM | SYSTOLIC BLOOD PRESSURE: 144 MMHG | WEIGHT: 109.4 LBS | BODY MASS INDEX: 20.13 KG/M2 | OXYGEN SATURATION: 97 % | TEMPERATURE: 98.1 F | HEIGHT: 62 IN | DIASTOLIC BLOOD PRESSURE: 81 MMHG

## 2018-11-16 DIAGNOSIS — C18.2 MALIGNANT NEOPLASM OF ASCENDING COLON (CMS/HCC): ICD-10-CM

## 2018-11-16 PROBLEM — C18.9 COLON CANCER (CMS/HCC): Status: RESOLVED | Noted: 2018-10-25 | Resolved: 2018-11-16

## 2018-11-16 LAB
ALBUMIN SERPL-MCNC: 4.4 G/DL (ref 3.5–5.3)
ALP SERPL-CCNC: 74 U/L (ref 55–142)
ALT SERPL-CCNC: 13 U/L (ref 0–52)
ANION GAP SERPL CALC-SCNC: 8 MMOL/L (ref 3–11)
AST SERPL-CCNC: 16 U/L (ref 0–39)
BASOPHILS # BLD AUTO: 0 10*3/UL
BASOPHILS NFR BLD AUTO: 1 % (ref 0–2)
BILIRUB SERPL-MCNC: 0.31 MG/DL (ref 0–1.4)
BUN SERPL-MCNC: 17 MG/DL (ref 7–25)
CALCIUM ALBUM COR SERPL-MCNC: 9.6 MG/DL (ref 8.6–10.3)
CALCIUM SERPL-MCNC: 9.9 MG/DL (ref 8.6–10.3)
CEA SERPL-MCNC: 1.4 NG/ML (ref 0–9.9)
CHLORIDE SERPL-SCNC: 103 MMOL/L (ref 98–107)
CO2 SERPL-SCNC: 26 MMOL/L (ref 21–32)
CREAT SERPL-MCNC: 0.7 MG/DL (ref 0.6–1.2)
EOSINOPHIL # BLD AUTO: 0.1 10*3/UL
EOSINOPHIL NFR BLD AUTO: 2 % (ref 0–3)
ERYTHROCYTE [DISTWIDTH] IN BLOOD BY AUTOMATED COUNT: 15.9 % (ref 11.5–14)
GFR SERPL CREATININE-BSD FRML MDRD: 87 ML/MIN/1.73M*2
GLUCOSE SERPL-MCNC: 112 MG/DL (ref 70–105)
HCT VFR BLD AUTO: 37 % (ref 34–45)
HGB BLD-MCNC: 11.7 G/DL (ref 11.5–15.5)
HYPOCHROMIA PRESENCE IN BLOOD, ANALYZER: ABNORMAL
LYMPHOCYTES # BLD AUTO: 1.9 10*3/UL
LYMPHOCYTES NFR BLD AUTO: 32 % (ref 11–47)
MCH RBC QN AUTO: 24.8 PG (ref 28–33)
MCHC RBC AUTO-ENTMCNC: 31.8 G/DL (ref 32–36)
MCV RBC AUTO: 78.2 FL (ref 81–97)
MICROCYTOSIS PRESENCE IN BLOOD, ANALYZER: ABNORMAL
MONOCYTES # BLD AUTO: 0.6 10*3/UL
MONOCYTES NFR BLD AUTO: 10 % (ref 3–11)
NEUTROPHILS # BLD AUTO: 3.4 10*3/UL
NEUTROPHILS NFR BLD AUTO: 56 % (ref 41–81)
PLATELET # BLD AUTO: 295 10*3/UL (ref 140–350)
PMV BLD AUTO: 6.8 FL (ref 6.9–10.8)
POTASSIUM SERPL-SCNC: 4.1 MMOL/L (ref 3.5–5.1)
PROT SERPL-MCNC: 6.6 G/DL (ref 6–8.3)
RBC # BLD AUTO: 4.73 10*6/ΜL (ref 3.7–5.3)
SODIUM SERPL-SCNC: 137 MMOL/L (ref 135–145)
WBC # BLD AUTO: 6 10*3/UL (ref 4.5–10.5)

## 2018-11-16 PROCEDURE — 82378 CARCINOEMBRYONIC ANTIGEN: CPT

## 2018-11-16 PROCEDURE — 80053 COMPREHEN METABOLIC PANEL: CPT

## 2018-11-16 PROCEDURE — 85025 COMPLETE CBC W/AUTO DIFF WBC: CPT

## 2018-11-16 PROCEDURE — 99205 OFFICE O/P NEW HI 60 MIN: CPT | Mod: 24 | Performed by: INTERNAL MEDICINE

## 2018-11-16 PROCEDURE — 36415 COLL VENOUS BLD VENIPUNCTURE: CPT

## 2018-11-16 PROCEDURE — G0463 HOSPITAL OUTPT CLINIC VISIT: HCPCS

## 2018-11-16 ASSESSMENT — PAIN SCALES - GENERAL: PAINLEVEL: 0-NO PAIN

## 2018-11-16 NOTE — PATIENT INSTRUCTIONS
Patient Education   Oxaliplatin Injection  What is this medicine?  OXALIPLATIN (ox AL i GLORIA tin) is a chemotherapy drug. It targets fast dividing cells, like cancer cells, and causes these cells to die. This medicine is used to treat cancers of the colon and rectum, and many other cancers.  This medicine may be used for other purposes; ask your health care provider or pharmacist if you have questions.  COMMON BRAND NAME(S): Eloxatin  What should I tell my health care provider before I take this medicine?  They need to know if you have any of these conditions:  -kidney disease  -an unusual or allergic reaction to oxaliplatin, other chemotherapy, other medicines, foods, dyes, or preservatives  -pregnant or trying to get pregnant  -breast-feeding  How should I use this medicine?  This drug is given as an infusion into a vein. It is administered in a hospital or clinic by a specially trained health care professional.  Talk to your pediatrician regarding the use of this medicine in children. Special care may be needed.  Overdosage: If you think you have taken too much of this medicine contact a poison control center or emergency room at once.  NOTE: This medicine is only for you. Do not share this medicine with others.  What if I miss a dose?  It is important not to miss a dose. Call your doctor or health care professional if you are unable to keep an appointment.  What may interact with this medicine?  -medicines to increase blood counts like filgrastim, pegfilgrastim, sargramostim  -probenecid  -some antibiotics like amikacin, gentamicin, neomycin, polymyxin B, streptomycin, tobramycin  -zalcitabine  Talk to your doctor or health care professional before taking any of these medicines:  -acetaminophen  -aspirin  -ibuprofen  -ketoprofen  -naproxen  This list may not describe all possible interactions. Give your health care provider a list of all the medicines, herbs, non-prescription drugs, or dietary supplements you  use. Also tell them if you smoke, drink alcohol, or use illegal drugs. Some items may interact with your medicine.  What should I watch for while using this medicine?  Your condition will be monitored carefully while you are receiving this medicine. You will need important blood work done while you are taking this medicine.  This medicine can make you more sensitive to cold. Do not drink cold drinks or use ice. Cover exposed skin before coming in contact with cold temperatures or cold objects. When out in cold weather wear warm clothing and cover your mouth and nose to warm the air that goes into your lungs. Tell your doctor if you get sensitive to the cold.  This drug may make you feel generally unwell. This is not uncommon, as chemotherapy can affect healthy cells as well as cancer cells. Report any side effects. Continue your course of treatment even though you feel ill unless your doctor tells you to stop.  In some cases, you may be given additional medicines to help with side effects. Follow all directions for their use.  Call your doctor or health care professional for advice if you get a fever, chills or sore throat, or other symptoms of a cold or flu. Do not treat yourself. This drug decreases your body's ability to fight infections. Try to avoid being around people who are sick.  This medicine may increase your risk to bruise or bleed. Call your doctor or health care professional if you notice any unusual bleeding.  Be careful brushing and flossing your teeth or using a toothpick because you may get an infection or bleed more easily. If you have any dental work done, tell your dentist you are receiving this medicine.  Avoid taking products that contain aspirin, acetaminophen, ibuprofen, naproxen, or ketoprofen unless instructed by your doctor. These medicines may hide a fever.  Do not become pregnant while taking this medicine. Women should inform their doctor if they wish to become pregnant or think they  might be pregnant. There is a potential for serious side effects to an unborn child. Talk to your health care professional or pharmacist for more information. Do not breast-feed an infant while taking this medicine.  Call your doctor or health care professional if you get diarrhea. Do not treat yourself.  What side effects may I notice from receiving this medicine?  Side effects that you should report to your doctor or health care professional as soon as possible:  -allergic reactions like skin rash, itching or hives, swelling of the face, lips, or tongue  -low blood counts - This drug may decrease the number of white blood cells, red blood cells and platelets. You may be at increased risk for infections and bleeding.  -signs of infection - fever or chills, cough, sore throat, pain or difficulty passing urine  -signs of decreased platelets or bleeding - bruising, pinpoint red spots on the skin, black, tarry stools, nosebleeds  -signs of decreased red blood cells - unusually weak or tired, fainting spells, lightheadedness  -breathing problems  -chest pain, pressure  -cough  -diarrhea  -jaw tightness  -mouth sores  -nausea and vomiting  -pain, swelling, redness or irritation at the injection site  -pain, tingling, numbness in the hands or feet  -problems with balance, talking, walking  -redness, blistering, peeling or loosening of the skin, including inside the mouth  -trouble passing urine or change in the amount of urine  Side effects that usually do not require medical attention (report to your doctor or health care professional if they continue or are bothersome):  -changes in vision  -constipation  -hair loss  -loss of appetite  -metallic taste in the mouth or changes in taste  -stomach pain  This list may not describe all possible side effects. Call your doctor for medical advice about side effects. You may report side effects to FDA at 8-563-FDA-0417.  Where should I keep my medicine?  This drug is given in a  hospital or clinic and will not be stored at home.  NOTE: This sheet is a summary. It may not cover all possible information. If you have questions about this medicine, talk to your doctor, pharmacist, or health care provider.  © 2018 [employer] (2009-07-14 17:22:47)       Patient Education   Fluorouracil, 5-FU injection  What is this medicine?  FLUOROURACIL, 5-FU (flure oh YOOR a ramirez) is a chemotherapy drug. It slows the growth of cancer cells. This medicine is used to treat many types of cancer like breast cancer, colon or rectal cancer, pancreatic cancer, and stomach cancer.  This medicine may be used for other purposes; ask your health care provider or pharmacist if you have questions.  COMMON BRAND NAME(S): Adiliacatherineil  What should I tell my health care provider before I take this medicine?  They need to know if you have any of these conditions:  -blood disorders  -dihydropyrimidine dehydrogenase (DPD) deficiency  -infection (especially a virus infection such as chickenpox, cold sores, or herpes)  -kidney disease  -liver disease  -malnourished, poor nutrition  -recent or ongoing radiation therapy  -an unusual or allergic reaction to fluorouracil, other chemotherapy, other medicines, foods, dyes, or preservatives  -pregnant or trying to get pregnant  -breast-feeding  How should I use this medicine?  This drug is given as an infusion or injection into a vein. It is administered in a hospital or clinic by a specially trained health care professional.  Talk to your pediatrician regarding the use of this medicine in children. Special care may be needed.  Overdosage: If you think you have taken too much of this medicine contact a poison control center or emergency room at once.  NOTE: This medicine is only for you. Do not share this medicine with others.  What if I miss a dose?  It is important not to miss your dose. Call your doctor or health care professional if you are unable to keep an appointment.  What  may interact with this medicine?  -allopurinol  -cimetidine  -dapsone  -digoxin  -hydroxyurea  -leucovorin  -levamisole  -medicines for seizures like ethotoin, fosphenytoin, phenytoin  -medicines to increase blood counts like filgrastim, pegfilgrastim, sargramostim  -medicines that treat or prevent blood clots like warfarin, enoxaparin, and dalteparin  -methotrexate  -metronidazole  -pyrimethamine  -some other chemotherapy drugs like busulfan, cisplatin, estramustine, vinblastine  -trimethoprim  -trimetrexate  -vaccines  Talk to your doctor or health care professional before taking any of these medicines:  -acetaminophen  -aspirin  -ibuprofen  -ketoprofen  -naproxen  This list may not describe all possible interactions. Give your health care provider a list of all the medicines, herbs, non-prescription drugs, or dietary supplements you use. Also tell them if you smoke, drink alcohol, or use illegal drugs. Some items may interact with your medicine.  What should I watch for while using this medicine?  Visit your doctor for checks on your progress. This drug may make you feel generally unwell. This is not uncommon, as chemotherapy can affect healthy cells as well as cancer cells. Report any side effects. Continue your course of treatment even though you feel ill unless your doctor tells you to stop.  In some cases, you may be given additional medicines to help with side effects. Follow all directions for their use.  Call your doctor or health care professional for advice if you get a fever, chills or sore throat, or other symptoms of a cold or flu. Do not treat yourself. This drug decreases your body's ability to fight infections. Try to avoid being around people who are sick.  This medicine may increase your risk to bruise or bleed. Call your doctor or health care professional if you notice any unusual bleeding.  Be careful brushing and flossing your teeth or using a toothpick because you may get an infection or  bleed more easily. If you have any dental work done, tell your dentist you are receiving this medicine.  Avoid taking products that contain aspirin, acetaminophen, ibuprofen, naproxen, or ketoprofen unless instructed by your doctor. These medicines may hide a fever.  Do not become pregnant while taking this medicine. Women should inform their doctor if they wish to become pregnant or think they might be pregnant. There is a potential for serious side effects to an unborn child. Talk to your health care professional or pharmacist for more information. Do not breast-feed an infant while taking this medicine.  Men should inform their doctor if they wish to father a child. This medicine may lower sperm counts.  Do not treat diarrhea with over the counter products. Contact your doctor if you have diarrhea that lasts more than 2 days or if it is severe and watery.  This medicine can make you more sensitive to the sun. Keep out of the sun. If you cannot avoid being in the sun, wear protective clothing and use sunscreen. Do not use sun lamps or tanning beds/booths.  What side effects may I notice from receiving this medicine?  Side effects that you should report to your doctor or health care professional as soon as possible:  -allergic reactions like skin rash, itching or hives, swelling of the face, lips, or tongue  -low blood counts - this medicine may decrease the number of white blood cells, red blood cells and platelets. You may be at increased risk for infections and bleeding.  -signs of infection - fever or chills, cough, sore throat, pain or difficulty passing urine  -signs of decreased platelets or bleeding - bruising, pinpoint red spots on the skin, black, tarry stools, blood in the urine  -signs of decreased red blood cells - unusually weak or tired, fainting spells, lightheadedness  -breathing problems  -changes in vision  -chest pain  -mouth sores  -nausea and vomiting  -pain, swelling, redness at site where  injected  -pain, tingling, numbness in the hands or feet  -redness, swelling, or sores on hands or feet  -stomach pain  -unusual bleeding  Side effects that usually do not require medical attention (report to your doctor or health care professional if they continue or are bothersome):  -changes in finger or toe nails  -diarrhea  -dry or itchy skin  -hair loss  -headache  -loss of appetite  -sensitivity of eyes to the light  -stomach upset  -unusually teary eyes  This list may not describe all possible side effects. Call your doctor for medical advice about side effects. You may report side effects to FDA at 4-182-PYE-9870.  Where should I keep my medicine?  This drug is given in a hospital or clinic and will not be stored at home.  NOTE: This sheet is a summary. It may not cover all possible information. If you have questions about this medicine, talk to your doctor, pharmacist, or health care provider.  © 2018 [employer] (2009-04-22 13:53:16)

## 2018-11-19 ENCOUNTER — HOSPITAL ENCOUNTER (OUTPATIENT)
Dept: CT IMAGING | Facility: HOSPITAL | Age: 73
Discharge: HOME | End: 2018-11-19
Payer: MEDICARE

## 2018-11-19 ENCOUNTER — TELEPHONE (OUTPATIENT)
Dept: SURGERY | Facility: CLINIC | Age: 73
End: 2018-11-19

## 2018-11-19 DIAGNOSIS — C18.2 MALIGNANT NEOPLASM OF ASCENDING COLON (CMS/HCC): Primary | ICD-10-CM

## 2018-11-19 DIAGNOSIS — C18.2 MALIGNANT NEOPLASM OF ASCENDING COLON (CMS/HCC): ICD-10-CM

## 2018-11-19 PROCEDURE — 71260 CT THORAX DX C+: CPT

## 2018-11-19 PROCEDURE — 71260 CT THORAX DX C+: CPT | Mod: 26 | Performed by: RADIOLOGY

## 2018-11-19 PROCEDURE — 2550000100 HC RX 255: Performed by: INTERNAL MEDICINE

## 2018-11-19 RX ORDER — IOPAMIDOL 755 MG/ML
80 INJECTION, SOLUTION INTRAVASCULAR ONCE
Status: COMPLETED | OUTPATIENT
Start: 2018-11-19 | End: 2018-11-19

## 2018-11-19 RX ADMIN — IOPAMIDOL 80 ML: 755 INJECTION, SOLUTION INTRAVENOUS at 09:00

## 2018-11-19 NOTE — PROGRESS NOTES
Medical Oncology Consult    Date of Service: 11/16/2018  Requesting Physician: Tremaine Rodriguez MD    Reason for consult:  Recently diagnosed colon cancer.  Evaluation for systemic adjuvant chemotherapy.    Subjective   HISTORY OF PRESENT ILLNESS:  This is a 73-year-old female who has had iron deficiency for approximately 2 years.  Approximately 3 months ago she felt a lump in the right lower quadrant associated with indigestion.  She lost approximately 5 pounds in the same timeframe.  With these symptoms, she was seen and underwent a CT scan which showed presence of tumor involving ascending colon.  Subsequently she underwent a colonoscopy which confirmed the presence of colon cancer and she underwent surgical resection on October 25, 2018.  Surgery went well without any complications so far.      REVIEW OF SYSTEMS: A 14 point systems review was done.  It was negative with the exception of lump in the right lower quadrant, indigestion, black stools for 2 years attributed to iron intake and weight loss.  Since surgery her symptoms have resolved.      PAST MEDICAL HISTORY: Past medical history is remarkable for hypertension, hyperlipidemia, type 2 diabetes.  No prior history of malignancies      PAST SURGICAL HISTORY: Past surgical history is remarkable for hysterectomy for fibroid tumors and cholecystectomy for gallstones in addition to recent colon cancer surgery.      MEDICATIONS: I reviewed the patient's medications      ALLERGIES: No known drug allergies      SOCIAL HISTORY: The patient is .  She has 2 children.  She lives in [facility].  She is a retired .  She has never smoked but her parents did.  Her alcohol consumption consists of one glass of wine each night.      FAMILY HISTORY: Unremarkable including colorectal cancer          The ECOG performance status today is .0 - Asymptomatic          Objective      Prior to Admission medications    Medication Sig Start Date End  "Date Taking? Authorizing Provider   acetaminophen (TYLENOL) 500 mg tablet Take 2 tablets (1,000 mg total) by mouth every 8 (eight) hours as needed for pain scale 1-3/10. 10/27/18  Yes Deepak Moralez MD   atorvastatin (LIPITOR) 20 mg tablet TAKE 1 TABLET BY MOUTH EVERY DAY 8/6/18  Yes Christina Singh CNP   blood-glucose meter (ACCU-CHEK JOSE MANUEL) misc FPD 5/15/14  Yes Conversion Provider   cholecalciferol, vitamin D3, (cholecalciferol) 1,000 unit tablet Take 2,000 Units by mouth daily.   Yes Historical Provider, MD   ferrous sulfate (SLOW FE) 142 mg (45 mg iron) tablet extended release Take 1 tablet by mouth daily.   Yes Historical Provider, MD   lancets (ACCU-CHEK FASTCLIX) [facility] USE BID TO TEST BLOOD SUGAR LEVELS 8/7/17  Yes Conversion Provider   lancing device with lancets (ACCU-CHEK FASTCLIX) kit Use as directed to check blood sugars twice daily. 11/20/17  Yes Christina Singh CNP   losartan (COZAAR) 50 mg tablet TAKE 1 TABLET BY MOUTH EVERY DAY 9/12/18  Yes Christina Singh CNP   metFORMIN (GLUCOPHAGE) 500 mg tablet TAKE 1 TABLET BY MOUTH TWICE DAILY WITH MEALS 8/6/18  Yes Christina Singh CNP   sennosides-docusate sodium (SENNA WITH DOCUSATE SODIUM) 8.6-50 mg Take 1 tablet by mouth 2 (two) times a day as needed for constipation. Please take 1 tab BID while on narcotic pain medication to prevent constipation 10/27/18 10/27/19 Yes Deepak Moralez MD         PHYSICAL EXAM:   /81   Pulse 91   Temp 36.7 °C (98.1 °F) (Temporal)   Ht 1.575 m (5' 2\")   Wt 49.6 kg (109 lb 6.4 oz)   SpO2 97%   BMI 20.01 kg/m²    Body mass index is 20.01 kg/m².       Patient overall looked well.  No distress.  No signs of malnutrition    HEENT: No scleral icterus.  No oral or pharyngeal lesions    Ln: No lymphadenopathy neck axilla or groin    Lungs: Clear.  No rales, rhonchi or wheezing    CVS: Regular rate and rhythm.  No S3.  No friction rub    Abdomen: Benign nontender.  No hepatosplenomegaly.  No palpable abnormalities  Recent " multiple small laparoscopic incisions were well-healed without signs of infection    Ext: No edema.  Pulses intact    Musculoskeletal: No muscle or bone tenderness including spine pelvis    Skin: No petechia purpura rash    Neuro: Alert oriented ×3.  No focal deficits.        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  Surgical margins were negative.  Tumor extended through pericolonic tissues.  There were 19 lymph nodes removed 1 of which contain metastatic cancer.  Pathologically it was a T3 N1 a and M0 colon cancer.           Assessment/Plan    IMPRESSION and PLAN:  This is a 73-year-old female with recent diagnosis of colon cancer.  The patient comes to medical oncology clinic for initial evaluation.    The patient appears to have undergone a successful colon cancer surgery for her cancer located in ascending colon.  So far she has not developed any complications.  The obvious question is whether or not she needs adjuvant systemic chemotherapy.  The patient has some negative prognostic factors including the site of the tumor being in the right colon, poorly differentiated histology and the presence of one metastatic lymph node.  She has pathologically and clinically stage IIIb colon cancer with substantial risk of recurrence and mortality over 50% in the next 10 years.  In order to reduce this risk, my recommendation would be standard adjuvant chemotherapy with FOLFOX for a period of 6 months.  The patient has good performance status.  Additionally she does not have any organ dysfunction in spite of her hypertension and diabetes.  Therefore she appears to be a good candidate for adjuvant systemic therapy to reduce her risk.    I had a very long conversation with the patient.  I reviewed her pathology report and imaging studies in detail.  We talked about the status  of her cancer, her prognosis and risks of proposed chemotherapy to reduce her recurrence rate which include but not limited to bone marrow toxicity, mucocutaneous toxicity, cardiotoxicity as well as neurotoxicity.  I also answered a long list of questions she had to her satisfaction.  At the end, the patient stated that she fully understood her prognosis, risks of proposed chemotherapy and she was leaning towards getting it but she wanted few days to think over her options.  Therefore once she makes her decision will proceed accordingly.    Today I spent a total of 60 minutes with this patient more than 50% of which was spent in counseling and coordination of care.             Physician: M. BEHNAN SAHIN, MD

## 2018-11-19 NOTE — H&P (VIEW-ONLY)
Medical Oncology Consult    Date of Service: 11/16/2018  Requesting Physician: Tremaine Rodriguez MD    Reason for consult:  Recently diagnosed colon cancer.  Evaluation for systemic adjuvant chemotherapy.    Subjective   HISTORY OF PRESENT ILLNESS:  This is a 73-year-old female who has had iron deficiency for approximately 2 years.  Approximately 3 months ago she felt a lump in the right lower quadrant associated with indigestion.  She lost approximately 5 pounds in the same timeframe.  With these symptoms, she was seen and underwent a CT scan which showed presence of tumor involving ascending colon.  Subsequently she underwent a colonoscopy which confirmed the presence of colon cancer and she underwent surgical resection on October 25, 2018.  Surgery went well without any complications so far.      REVIEW OF SYSTEMS: A 14 point systems review was done.  It was negative with the exception of lump in the right lower quadrant, indigestion, black stools for 2 years attributed to iron intake and weight loss.  Since surgery her symptoms have resolved.      PAST MEDICAL HISTORY: Past medical history is remarkable for hypertension, hyperlipidemia, type 2 diabetes.  No prior history of malignancies      PAST SURGICAL HISTORY: Past surgical history is remarkable for hysterectomy for fibroid tumors and cholecystectomy for gallstones in addition to recent colon cancer surgery.      MEDICATIONS: I reviewed the patient's medications      ALLERGIES: No known drug allergies      SOCIAL HISTORY: The patient is .  She has 2 children.  She lives in [facility].  She is a retired .  She has never smoked but her parents did.  Her alcohol consumption consists of one glass of wine each night.      FAMILY HISTORY: Unremarkable including colorectal cancer          The ECOG performance status today is .0 - Asymptomatic          Objective      Prior to Admission medications    Medication Sig Start Date End  "Date Taking? Authorizing Provider   acetaminophen (TYLENOL) 500 mg tablet Take 2 tablets (1,000 mg total) by mouth every 8 (eight) hours as needed for pain scale 1-3/10. 10/27/18  Yes Deepak Moralez MD   atorvastatin (LIPITOR) 20 mg tablet TAKE 1 TABLET BY MOUTH EVERY DAY 8/6/18  Yes Christina Singh CNP   blood-glucose meter (ACCU-CHEK JOSE MANUEL) misc FPD 5/15/14  Yes Conversion Provider   cholecalciferol, vitamin D3, (cholecalciferol) 1,000 unit tablet Take 2,000 Units by mouth daily.   Yes Historical Provider, MD   ferrous sulfate (SLOW FE) 142 mg (45 mg iron) tablet extended release Take 1 tablet by mouth daily.   Yes Historical Provider, MD   lancets (ACCU-CHEK FASTCLIX) [facility] [place]ID TO TEST BLOOD SUGAR LEVELS 8/7/17  Yes Conversion Provider   lancing device with lancets (ACCU-CHEK FASTCLIX) kit Use as directed to check blood sugars twice daily. 11/20/17  Yes Christina Singh CNP   losartan (COZAAR) 50 mg tablet TAKE 1 TABLET BY MOUTH EVERY DAY 9/12/18  Yes Christina Singh CNP   metFORMIN (GLUCOPHAGE) 500 mg tablet TAKE 1 TABLET BY MOUTH TWICE DAILY WITH MEALS 8/6/18  Yes Christina Singh CNP   sennosides-docusate sodium (SENNA WITH DOCUSATE SODIUM) 8.6-50 mg Take 1 tablet by mouth 2 (two) times a day as needed for constipation. Please take 1 tab BID while on narcotic pain medication to prevent constipation 10/27/18 10/27/19 Yes Deepak Moralez MD         PHYSICAL EXAM:   /81   Pulse 91   Temp 36.7 °C (98.1 °F) (Temporal)   Ht 1.575 m (5' 2\")   Wt 49.6 kg (109 lb 6.4 oz)   SpO2 97%   BMI 20.01 kg/m²    Body mass index is 20.01 kg/m².       Patient overall looked well.  No distress.  No signs of malnutrition    HEENT: No scleral icterus.  No oral or pharyngeal lesions    Ln: No lymphadenopathy neck axilla or groin    Lungs: Clear.  No rales, rhonchi or wheezing    CVS: Regular rate and rhythm.  No S3.  No friction rub    Abdomen: Benign nontender.  No hepatosplenomegaly.  No palpable abnormalities  Recent " multiple small laparoscopic incisions were well-healed without signs of infection    Ext: No edema.  Pulses intact    Musculoskeletal: No muscle or bone tenderness including spine pelvis    Skin: No petechia purpura rash    Neuro: Alert oriented ×3.  No focal deficits.        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  Surgical margins were negative.  Tumor extended through pericolonic tissues.  There were 19 lymph nodes removed 1 of which contain metastatic cancer.  Pathologically it was a T3 N1 a and M0 colon cancer.           Assessment/Plan    IMPRESSION and PLAN:  This is a 73-year-old female with recent diagnosis of colon cancer.  The patient comes to medical oncology clinic for initial evaluation.    The patient appears to have undergone a successful colon cancer surgery for her cancer located in ascending colon.  So far she has not developed any complications.  The obvious question is whether or not she needs adjuvant systemic chemotherapy.  The patient has some negative prognostic factors including the site of the tumor being in the right colon, poorly differentiated histology and the presence of one metastatic lymph node.  She has pathologically and clinically stage IIIb colon cancer with substantial risk of recurrence and mortality over 50% in the next 10 years.  In order to reduce this risk, my recommendation would be standard adjuvant chemotherapy with FOLFOX for a period of 6 months.  The patient has good performance status.  Additionally she does not have any organ dysfunction in spite of her hypertension and diabetes.  Therefore she appears to be a good candidate for adjuvant systemic therapy to reduce her risk.    I had a very long conversation with the patient.  I reviewed her pathology report and imaging studies in detail.  We talked about the status  of her cancer, her prognosis and risks of proposed chemotherapy to reduce her recurrence rate which include but not limited to bone marrow toxicity, mucocutaneous toxicity, cardiotoxicity as well as neurotoxicity.  I also answered a long list of questions she had to her satisfaction.  At the end, the patient stated that she fully understood her prognosis, risks of proposed chemotherapy and she was leaning towards getting it but she wanted few days to think over her options.  Therefore once she makes her decision will proceed accordingly.    Today I spent a total of 60 minutes with this patient more than 50% of which was spent in counseling and coordination of care.             Physician: M. BEHNAN SAHIN, MD

## 2018-11-19 NOTE — TELEPHONE ENCOUNTER
Patient scheduled for port placement 11/30/18.  Chemo is starting Dec 3rd.  Information pack mailed to patient.

## 2018-11-21 ASSESSMENT — ACTIVITIES OF DAILY LIVING (ADL): ADEQUATE_TO_COMPLETE_ADL: YES

## 2018-11-21 NOTE — PRE-PROCEDURE INSTRUCTIONS
Pre-Surgery Instructions:   Medication Instructions   • acetaminophen (TYLENOL) 500 mg tablet Do not take morning of surgery/procedure   • atorvastatin (LIPITOR) 20 mg tablet Do not take morning of surgery/procedure   • blood-glucose meter (ACCU-CHEK JOSE MANUEL) misc PRN morning of surgery/procedure   • cholecalciferol, vitamin D3, (cholecalciferol) 1,000 unit tablet Do not take morning of surgery/procedure   • ferrous sulfate (SLOW FE) 142 mg (45 mg iron) tablet extended release Do not take morning of surgery/procedure   • lancets (ACCU-CHEK FASTCLIX) misc PRN morning of surgery/procedure   • lancing device with lancets (ACCU-CHEK FASTCLIX) kit PRN morning of surgery/procedure   • losartan (COZAAR) 50 mg tablet Take morning of surgery/procedure   • metFORMIN (GLUCOPHAGE) 500 mg tablet Do not take morning of surgery/procedure   • sennosides-docusate sodium (SENNA WITH DOCUSATE SODIUM) 8.6-50 mg Do not take morning of surgery/procedure   PRE-OP DONE.  INSTRUCTED TO BE AT THE SURGERY CENTER AT 0730 DOS.  NOTHING TO EAT OR DRINK AFTER 0130 DOS.  REVIEWED AND DISCUSSED MEDS, TAKE BP MED WITH SIP OF WATER 2 HOURS PRIOR TO ARRIVAL.  ENCOURAGED TO HYDRATE WELL FOR THE 2 DAYS PRIOR TO DOS.  DISCUSSED SHOWERING AND NO SKIN PRODUCTS DOS.  WILL BE WITH DOS.  VERBALIZES UNDERSTANDING OF INSTRUCTIONS.

## 2018-11-27 ENCOUNTER — ANESTHESIA EVENT (OUTPATIENT)
Dept: GASTROENTEROLOGY | Facility: HOSPITAL | Age: 73
End: 2018-11-27
Payer: MEDICARE

## 2018-11-30 ENCOUNTER — ANCILLARY PROCEDURE (OUTPATIENT)
Dept: RADIOLOGY | Facility: HOSPITAL | Age: 73
End: 2018-11-30
Attending: SURGERY
Payer: MEDICARE

## 2018-11-30 ENCOUNTER — ANESTHESIA (OUTPATIENT)
Dept: GASTROENTEROLOGY | Facility: HOSPITAL | Age: 73
End: 2018-11-30
Payer: MEDICARE

## 2018-11-30 ENCOUNTER — HOSPITAL ENCOUNTER (OUTPATIENT)
Facility: HOSPITAL | Age: 73
Setting detail: OUTPATIENT SURGERY
Discharge: HOME | End: 2018-11-30
Attending: SURGERY | Admitting: SURGERY
Payer: MEDICARE

## 2018-11-30 VITALS
DIASTOLIC BLOOD PRESSURE: 64 MMHG | RESPIRATION RATE: 16 BRPM | OXYGEN SATURATION: 96 % | SYSTOLIC BLOOD PRESSURE: 139 MMHG | BODY MASS INDEX: 20.06 KG/M2 | TEMPERATURE: 97.8 F | HEART RATE: 79 BPM | HEIGHT: 62 IN | WEIGHT: 109 LBS

## 2018-11-30 DIAGNOSIS — C18.2 MALIGNANT NEOPLASM OF ASCENDING COLON (CMS/HCC): Primary | ICD-10-CM

## 2018-11-30 LAB — GLUCOSE BLDC GLUCOMTR-MCNC: 114 MG/DL (ref 70–105)

## 2018-11-30 PROCEDURE — (BLANK) HC RECOVERY PHASE-2 1ST 1/2 HOUR ACUITY LEVEL 2: Performed by: SURGERY

## 2018-11-30 PROCEDURE — 6360000200 HC RX 636 W HCPCS (ALT 250 FOR IP): Mod: JW | Performed by: SURGERY

## 2018-11-30 PROCEDURE — 6360000200 HC RX 636 W HCPCS (ALT 250 FOR IP): Performed by: NURSE ANESTHETIST, CERTIFIED REGISTERED

## 2018-11-30 PROCEDURE — 00532 ANES ACCESS CTR VENOUS CRCJ: CPT | Mod: G8 | Performed by: NURSE ANESTHETIST, CERTIFIED REGISTERED

## 2018-11-30 PROCEDURE — 2500000200 HC RX 250 WO HCPCS: Performed by: SURGERY

## 2018-11-30 PROCEDURE — 99100 ANES PT EXTEME AGE<1 YR&>70: CPT | Performed by: NURSE ANESTHETIST, CERTIFIED REGISTERED

## 2018-11-30 PROCEDURE — C1788 PORT, INDWELLING, IMP: HCPCS | Performed by: SURGERY

## 2018-11-30 PROCEDURE — 6360000200 HC RX 636 W HCPCS (ALT 250 FOR IP): Performed by: SURGERY

## 2018-11-30 PROCEDURE — (BLANK) HC OR LEVEL 2 PROC EACH ADDITIONAL MIN: Performed by: SURGERY

## 2018-11-30 PROCEDURE — 2580000300 HC RX 258: Performed by: SURGERY

## 2018-11-30 PROCEDURE — 76000 FLUOROSCOPY <1 HR PHYS/QHP: CPT | Mod: NC

## 2018-11-30 PROCEDURE — 36561 INSERT TUNNELED CV CATH: CPT | Mod: 78 | Performed by: SURGERY

## 2018-11-30 PROCEDURE — (BLANK) HC OR LEVEL 2 PROC 1ST 15MIN: Performed by: SURGERY

## 2018-11-30 PROCEDURE — 82962 GLUCOSE BLOOD TEST: CPT

## 2018-11-30 DEVICE — DEVICE POWER PORT MRI 8F LOW P CLEARVUE ISP INTERMEDIATE: Type: IMPLANTABLE DEVICE | Site: SUBCLAVIAN | Status: FUNCTIONAL

## 2018-11-30 RX ORDER — HEPARIN 100 UNIT/ML
5 SYRINGE INTRAVENOUS AS NEEDED
Qty: 10 ML | Refills: 11 | Status: SHIPPED | OUTPATIENT
Start: 2018-11-30 | End: 2020-05-20 | Stop reason: ALTCHOICE

## 2018-11-30 RX ORDER — MIDAZOLAM HYDROCHLORIDE 1 MG/ML
INJECTION INTRAMUSCULAR; INTRAVENOUS AS NEEDED
Status: DISCONTINUED | OUTPATIENT
Start: 2018-11-30 | End: 2018-11-30 | Stop reason: SURG

## 2018-11-30 RX ORDER — LIDOCAINE AND PRILOCAINE 25; 25 MG/G; MG/G
CREAM TOPICAL
Qty: 30 G | Refills: 11 | Status: SHIPPED | OUTPATIENT
Start: 2018-11-30 | End: 2020-05-20 | Stop reason: ALTCHOICE

## 2018-11-30 RX ORDER — HEPARIN SODIUM,PORCINE/PF 10 UNIT/ML
SYRINGE (ML) INTRAVENOUS AS NEEDED
Status: DISCONTINUED | OUTPATIENT
Start: 2018-11-30 | End: 2018-11-30 | Stop reason: HOSPADM

## 2018-11-30 RX ORDER — PROPOFOL 10 MG/ML
INJECTION, EMULSION INTRAVENOUS AS NEEDED
Status: DISCONTINUED | OUTPATIENT
Start: 2018-11-30 | End: 2018-11-30 | Stop reason: SURG

## 2018-11-30 RX ORDER — ONDANSETRON HYDROCHLORIDE 2 MG/ML
INJECTION, SOLUTION INTRAVENOUS AS NEEDED
Status: DISCONTINUED | OUTPATIENT
Start: 2018-11-30 | End: 2018-11-30 | Stop reason: SURG

## 2018-11-30 RX ORDER — DEXAMETHASONE 4 MG/1
8 TABLET ORAL
Qty: 8 TABLET | Refills: 5 | Status: SHIPPED | OUTPATIENT
Start: 2018-11-30 | End: 2018-12-02

## 2018-11-30 RX ORDER — SODIUM CHLORIDE 0.9 % (FLUSH) 0.9 %
10 SYRINGE (ML) INJECTION AS NEEDED
Qty: 20 ML | Refills: 11 | Status: SHIPPED | OUTPATIENT
Start: 2018-11-30 | End: 2020-05-20 | Stop reason: ALTCHOICE

## 2018-11-30 RX ORDER — LIDOCAINE HYDROCHLORIDE AND EPINEPHRINE 10; 10 UG/ML; MG/ML
INJECTION, SOLUTION INFILTRATION; PERINEURAL AS NEEDED
Status: DISCONTINUED | OUTPATIENT
Start: 2018-11-30 | End: 2018-11-30 | Stop reason: HOSPADM

## 2018-11-30 RX ORDER — LIDOCAINE AND PRILOCAINE 25; 25 MG/G; MG/G
1 CREAM TOPICAL AS NEEDED
Qty: 30 G | Refills: 2 | Status: SHIPPED | OUTPATIENT
Start: 2018-11-30 | End: 2019-11-30

## 2018-11-30 RX ORDER — LIDOCAINE HYDROCHLORIDE 20 MG/ML
INJECTION, SOLUTION EPIDURAL; INFILTRATION; INTRACAUDAL; PERINEURAL AS NEEDED
Status: DISCONTINUED | OUTPATIENT
Start: 2018-11-30 | End: 2018-11-30 | Stop reason: SURG

## 2018-11-30 RX ORDER — PROCHLORPERAZINE MALEATE 10 MG
10 TABLET ORAL 2 TIMES DAILY
Qty: 50 TABLET | Refills: 3 | Status: SHIPPED | OUTPATIENT
Start: 2018-11-30 | End: 2018-12-03

## 2018-11-30 RX ORDER — SODIUM CHLORIDE, SODIUM LACTATE, POTASSIUM CHLORIDE, CALCIUM CHLORIDE 600; 310; 30; 20 MG/100ML; MG/100ML; MG/100ML; MG/100ML
125 INJECTION, SOLUTION INTRAVENOUS CONTINUOUS
Status: DISCONTINUED | OUTPATIENT
Start: 2018-11-30 | End: 2018-11-30 | Stop reason: HOSPADM

## 2018-11-30 RX ORDER — SODIUM CHLORIDE 0.9 G/100ML
INJECTION, SOLUTION IRRIGATION AS NEEDED
Status: DISCONTINUED | OUTPATIENT
Start: 2018-11-30 | End: 2018-11-30 | Stop reason: HOSPADM

## 2018-11-30 RX ORDER — BUPIVACAINE HYDROCHLORIDE 2.5 MG/ML
INJECTION, SOLUTION EPIDURAL; INFILTRATION; INTRACAUDAL AS NEEDED
Status: DISCONTINUED | OUTPATIENT
Start: 2018-11-30 | End: 2018-11-30 | Stop reason: HOSPADM

## 2018-11-30 RX ADMIN — ONDANSETRON 4 MG: 2 INJECTION INTRAMUSCULAR; INTRAVENOUS at 08:13

## 2018-11-30 RX ADMIN — CEFAZOLIN SODIUM 1000 MG: 1 INJECTION, POWDER, FOR SOLUTION INTRAMUSCULAR; INTRAVENOUS at 08:19

## 2018-11-30 RX ADMIN — SODIUM CHLORIDE, POTASSIUM CHLORIDE, SODIUM LACTATE AND CALCIUM CHLORIDE 125 ML/HR: 600; 310; 30; 20 INJECTION, SOLUTION INTRAVENOUS at 07:51

## 2018-11-30 RX ADMIN — LIDOCAINE HYDROCHLORIDE 40 MG: 20 INJECTION, SOLUTION EPIDURAL; INFILTRATION; INTRACAUDAL; PERINEURAL at 08:20

## 2018-11-30 RX ADMIN — PROPOFOL 20 MG: 10 INJECTION, EMULSION INTRAVENOUS at 08:28

## 2018-11-30 RX ADMIN — PROPOFOL 20 MG: 10 INJECTION, EMULSION INTRAVENOUS at 08:24

## 2018-11-30 RX ADMIN — PROPOFOL 30 MG: 10 INJECTION, EMULSION INTRAVENOUS at 08:38

## 2018-11-30 RX ADMIN — PROPOFOL 20 MG: 10 INJECTION, EMULSION INTRAVENOUS at 08:32

## 2018-11-30 RX ADMIN — MIDAZOLAM HYDROCHLORIDE 2 MG: 1 INJECTION, SOLUTION INTRAMUSCULAR; INTRAVENOUS at 08:17

## 2018-11-30 RX ADMIN — PROPOFOL 20 MG: 10 INJECTION, EMULSION INTRAVENOUS at 08:20

## 2018-11-30 RX ADMIN — PROPOFOL 20 MG: 10 INJECTION, EMULSION INTRAVENOUS at 08:40

## 2018-11-30 RX ADMIN — PROPOFOL 20 MG: 10 INJECTION, EMULSION INTRAVENOUS at 08:35

## 2018-11-30 RX ADMIN — PROPOFOL 30 MG: 10 INJECTION, EMULSION INTRAVENOUS at 08:36

## 2018-11-30 ASSESSMENT — ENCOUNTER SYMPTOMS
EXERCISE TOLERANCE: GOOD (4-7 METS)
SHORTNESS OF BREATH: 1

## 2018-11-30 NOTE — OP NOTE
Operative Note    Shama Caballero  11/30/18    PREOPERATIVE DIAGNOSIS:  Pre-op Diagnosis     * Malignant neoplasm of ascending colon (CMS/HCC) (HCC) [C18.2]    POSTOPERATIVE DIAGNOSIS:  SAME    PROCEDURES:    Procedures:    * PORTACATH INSERTION    Right internal jugular under ultrasound guidance with fluoroscopic confirmation  SURGEON: Surgeon(s):  Tremaine Rodriguez MD    ANESTHESIA TYPE:  Monitor Anesthesia Care      No blood loss documented.    SPECIMENS: No specimens collected during this procedure.    COMPLICATIONS:  None    INDICATIONS: Stage III colon cancer    FINDINGS: Normal anatomy    DESCRIPTION OF PROCEDURE: After informed consent was obtained patient was brought to the operating room suite placed supine upon the operating table.  After smooth induction of monitored anesthesia care the patient was prepped and draped in normal sterile surgical fashion.  Preoperative pause was undertaken confirming patient, site, procedure, antibiotics, allergies    Right internal jugular was interrogated with ultrasound.  This was widely patent with normal anatomy.  Area was then anesthetized with 1% lidocaine.  18-gauge Cook needle was then passed directly into the internal jugular under ultrasound guidance.  There was immediate return of dark nonpulsatile blood.  Guidewire was easily placed.  Fluoroscopy was brought in and this was confirmed in the superior vena cava.  Access needle was removed.  Attention was turned to the right subcu clavicular fossa.  Local anesthetic was used to anesthetize the area.  A 4 cm incision made carried down onto the clavipectoral fascia.  Pocket was created here.  A catheter/tunneler was then brought up to the field.  The stick site was incised with #11 blade and catheter tunneled up to the stick site.  This was position 13 cm at the stick site.  Catheter was trimmed hooked to the port itself and port was placed within the pocket.  Dilator introducer was then brought up in a Seldinger  technique this was placed in the superior vena cava.  Guidewire and dilator were removed.  Split sheath introducer was then used to slide the catheter into the superior vena cava.  Split sheath was removed.  Fluoroscopy was brought back up to the field.  The catheter was seen in good position in the superior vena cava with nice gentle curve to it at the stick site.  No kinking or other issues.  Port was then accessed it lisa and flushed easily.  It was heparinized with 3 mL's of heparinized saline.  Port was then sewn in the pocket at the 2:00 and 10 o'clock position with interrupted 2-0 Prolene.  Deep subcutaneous tissues were closed over the port with running 3-0 Vicryl.  4-0 subcuticular Vicryl was used to close the skin at the port site and the stick site.  Sterile dressings were applied.  Patient tolerated procedure well without apparent complications      Tremaine Rodriguez MD  Phone Number: [phone]    DATE: 11/30/18      TIME: 9:06 AM    TREMAINE RODRIGUEZ MD

## 2018-11-30 NOTE — PATIENT INSTRUCTIONS
Patient Education   Oxaliplatin Injection  What is this medicine?  OXALIPLATIN (ox AL i GLORIA tin) is a chemotherapy drug. It targets fast dividing cells, like cancer cells, and causes these cells to die. This medicine is used to treat cancers of the colon and rectum, and many other cancers.  This medicine may be used for other purposes; ask your health care provider or pharmacist if you have questions.  COMMON BRAND NAME(S): Eloxatin  What should I tell my health care provider before I take this medicine?  They need to know if you have any of these conditions:  -kidney disease  -an unusual or allergic reaction to oxaliplatin, other chemotherapy, other medicines, foods, dyes, or preservatives  -pregnant or trying to get pregnant  -breast-feeding  How should I use this medicine?  This drug is given as an infusion into a vein. It is administered in a hospital or clinic by a specially trained health care professional.  Talk to your pediatrician regarding the use of this medicine in children. Special care may be needed.  Overdosage: If you think you have taken too much of this medicine contact a poison control center or emergency room at once.  NOTE: This medicine is only for you. Do not share this medicine with others.  What if I miss a dose?  It is important not to miss a dose. Call your doctor or health care professional if you are unable to keep an appointment.  What may interact with this medicine?  -medicines to increase blood counts like filgrastim, pegfilgrastim, sargramostim  -probenecid  -some antibiotics like amikacin, gentamicin, neomycin, polymyxin B, streptomycin, tobramycin  -zalcitabine  Talk to your doctor or health care professional before taking any of these medicines:  -acetaminophen  -aspirin  -ibuprofen  -ketoprofen  -naproxen  This list may not describe all possible interactions. Give your health care provider a list of all the medicines, herbs, non-prescription drugs, or dietary supplements you  use. Also tell them if you smoke, drink alcohol, or use illegal drugs. Some items may interact with your medicine.  What should I watch for while using this medicine?  Your condition will be monitored carefully while you are receiving this medicine. You will need important blood work done while you are taking this medicine.  This medicine can make you more sensitive to cold. Do not drink cold drinks or use ice. Cover exposed skin before coming in contact with cold temperatures or cold objects. When out in cold weather wear warm clothing and cover your mouth and nose to warm the air that goes into your lungs. Tell your doctor if you get sensitive to the cold.  This drug may make you feel generally unwell. This is not uncommon, as chemotherapy can affect healthy cells as well as cancer cells. Report any side effects. Continue your course of treatment even though you feel ill unless your doctor tells you to stop.  In some cases, you may be given additional medicines to help with side effects. Follow all directions for their use.  Call your doctor or health care professional for advice if you get a fever, chills or sore throat, or other symptoms of a cold or flu. Do not treat yourself. This drug decreases your body's ability to fight infections. Try to avoid being around people who are sick.  This medicine may increase your risk to bruise or bleed. Call your doctor or health care professional if you notice any unusual bleeding.  Be careful brushing and flossing your teeth or using a toothpick because you may get an infection or bleed more easily. If you have any dental work done, tell your dentist you are receiving this medicine.  Avoid taking products that contain aspirin, acetaminophen, ibuprofen, naproxen, or ketoprofen unless instructed by your doctor. These medicines may hide a fever.  Do not become pregnant while taking this medicine. Women should inform their doctor if they wish to become pregnant or think they  might be pregnant. There is a potential for serious side effects to an unborn child. Talk to your health care professional or pharmacist for more information. Do not breast-feed an infant while taking this medicine.  Call your doctor or health care professional if you get diarrhea. Do not treat yourself.  What side effects may I notice from receiving this medicine?  Side effects that you should report to your doctor or health care professional as soon as possible:  -allergic reactions like skin rash, itching or hives, swelling of the face, lips, or tongue  -low blood counts - This drug may decrease the number of white blood cells, red blood cells and platelets. You may be at increased risk for infections and bleeding.  -signs of infection - fever or chills, cough, sore throat, pain or difficulty passing urine  -signs of decreased platelets or bleeding - bruising, pinpoint red spots on the skin, black, tarry stools, nosebleeds  -signs of decreased red blood cells - unusually weak or tired, fainting spells, lightheadedness  -breathing problems  -chest pain, pressure  -cough  -diarrhea  -jaw tightness  -mouth sores  -nausea and vomiting  -pain, swelling, redness or irritation at the injection site  -pain, tingling, numbness in the hands or feet  -problems with balance, talking, walking  -redness, blistering, peeling or loosening of the skin, including inside the mouth  -trouble passing urine or change in the amount of urine  Side effects that usually do not require medical attention (report to your doctor or health care professional if they continue or are bothersome):  -changes in vision  -constipation  -hair loss  -loss of appetite  -metallic taste in the mouth or changes in taste  -stomach pain  This list may not describe all possible side effects. Call your doctor for medical advice about side effects. You may report side effects to FDA at 9-397-FDA-0456.  Where should I keep my medicine?  This drug is given in a  hospital or clinic and will not be stored at home.  NOTE: This sheet is a summary. It may not cover all possible information. If you have questions about this medicine, talk to your doctor, pharmacist, or health care provider.  © 2018 [employer]/Gold Standard (2009-07-14 17:22:47)       Patient Education   Leucovorin injection  What is this medicine?  LEUCOVORIN (loo koe VOR in) is used to prevent or treat the harmful effects of some medicines. This medicine is used to treat anemia caused by a low amount of folic acid in the body. It is also used with 5-fluorouracil (5-FU) to treat colon cancer.  This medicine may be used for other purposes; ask your health care provider or pharmacist if you have questions.  What should I tell my health care provider before I take this medicine?  They need to know if you have any of these conditions:  -anemia from low levels of vitamin B-12 in the blood  -an unusual or allergic reaction to leucovorin, folic acid, other medicines, foods, dyes, or preservatives  -pregnant or trying to get pregnant  -breast-feeding  How should I use this medicine?  This medicine is for injection into a muscle or into a vein. It is given by a health care professional in a hospital or clinic setting.  Talk to your pediatrician regarding the use of this medicine in children. Special care may be needed.  Overdosage: If you think you have taken too much of this medicine contact a poison control center or emergency room at once.  NOTE: This medicine is only for you. Do not share this medicine with others.  What if I miss a dose?  This does not apply.  What may interact with this medicine?  -capecitabine  -fluorouracil  -phenobarbital  -phenytoin  -primidone  -trimethoprim-sulfamethoxazole  This list may not describe all possible interactions. Give your health care provider a list of all the medicines, herbs, non-prescription drugs, or dietary supplements you use. Also tell them if you smoke, drink alcohol, or  use illegal drugs. Some items may interact with your medicine.  What should I watch for while using this medicine?  Your condition will be monitored carefully while you are receiving this medicine.  This medicine may increase the side effects of 5-fluorouracil, 5-FU. Tell your doctor or health care professional if you have diarrhea or mouth sores that do not get better or that get worse.  What side effects may I notice from receiving this medicine?  Side effects that you should report to your doctor or health care professional as soon as possible:  -allergic reactions like skin rash, itching or hives, swelling of the face, lips, or tongue  -breathing problems  -fever, infection  -mouth sores  -unusual bleeding or bruising  -unusually weak or tired  Side effects that usually do not require medical attention (report to your doctor or health care professional if they continue or are bothersome):  -constipation or diarrhea  -loss of appetite  -nausea, vomiting  This list may not describe all possible side effects. Call your doctor for medical advice about side effects. You may report side effects to FDA at 4-325-FDA-1726.  Where should I keep my medicine?  This drug is given in a hospital or clinic and will not be stored at home.  NOTE: This sheet is a summary. It may not cover all possible information. If you have questions about this medicine, talk to your doctor, pharmacist, or health care provider.  © 2018 [employer]vier/Gold Standard (2009-06-23 16:50:29)       Patient Education   Fluorouracil, 5-FU injection  What is this medicine?  FLUOROURACIL, 5-FU (flure oh YOOR a ramirez) is a chemotherapy drug. It slows the growth of cancer cells. This medicine is used to treat many types of cancer like breast cancer, colon or rectal cancer, pancreatic cancer, and stomach cancer.  This medicine may be used for other purposes; ask your health care provider or pharmacist if you have questions.  COMMON BRAND NAME(S): Adrucil  What should  I tell my health care provider before I take this medicine?  They need to know if you have any of these conditions:  -blood disorders  -dihydropyrimidine dehydrogenase (DPD) deficiency  -infection (especially a virus infection such as chickenpox, cold sores, or herpes)  -kidney disease  -liver disease  -malnourished, poor nutrition  -recent or ongoing radiation therapy  -an unusual or allergic reaction to fluorouracil, other chemotherapy, other medicines, foods, dyes, or preservatives  -pregnant or trying to get pregnant  -breast-feeding  How should I use this medicine?  This drug is given as an infusion or injection into a vein. It is administered in a hospital or clinic by a specially trained health care professional.  Talk to your pediatrician regarding the use of this medicine in children. Special care may be needed.  Overdosage: If you think you have taken too much of this medicine contact a poison control center or emergency room at once.  NOTE: This medicine is only for you. Do not share this medicine with others.  What if I miss a dose?  It is important not to miss your dose. Call your doctor or health care professional if you are unable to keep an appointment.  What may interact with this medicine?  -allopurinol  -cimetidine  -dapsone  -digoxin  -hydroxyurea  -leucovorin  -levamisole  -medicines for seizures like ethotoin, fosphenytoin, phenytoin  -medicines to increase blood counts like filgrastim, pegfilgrastim, sargramostim  -medicines that treat or prevent blood clots like warfarin, enoxaparin, and dalteparin  -methotrexate  -metronidazole  -pyrimethamine  -some other chemotherapy drugs like busulfan, cisplatin, estramustine, vinblastine  -trimethoprim  -trimetrexate  -vaccines  Talk to your doctor or health care professional before taking any of these medicines:  -acetaminophen  -aspirin  -ibuprofen  -ketoprofen  -naproxen  This list may not describe all possible interactions. Give your health care  provider a list of all the medicines, herbs, non-prescription drugs, or dietary supplements you use. Also tell them if you smoke, drink alcohol, or use illegal drugs. Some items may interact with your medicine.  What should I watch for while using this medicine?  Visit your doctor for checks on your progress. This drug may make you feel generally unwell. This is not uncommon, as chemotherapy can affect healthy cells as well as cancer cells. Report any side effects. Continue your course of treatment even though you feel ill unless your doctor tells you to stop.  In some cases, you may be given additional medicines to help with side effects. Follow all directions for their use.  Call your doctor or health care professional for advice if you get a fever, chills or sore throat, or other symptoms of a cold or flu. Do not treat yourself. This drug decreases your body's ability to fight infections. Try to avoid being around people who are sick.  This medicine may increase your risk to bruise or bleed. Call your doctor or health care professional if you notice any unusual bleeding.  Be careful brushing and flossing your teeth or using a toothpick because you may get an infection or bleed more easily. If you have any dental work done, tell your dentist you are receiving this medicine.  Avoid taking products that contain aspirin, acetaminophen, ibuprofen, naproxen, or ketoprofen unless instructed by your doctor. These medicines may hide a fever.  Do not become pregnant while taking this medicine. Women should inform their doctor if they wish to become pregnant or think they might be pregnant. There is a potential for serious side effects to an unborn child. Talk to your health care professional or pharmacist for more information. Do not breast-feed an infant while taking this medicine.  Men should inform their doctor if they wish to father a child. This medicine may lower sperm counts.  Do not treat diarrhea with over the  counter products. Contact your doctor if you have diarrhea that lasts more than 2 days or if it is severe and watery.  This medicine can make you more sensitive to the sun. Keep out of the sun. If you cannot avoid being in the sun, wear protective clothing and use sunscreen. Do not use sun lamps or tanning beds/booths.  What side effects may I notice from receiving this medicine?  Side effects that you should report to your doctor or health care professional as soon as possible:  -allergic reactions like skin rash, itching or hives, swelling of the face, lips, or tongue  -low blood counts - this medicine may decrease the number of white blood cells, red blood cells and platelets. You may be at increased risk for infections and bleeding.  -signs of infection - fever or chills, cough, sore throat, pain or difficulty passing urine  -signs of decreased platelets or bleeding - bruising, pinpoint red spots on the skin, black, tarry stools, blood in the urine  -signs of decreased red blood cells - unusually weak or tired, fainting spells, lightheadedness  -breathing problems  -changes in vision  -chest pain  -mouth sores  -nausea and vomiting  -pain, swelling, redness at site where injected  -pain, tingling, numbness in the hands or feet  -redness, swelling, or sores on hands or feet  -stomach pain  -unusual bleeding  Side effects that usually do not require medical attention (report to your doctor or health care professional if they continue or are bothersome):  -changes in finger or toe nails  -diarrhea  -dry or itchy skin  -hair loss  -headache  -loss of appetite  -sensitivity of eyes to the light  -stomach upset  -unusually teary eyes  This list may not describe all possible side effects. Call your doctor for medical advice about side effects. You may report side effects to FDA at 6-772-FDA-6534.  Where should I keep my medicine?  This drug is given in a hospital or clinic and will not be stored at home.  NOTE: This  sheet is a summary. It may not cover all possible information. If you have questions about this medicine, talk to your doctor, pharmacist, or health care provider.  © 2018 [employer]vier/Gold Standard (2009-04-22 13:53:16)

## 2018-11-30 NOTE — ANESTHESIA POSTPROCEDURE EVALUATION
Patient: Shama Caballero    Procedure Summary     Date:  11/30/18 Room / Location:  [facility] OR 01 / [facility]    Anesthesia Start:  0816 Anesthesia Stop:  0911    Procedure:  PORTACATH INSERTION (N/A Chest) Diagnosis:       Malignant neoplasm of ascending colon (CMS/HCC) (HCC)      (Malignant neoplasm of ascending colon (CMS/HCC) (HCC) [C18.2])    Surgeon:  Tremaine Rodriguez MD Responsible Provider:  Melanie Milligan CRNA    Anesthesia Type:  MAC ASA Status:  3          Anesthesia Type: MAC  Last vitals  BP  116/78    Temp      Pulse  79   Resp  12    SpO2  97% on RA    Pain Score        Anesthesia Post Evaluation    Patient location during evaluation: PACU (Phase II)  Patient participation: complete - patient participated  Level of consciousness: awake and alert  Pain management: adequate  Airway patency: patent  Anesthetic complications: no  Cardiovascular status: acceptable  Respiratory status: acceptable  Hydration status: acceptable  May dismiss recovered patient based on consultation with the appropriate physicians and/or meeting appropriate discharge criteria

## 2018-12-03 ENCOUNTER — OFFICE VISIT NO LOS (OUTPATIENT)
Dept: ONCOLOGY | Facility: CLINIC | Age: 73
End: 2018-12-03
Payer: MEDICARE

## 2018-12-03 ENCOUNTER — APPOINTMENT (OUTPATIENT)
Dept: ONCOLOGY | Facility: CLINIC | Age: 73
End: 2018-12-03
Payer: MEDICARE

## 2018-12-03 ENCOUNTER — INFUSION (OUTPATIENT)
Dept: ONCOLOGY | Facility: CLINIC | Age: 73
End: 2018-12-03
Payer: MEDICARE

## 2018-12-03 VITALS
WEIGHT: 108.4 LBS | HEIGHT: 62 IN | DIASTOLIC BLOOD PRESSURE: 77 MMHG | OXYGEN SATURATION: 98 % | TEMPERATURE: 97.8 F | HEART RATE: 76 BPM | SYSTOLIC BLOOD PRESSURE: 140 MMHG | BODY MASS INDEX: 19.95 KG/M2

## 2018-12-03 DIAGNOSIS — C18.2 MALIGNANT NEOPLASM OF ASCENDING COLON (CMS/HCC): ICD-10-CM

## 2018-12-03 DIAGNOSIS — C18.2 MALIGNANT NEOPLASM OF ASCENDING COLON (CMS/HCC): Primary | ICD-10-CM

## 2018-12-03 LAB
ALBUMIN SERPL-MCNC: 4.4 G/DL (ref 3.5–5.3)
ALP SERPL-CCNC: 71 U/L (ref 55–142)
ALT SERPL-CCNC: 11 U/L (ref 0–52)
ANION GAP SERPL CALC-SCNC: 10 MMOL/L (ref 3–11)
ANISOCYTOSIS PRESENCE IN BLOOD, ANALYZER: ABNORMAL
AST SERPL-CCNC: 20 U/L (ref 0–39)
BASOPHILS # BLD AUTO: 0 10*3/UL
BASOPHILS NFR BLD AUTO: 1 % (ref 0–2)
BILIRUB SERPL-MCNC: 0.38 MG/DL (ref 0–1.4)
BUN SERPL-MCNC: 15 MG/DL (ref 7–25)
CALCIUM ALBUM COR SERPL-MCNC: 9.3 MG/DL (ref 8.6–10.3)
CALCIUM SERPL-MCNC: 9.6 MG/DL (ref 8.6–10.3)
CHLORIDE SERPL-SCNC: 103 MMOL/L (ref 98–107)
CO2 SERPL-SCNC: 24 MMOL/L (ref 21–32)
CREAT SERPL-MCNC: 0.66 MG/DL (ref 0.6–1.2)
EOSINOPHIL # BLD AUTO: 0.2 10*3/UL
EOSINOPHIL NFR BLD AUTO: 3 % (ref 0–3)
ERYTHROCYTE [DISTWIDTH] IN BLOOD BY AUTOMATED COUNT: 18.4 % (ref 11.5–14)
GFR SERPL CREATININE-BSD FRML MDRD: 89 ML/MIN/1.73M*2
GLUCOSE SERPL-MCNC: 168 MG/DL (ref 70–105)
HCT VFR BLD AUTO: 40.3 % (ref 34–45)
HGB BLD-MCNC: 12.8 G/DL (ref 11.5–15.5)
HYPOCHROMIA PRESENCE IN BLOOD, ANALYZER: ABNORMAL
LYMPHOCYTES # BLD AUTO: 1.4 10*3/UL
LYMPHOCYTES NFR BLD AUTO: 27 % (ref 11–47)
MCH RBC QN AUTO: 25 PG (ref 28–33)
MCHC RBC AUTO-ENTMCNC: 31.7 G/DL (ref 32–36)
MCV RBC AUTO: 78.9 FL (ref 81–97)
MICROCYTOSIS PRESENCE IN BLOOD, ANALYZER: ABNORMAL
MONOCYTES # BLD AUTO: 0.5 10*3/UL
MONOCYTES NFR BLD AUTO: 8 % (ref 3–11)
NEUTROPHILS # BLD AUTO: 3.4 10*3/UL
NEUTROPHILS NFR BLD AUTO: 61 % (ref 41–81)
PLATELET # BLD AUTO: 240 10*3/UL (ref 140–350)
PMV BLD AUTO: 7.3 FL (ref 6.9–10.8)
POTASSIUM SERPL-SCNC: 3.5 MMOL/L (ref 3.5–5.1)
PROT SERPL-MCNC: 7 G/DL (ref 6–8.3)
RBC # BLD AUTO: 5.11 10*6/ΜL (ref 3.7–5.3)
SODIUM SERPL-SCNC: 137 MMOL/L (ref 135–145)
WBC # BLD AUTO: 5.5 10*3/UL (ref 4.5–10.5)

## 2018-12-03 PROCEDURE — 99999 PR OFFICE/OUTPT VISIT,PROCEDURE ONLY: CPT | Performed by: SOCIAL WORKER

## 2018-12-03 PROCEDURE — G0463 HOSPITAL OUTPT CLINIC VISIT: HCPCS

## 2018-12-03 PROCEDURE — 96375 TX/PRO/DX INJ NEW DRUG ADDON: CPT

## 2018-12-03 PROCEDURE — 96415 CHEMO IV INFUSION ADDL HR: CPT

## 2018-12-03 PROCEDURE — 2580000300 HC RX 258: Performed by: NURSE PRACTITIONER

## 2018-12-03 PROCEDURE — 85025 COMPLETE CBC W/AUTO DIFF WBC: CPT

## 2018-12-03 PROCEDURE — G0463 HOSPITAL OUTPT CLINIC VISIT: HCPCS | Mod: 25

## 2018-12-03 PROCEDURE — 80053 COMPREHEN METABOLIC PANEL: CPT

## 2018-12-03 PROCEDURE — 96368 THER/DIAG CONCURRENT INF: CPT

## 2018-12-03 PROCEDURE — 96413 CHEMO IV INFUSION 1 HR: CPT

## 2018-12-03 PROCEDURE — 2500000200 HC RX 250 WO HCPCS: Performed by: NURSE PRACTITIONER

## 2018-12-03 PROCEDURE — 36415 COLL VENOUS BLD VENIPUNCTURE: CPT

## 2018-12-03 PROCEDURE — 96416 CHEMO PROLONG INFUSE W/PUMP: CPT

## 2018-12-03 PROCEDURE — 96411 CHEMO IV PUSH ADDL DRUG: CPT

## 2018-12-03 PROCEDURE — 6360000200 HC RX 636 W HCPCS (ALT 250 FOR IP): Performed by: NURSE PRACTITIONER

## 2018-12-03 RX ORDER — SODIUM CHLORIDE 0.9 % (FLUSH) 0.9 %
10 SYRINGE (ML) INJECTION AS NEEDED
Status: CANCELLED | OUTPATIENT
Start: 2018-12-03

## 2018-12-03 RX ORDER — DEXTROSE MONOHYDRATE 50 MG/ML
50 INJECTION, SOLUTION INTRAVENOUS AS NEEDED
Status: DISCONTINUED | OUTPATIENT
Start: 2018-12-03 | End: 2018-12-03 | Stop reason: HOSPADM

## 2018-12-03 RX ORDER — FLUOROURACIL 50 MG/ML
400 INJECTION, SOLUTION INTRAVENOUS ONCE
Status: COMPLETED | OUTPATIENT
Start: 2018-12-03 | End: 2018-12-03

## 2018-12-03 RX ORDER — PALONOSETRON 0.05 MG/ML
0.25 INJECTION, SOLUTION INTRAVENOUS ONCE
Status: CANCELLED | OUTPATIENT
Start: 2018-12-03

## 2018-12-03 RX ORDER — HEPARIN 100 UNIT/ML
500 SYRINGE INTRAVENOUS AS NEEDED
Status: CANCELLED | OUTPATIENT
Start: 2018-12-03

## 2018-12-03 RX ORDER — DEXTROSE MONOHYDRATE 50 MG/ML
50 INJECTION, SOLUTION INTRAVENOUS AS NEEDED
Status: CANCELLED | OUTPATIENT
Start: 2018-12-03

## 2018-12-03 RX ORDER — FLUOROURACIL 50 MG/ML
400 INJECTION, SOLUTION INTRAVENOUS ONCE
Status: CANCELLED | OUTPATIENT
Start: 2018-12-03

## 2018-12-03 RX ORDER — DIPHENHYDRAMINE HYDROCHLORIDE 50 MG/ML
50 INJECTION INTRAMUSCULAR; INTRAVENOUS ONCE AS NEEDED
Status: CANCELLED | OUTPATIENT
Start: 2018-12-03

## 2018-12-03 RX ORDER — LORAZEPAM 2 MG/ML
1 INJECTION INTRAMUSCULAR ONCE AS NEEDED
Status: CANCELLED | OUTPATIENT
Start: 2018-12-03

## 2018-12-03 RX ORDER — PALONOSETRON 0.05 MG/ML
0.25 INJECTION, SOLUTION INTRAVENOUS ONCE
Status: COMPLETED | OUTPATIENT
Start: 2018-12-03 | End: 2018-12-03

## 2018-12-03 RX ADMIN — PALONOSETRON HYDROCHLORIDE 0.25 MG: 0.25 INJECTION INTRAVENOUS at 09:53

## 2018-12-03 RX ADMIN — DEXTROSE MONOHYDRATE 588 MG: 50 INJECTION, SOLUTION INTRAVENOUS at 10:02

## 2018-12-03 RX ADMIN — FLUOROURACIL 3550 MG: 50 INJECTION, SOLUTION INTRAVENOUS at 12:28

## 2018-12-03 RX ADMIN — DEXAMETHASONE SODIUM PHOSPHATE 12 MG: 10 INJECTION, SOLUTION INTRAMUSCULAR; INTRAVENOUS at 09:51

## 2018-12-03 RX ADMIN — FLUOROURACIL 590 MG: 50 INJECTION, SOLUTION INTRAVENOUS at 12:28

## 2018-12-03 RX ADMIN — OXALIPLATIN 125 MG: 5 INJECTION, SOLUTION, CONCENTRATE INTRAVENOUS at 10:02

## 2018-12-03 RX ADMIN — DEXTROSE MONOHYDRATE 50 ML/HR: 50 INJECTION, SOLUTION INTRAVENOUS at 09:50

## 2018-12-03 NOTE — PSYCHOTHERAPY
"Introductory Visit     met with patient and her , Daniel, while patient was receiving her C1D1.  explained the role of the Patient Support Team and services offered. Patient and Daniel are from [place]. They came down the night before because they were not sure about road conditions. Patient and  are going to come down the night before for every chemo tx as they do not want to miss any. Patient shared her first tx was going well and everyone was being helpful and knowledgeable. Daniel shared patient was a little \"apprehensive\" this morning, but was feeling much better as the tx has progressed. Patient will be coming down every 2 weeks for chemo. Patient and Daniel did not have any questions or concerns for  at this time.     No immediate needs identified at this time.  will assist as needed.   "

## 2018-12-03 NOTE — PROGRESS NOTES
[facility], [facility] [place]  [phone]    Patient: Shama Caballero    Chemotherapy Regimen: FOLFOX    To prevent nausea or vomiting, take anti-nausea medications according to the schedule below. If you have no nausea or vomiting, continue to take these medications as scheduled and do not skip any doses.     Day Morning Bedtime   Day 1  (Chemo Day)  Prochlorperazine 10 mg     Day 2   Prochlorperazine 10 mg  Dexamethasone 8 mg (take with food)   Prochlorperazine 10 mg     Day 3   Prochlorperazine 10 mg  Dexamethasone 8 mg (take with food)   Prochlorperazine 10 mg     Day 4   Prochlorperazine 10 mg        If you have breakthrough nausea or vomiting anytime:  Take additional doses of Prochlorperazine 10 mg every 6 hours as needed.  Maximum is 4 tablets per day total.     • Prochlorperazine is the generic name and is also called Compazine (brand name).      Remember:  · See a doctor immediately if you have vomiting that does not get better with these medications.  · Seek medical attention within 24 hours if you have nausea that does not allow you to eat or drink.

## 2018-12-04 DIAGNOSIS — I10 ESSENTIAL HYPERTENSION: ICD-10-CM

## 2018-12-04 RX ORDER — LOSARTAN POTASSIUM 50 MG/1
TABLET ORAL
Qty: 90 TABLET | Refills: 0 | Status: SHIPPED | OUTPATIENT
Start: 2018-12-04 | End: 2019-02-27 | Stop reason: SDUPTHER

## 2018-12-04 NOTE — PROGRESS NOTES
Oncology Pharmacist Consult  Antiemetics for Chemotherapy Induced Nausea/Vomiting (CINV)    12/04/18  Oncology Provider: Dr. Giraldo    Subjective/Objective:    Pertinent History:  The patient is a 73 y.o. female with colorectal cancer starting therapy today with FOLFOX  Treatment Plans     Name Type Plan dates Plan Provider         Active    Colorectal - Adjuvant [id number] ONCOLOGY TREATMENT A  12/2/2018 - Present M. Behnan Sahin, MD                Pharmacist consulted to manage antiemetics for prevention and treatment of CINV, to review patient’s medication list for potential drug/drug interactions, and to provide patient education regarding antiemetic(s) to be taken at home.    Past Medical History:   Diagnosis Date   • Blood disorder     anemic   • Complication of anesthesia    • Diabetes mellitus (CMS/HCC) (HCC)    • Hypertension    • Malignant neoplasm of ascending colon (CMS/HCC) (HCC) 10/19/2018   • PONV (postoperative nausea and vomiting)    • Shortness of breath     low iron       No Known Allergies      Current Outpatient Medications:   •  acetaminophen (TYLENOL) 500 mg tablet, Take 2 tablets (1,000 mg total) by mouth every 8 (eight) hours as needed for pain scale 1-3/10., Disp: , Rfl: 0  •  atorvastatin (LIPITOR) 20 mg tablet, TAKE 1 TABLET BY MOUTH EVERY DAY, Disp: 90 tablet, Rfl: 1  •  blood-glucose meter (ACCU-CHEK JOSE MANUEL) misc, D, Disp: 1, Rfl: 0  •  cholecalciferol, vitamin D3, (cholecalciferol) 1,000 unit tablet, Take 2,000 Units by mouth daily., Disp: , Rfl:   •  ferrous sulfate (SLOW FE) 142 mg (45 mg iron) tablet extended release, Take 1 tablet by mouth daily., Disp: , Rfl:   •  heparin, porcine, PF, 100 unit/mL syringe, Infuse 5 mL into a venous catheter as needed (to flush port) Syringe #2, Disp: 10 mL, Rfl: 11  •  lancets (ACCU-CHEK FASTCLIX) [facility], USE BID TO TEST BLOOD SUGAR LEVELS, Disp: 204, Rfl: 1  •  lancing device with lancets (ACCU-CHEK FASTCLIX) kit, Use as directed to check blood sugars  twice daily., Disp: 100 each, Rfl: 3  •  lidocaine-prilocaine (EMLA) cream, Apply 1 application topically as needed for pain scale 1-3/10 Apply over port site 1 hour before scheduled access, Disp: 30 g, Rfl: 2  •  lidocaine-prilocaine (EMLA) cream, Apply thick layer to intact skin over port one hour prior to procedure. Cover with occlusive dressing., Disp: 30 g, Rfl: 11  •  losartan (COZAAR) 50 mg tablet, TAKE 1 TABLET BY MOUTH EVERY DAY, Disp: 90 tablet, Rfl: 0  •  metFORMIN (GLUCOPHAGE) 500 mg tablet, TAKE 1 TABLET BY MOUTH TWICE DAILY WITH MEALS, Disp: 180 tablet, Rfl: 1  •  sennosides-docusate sodium (SENNA WITH DOCUSATE SODIUM) 8.6-50 mg, Take 1 tablet by mouth 2 (two) times a day as needed for constipation. Please take 1 tab BID while on narcotic pain medication to prevent constipation, Disp: 30 tablet, Rfl: 0  •  sodium chloride injection, Infuse 10 mL into a venous catheter as needed (to flush port) Syringe #1, Disp: 20 mL, Rfl: 11  No current facility-administered medications for this visit.     Antiemetic Plan of Care:  Palonosetron 0.25 mg IV prior to chemo on   Dexamethasone 12 mg IV prior to chemo on  Dexamethasone 8 mg PO once daily on days 2 and 3  Prochlorperazine 10 mg PO BID x 3 days starting evening of chemo, and q6h prn breakthrough n/v.     Patient's medication list was reviewed with patient for any changes, and checked for any drug-drug interactions with fluorouracil, leucovorin, oxaliplatin, palonosetron, dexamethasone, and prochlorperazine. Patient has type 2 diabetes and dexamethasone may increase patient's blood glucose.    Assessment:  FOLFOX is a moderately emetogenic chemotherapy regimen requiring pre and post chemo antiemetic medications.    Plan:  Patient Education:  Discussed plan for antiemetics with patient and provided education regarding antiemetics.  The patient received a paper copy of the Antiemetic Calendar and was given verbal instructions on how to take antiemetics. Patient  was advised that prochlorperazine may cause drowsiness, and to avoid driving and other activities requiring mental alertness. We discussed that dexamethasone may increase her blood glucose and she will want to keep an eye on her levels. Advised her to speak with her primary care provider if her blood glucose levels increase.  Patient advised when to seek medical attention for uncontrolled nausea/vomiting, and was given clinic/pharmacist contact information.  Patient/caregiver questions were answered.    Patient was instructed to contact nurse/doctor/pharmacist with any issues regarding uncontrolled nausea and/or vomiting.    Total time spent on this consult was 20 minutes, of which 10 minutes were spent face-to-face with patient.    Thank you for the consult,    David Murcia, PharmD

## 2018-12-05 ENCOUNTER — HOSPITAL ENCOUNTER (OUTPATIENT)
Dept: INFUSION THERAPY | Facility: HOSPITAL | Age: 73
Setting detail: INFUSION SERIES
Discharge: HOME | End: 2018-12-05
Payer: MEDICARE

## 2018-12-05 VITALS
HEART RATE: 77 BPM | RESPIRATION RATE: 16 BRPM | TEMPERATURE: 97.7 F | DIASTOLIC BLOOD PRESSURE: 59 MMHG | SYSTOLIC BLOOD PRESSURE: 135 MMHG

## 2018-12-05 DIAGNOSIS — C18.2 MALIGNANT NEOPLASM OF ASCENDING COLON (CMS/HCC): Primary | ICD-10-CM

## 2018-12-05 DIAGNOSIS — K63.89 MASS OF CECUM: ICD-10-CM

## 2018-12-05 PROCEDURE — 6360000200 HC RX 636 W HCPCS (ALT 250 FOR IP): Performed by: NURSE PRACTITIONER

## 2018-12-05 PROCEDURE — G0463 HOSPITAL OUTPT CLINIC VISIT: HCPCS

## 2018-12-05 RX ORDER — HEPARIN 100 UNIT/ML
500 SYRINGE INTRAVENOUS AS NEEDED
Status: DISCONTINUED | OUTPATIENT
Start: 2018-12-05 | End: 2018-12-06 | Stop reason: HOSPADM

## 2018-12-05 RX ORDER — HEPARIN 100 UNIT/ML
500 SYRINGE INTRAVENOUS AS NEEDED
Status: CANCELLED | OUTPATIENT
Start: 2018-12-17

## 2018-12-05 RX ORDER — SODIUM CHLORIDE 0.9 % (FLUSH) 0.9 %
10 SYRINGE (ML) INJECTION AS NEEDED
Status: DISCONTINUED | OUTPATIENT
Start: 2018-12-05 | End: 2018-12-06 | Stop reason: HOSPADM

## 2018-12-05 RX ORDER — SODIUM CHLORIDE 0.9 % (FLUSH) 0.9 %
10 SYRINGE (ML) INJECTION AS NEEDED
Status: CANCELLED | OUTPATIENT
Start: 2018-12-17

## 2018-12-05 RX ADMIN — Medication 20 ML: at 10:42

## 2018-12-05 RX ADMIN — Medication 5 ML: at 10:42

## 2018-12-05 ASSESSMENT — PAIN SCALES - GENERAL: PAINLEVEL: 0-NO PAIN

## 2018-12-17 ENCOUNTER — OFFICE VISIT (OUTPATIENT)
Dept: ONCOLOGY | Facility: CLINIC | Age: 73
End: 2018-12-17
Payer: MEDICARE

## 2018-12-17 ENCOUNTER — INFUSION (OUTPATIENT)
Dept: ONCOLOGY | Facility: CLINIC | Age: 73
End: 2018-12-17
Payer: MEDICARE

## 2018-12-17 ENCOUNTER — APPOINTMENT (OUTPATIENT)
Dept: ONCOLOGY | Facility: CLINIC | Age: 73
End: 2018-12-17
Payer: MEDICARE

## 2018-12-17 VITALS
WEIGHT: 110 LBS | BODY MASS INDEX: 19.99 KG/M2 | DIASTOLIC BLOOD PRESSURE: 74 MMHG | HEART RATE: 83 BPM | OXYGEN SATURATION: 96 % | SYSTOLIC BLOOD PRESSURE: 167 MMHG | TEMPERATURE: 98.2 F

## 2018-12-17 DIAGNOSIS — C18.2 MALIGNANT NEOPLASM OF ASCENDING COLON (CMS/HCC): ICD-10-CM

## 2018-12-17 DIAGNOSIS — C18.2 MALIGNANT NEOPLASM OF ASCENDING COLON (CMS/HCC): Primary | ICD-10-CM

## 2018-12-17 LAB
ALBUMIN SERPL-MCNC: 4.4 G/DL (ref 3.5–5.3)
ALP SERPL-CCNC: 64 U/L (ref 55–142)
ALT SERPL-CCNC: 15 U/L (ref 0–52)
ANION GAP SERPL CALC-SCNC: 8 MMOL/L (ref 3–11)
ANISOCYTOSIS PRESENCE IN BLOOD, ANALYZER: ABNORMAL
AST SERPL-CCNC: 22 U/L (ref 0–39)
BASOPHILS # BLD AUTO: 0 10*3/UL
BASOPHILS NFR BLD AUTO: 0 % (ref 0–2)
BILIRUB SERPL-MCNC: 0.36 MG/DL (ref 0–1.4)
BUN SERPL-MCNC: 14 MG/DL (ref 7–25)
CALCIUM ALBUM COR SERPL-MCNC: 9.2 MG/DL (ref 8.6–10.3)
CALCIUM SERPL-MCNC: 9.5 MG/DL (ref 8.6–10.3)
CHLORIDE SERPL-SCNC: 106 MMOL/L (ref 98–107)
CO2 SERPL-SCNC: 26 MMOL/L (ref 21–32)
CREAT SERPL-MCNC: 0.6 MG/DL (ref 0.6–1.2)
EOSINOPHIL # BLD AUTO: 0 10*3/UL
EOSINOPHIL NFR BLD AUTO: 2 % (ref 0–3)
ERYTHROCYTE [DISTWIDTH] IN BLOOD BY AUTOMATED COUNT: 19 % (ref 11.5–14)
GFR SERPL CREATININE-BSD FRML MDRD: 92 ML/MIN/1.73M*2
GLUCOSE SERPL-MCNC: 95 MG/DL (ref 70–105)
HCT VFR BLD AUTO: 37.6 % (ref 34–45)
HGB BLD-MCNC: 11.9 G/DL (ref 11.5–15.5)
HYPOCHROMIA PRESENCE IN BLOOD, ANALYZER: ABNORMAL
LYMPHOCYTES # BLD AUTO: 1.2 10*3/UL
LYMPHOCYTES NFR BLD AUTO: 50 % (ref 11–47)
MCH RBC QN AUTO: 25.4 PG (ref 28–33)
MCHC RBC AUTO-ENTMCNC: 31.8 G/DL (ref 32–36)
MCV RBC AUTO: 79.8 FL (ref 81–97)
MICROCYTOSIS PRESENCE IN BLOOD, ANALYZER: ABNORMAL
MONOCYTES # BLD AUTO: 0.3 10*3/UL
MONOCYTES NFR BLD AUTO: 14 % (ref 3–11)
NEUTROPHILS # BLD AUTO: 0.8 10*3/UL
NEUTROPHILS NFR BLD AUTO: 34 % (ref 41–81)
PLATELET # BLD AUTO: 152 10*3/UL (ref 140–350)
PMV BLD AUTO: 6.6 FL (ref 6.9–10.8)
POTASSIUM SERPL-SCNC: 3.5 MMOL/L (ref 3.5–5.1)
PROT SERPL-MCNC: 6.7 G/DL (ref 6–8.3)
RBC # BLD AUTO: 4.71 10*6/ΜL (ref 3.7–5.3)
SODIUM SERPL-SCNC: 140 MMOL/L (ref 135–145)
WBC # BLD AUTO: 2.3 10*3/UL (ref 4.5–10.5)

## 2018-12-17 PROCEDURE — 80053 COMPREHEN METABOLIC PANEL: CPT

## 2018-12-17 PROCEDURE — G0463 HOSPITAL OUTPT CLINIC VISIT: HCPCS

## 2018-12-17 PROCEDURE — 96372 THER/PROPH/DIAG INJ SC/IM: CPT

## 2018-12-17 PROCEDURE — 99215 OFFICE O/P EST HI 40 MIN: CPT | Mod: 24 | Performed by: NURSE PRACTITIONER

## 2018-12-17 PROCEDURE — 85025 COMPLETE CBC W/AUTO DIFF WBC: CPT

## 2018-12-17 PROCEDURE — 6360000200 HC RX 636 W HCPCS (ALT 250 FOR IP): Performed by: NURSE PRACTITIONER

## 2018-12-17 PROCEDURE — 36415 COLL VENOUS BLD VENIPUNCTURE: CPT

## 2018-12-17 RX ADMIN — FILGRASTIM-SNDZ 300 MCG: 300 INJECTION, SOLUTION INTRAVENOUS; SUBCUTANEOUS at 09:12

## 2018-12-17 ASSESSMENT — ENCOUNTER SYMPTOMS
EXTREMITY WEAKNESS: 0
ABDOMINAL PAIN: 0
APPETITE CHANGE: 0
SPEECH DIFFICULTY: 0
COUGH: 0
EYE PROBLEMS: 0
DYSURIA: 0
BLOOD IN STOOL: 0
SEIZURES: 0
FATIGUE: 0
LIGHT-HEADEDNESS: 0
TROUBLE SWALLOWING: 0
HEMOPTYSIS: 0
HEMATURIA: 0
ARTHRALGIAS: 0
LEG SWELLING: 0
NECK STIFFNESS: 0
SCLERAL ICTERUS: 0
WHEEZING: 0
ADENOPATHY: 0
NERVOUS/ANXIOUS: 0
PALPITATIONS: 0
HEADACHES: 0
SORE THROAT: 0
SHORTNESS OF BREATH: 0
DIFFICULTY URINATING: 0
MYALGIAS: 0
DEPRESSION: 0
DIAPHORESIS: 0
NUMBNESS: 0
UNEXPECTED WEIGHT CHANGE: 0
FREQUENCY: 0
ABDOMINAL DISTENTION: 0
BRUISES/BLEEDS EASILY: 0
FLANK PAIN: 0
NECK PAIN: 0
FEVER: 0
WOUND: 0
SLEEP DISTURBANCE: 0
VOMITING: 0
CHILLS: 0
VOICE CHANGE: 0
DIZZINESS: 0
DIARRHEA: 1
NAUSEA: 0
CHEST TIGHTNESS: 0
BACK PAIN: 0
CONSTIPATION: 0

## 2018-12-17 NOTE — PROGRESS NOTES
"Hematology/Medical Oncology Follow-Up    Patient Name: Shama Caballero  YOB: 1945  Medical Record Number: [id number]    DATE OF SERVICE:   12/17/2018    CHIEF COMPLAINT:  Shama Caballero is a 73 y.o. female who presents today for follow up and treatment of her colon cancer.    ONCOLOGY HISTORY:  The patient was diagnosed with colon cancer in the fall 2018.  She had a history of iron deficiency anemia for approximately 2 years.  In the fall 2018 she felt a lump in her right lower quadrant associated with indigestion.  She had lost approximately 5 pounds in the same timeframe.  Because of her symptoms she was seen and underwent a CT scan which showed the presence of a tumor involving the ascending colon.  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  She underwent resection on October 25, 2018.  Surgical margins were negative, the tumor extended through pericolonic tissues.  There were 19 lymph nodes removed in 1 was metastatic.  Pathologically was a [id number] colon cancer.  After she recovered from surgery she was seen in consultation with Dr. Fierro on November 16.  Because of her high risk for recurrence he recommended adjuvant chemotherapy with FOLFOX over a period of 6 months.    HISTORY OF PRESENT ILLNESS:  She is here today to consider cycle two of FOLFOX therapy. She did well after the first cycle. She had a complaint of jaw pain with the \"first bite of food\" for about 3-4 days after chemotherapy, it resolved spontaneously and she hasn't had further issues. Her appetite and energy level were good and she has remained active. She has not had fever, sign of infection or bleeding.      Past Medical History:   Diagnosis Date   • Blood disorder     anemic   • Complication of anesthesia    • Diabetes mellitus (CMS/HCC) (HCC)    • Hypertension    • Malignant neoplasm of ascending colon (CMS/HCC) (HCC) 10/19/2018 "   • PONV (postoperative nausea and vomiting)    • Shortness of breath     low iron     Past Surgical History:   Procedure Laterality Date   • CHOLECYSTECTOMY     • COLON SURGERY Right 10/25/2018     LAPAROSCOPIC RIGHT HEMICOLECTOMY- Dr Rodriguez   • COLONOSCOPY N/A 10/19/2018    Dr Rodriguez   • EYE SURGERY Bilateral     CATARACT   • HYSTERECTOMY     • PORTACATH PLACEMENT N/A 11/30/2018    Procedure: PORTACATH INSERTION;  Surgeon: Tremaine Rodriguez MD;  Location: [facility];  Service: General;  Laterality: N/A;   • TONSILLECTOMY       Social History     Socioeconomic History   • Marital status:      Spouse name: Not on file   • Number of children: Not on file   • Years of education: Not on file   • Highest education level: Not on file   Social Needs   • Financial resource strain: Not on file   • Food insecurity - worry: Not on file   • Food insecurity - inability: Not on file   • Transportation needs - medical: Not on file   • Transportation needs - non-medical: Not on file   Occupational History   • Not on file   Tobacco Use   • Smoking status: Never Smoker   • Smokeless tobacco: Never Used   Substance and Sexual Activity   • Alcohol use: Yes     Alcohol/week: 4.2 oz     Types: 7 Glasses of wine per week     Comment: WEEKLY   • Drug use: No   • Sexual activity: Not on file   Other Topics Concern   • Not on file   Social History Narrative   • Not on file      ALLERGIES:     Allergies as of 12/17/2018   • (No Known Allergies)      MEDICATIONS:     Current Outpatient Medications   Medication Sig Dispense Refill   • acetaminophen (TYLENOL) 500 mg tablet Take 2 tablets (1,000 mg total) by mouth every 8 (eight) hours as needed for pain scale 1-3/10.  0   • atorvastatin (LIPITOR) 20 mg tablet TAKE 1 TABLET BY MOUTH EVERY DAY 90 tablet 1   • blood-glucose meter (ACCU-CHEK JOSE MANUEL) misc FPD 1 0   • cholecalciferol, vitamin D3, (cholecalciferol) 1,000 unit tablet Take 2,000 Units by mouth daily.     • ferrous sulfate  (SLOW FE) 142 mg (45 mg iron) tablet extended release Take 1 tablet by mouth daily.     • heparin, porcine, PF, 100 unit/mL syringe Infuse 5 mL into a venous catheter as needed (to flush port) Syringe #2 10 mL 11   • lancets (ACCU-CHEK FASTCLIX) misc USE BID TO TEST BLOOD SUGAR LEVELS 204 1   • lancing device with lancets (ACCU-CHEK FASTCLIX) kit Use as directed to check blood sugars twice daily. 100 each 3   • lidocaine-prilocaine (EMLA) cream Apply 1 application topically as needed for pain scale 1-3/10 Apply over port site 1 hour before scheduled access 30 g 2   • lidocaine-prilocaine (EMLA) cream Apply thick layer to intact skin over port one hour prior to procedure. Cover with occlusive dressing. 30 g 11   • losartan (COZAAR) 50 mg tablet TAKE 1 TABLET BY MOUTH EVERY DAY 90 tablet 0   • metFORMIN (GLUCOPHAGE) 500 mg tablet TAKE 1 TABLET BY MOUTH TWICE DAILY WITH MEALS 180 tablet 1   • sennosides-docusate sodium (SENNA WITH DOCUSATE SODIUM) 8.6-50 mg Take 1 tablet by mouth 2 (two) times a day as needed for constipation. Please take 1 tab BID while on narcotic pain medication to prevent constipation 30 tablet 0   • sodium chloride injection Infuse 10 mL into a venous catheter as needed (to flush port) Syringe #1 20 mL 11     No current facility-administered medications for this visit.      REVIEW OF SYSTEMS:   The ECOG performance status today is .0 - Asymptomatic  Review of Systems   Constitutional: Negative for appetite change, chills, diaphoresis, fatigue, fever and unexpected weight change.   HENT:   Negative for hearing loss, lump/mass, mouth sores, nosebleeds, sore throat, tinnitus, trouble swallowing and voice change.         Jaw pain with first bite of food x 3-4 days after chemotherapy   Eyes: Negative for eye problems and icterus.   Respiratory: Negative for chest tightness, cough, hemoptysis, shortness of breath and wheezing.    Cardiovascular: Negative for chest pain, leg swelling and palpitations.    Gastrointestinal: Positive for diarrhea (<3 episodes, relieved by imodium). Negative for abdominal distention, abdominal pain, blood in stool, constipation, nausea and vomiting.   Genitourinary: Negative for difficulty urinating, dysuria, frequency and hematuria.    Musculoskeletal: Negative for arthralgias, back pain, flank pain, gait problem, myalgias, neck pain and neck stiffness.   Skin: Negative for itching, rash and wound.   Neurological: Negative for dizziness, extremity weakness, gait problem, headaches, light-headedness, numbness, seizures and speech difficulty.   Hematological: Negative for adenopathy. Does not bruise/bleed easily.   Psychiatric/Behavioral: Negative for depression and sleep disturbance. The patient is not nervous/anxious.    All other systems reviewed and are negative.      PHYSICAL EXAM:   /74   Pulse 83   Temp 36.8 °C (98.2 °F)   Wt 49.9 kg (110 lb)   SpO2 96%   BMI 19.99 kg/m²  Pain 0/10 today.    Physical Exam   Constitutional: She is oriented to person, place, and time. She appears well-developed and well-nourished. No distress.   HENT:   Head: Normocephalic.   Nose: Nose normal.   Mouth/Throat: Oropharynx is clear and moist.   Eyes: Conjunctivae are normal. Right eye exhibits no discharge. Left eye exhibits no discharge. No scleral icterus.   Neck: Neck supple. No thyromegaly present.   Cardiovascular: Normal rate, regular rhythm, normal heart sounds and intact distal pulses. Exam reveals no gallop and no friction rub.   No murmur heard.  Pulmonary/Chest: Effort normal and breath sounds normal. No respiratory distress. She has no wheezes. She has no rhonchi. She has no rales. She exhibits no tenderness.   Abdominal: Soft. Bowel sounds are normal. She exhibits no distension and no mass. There is no hepatosplenomegaly. There is no tenderness. There is no rebound and no guarding.   Musculoskeletal: Normal range of motion. She exhibits no edema or tenderness.    Lymphadenopathy:        Head (right side): No submental, no submandibular, no tonsillar, no preauricular, no posterior auricular and no occipital adenopathy present.        Head (left side): No submental, no submandibular, no tonsillar, no preauricular, no posterior auricular and no occipital adenopathy present.     She has no cervical adenopathy.     She has no axillary adenopathy.        Right: No inguinal and no supraclavicular adenopathy present.        Left: No inguinal and no supraclavicular adenopathy present.   Neurological: She is alert and oriented to person, place, and time. No cranial nerve deficit.   Skin: Skin is warm and dry. Capillary refill takes less than 2 seconds. No rash noted. No erythema. No pallor.   Right chest wall port well healed   Psychiatric: She has a normal mood and affect. Her behavior is normal. Judgment and thought content normal.       LABORATORY DATA:      Lab Results   Component Value Date    WBC 2.3 (L) 12/17/2018    HGB 11.9 12/17/2018    HCT 37.6 12/17/2018     12/17/2018    RBC 4.71 12/17/2018    MCV 79.8 (L) 12/17/2018    MCH 25.4 (L) 12/17/2018    MCHC 31.8 (L) 12/17/2018    RDW 19.0 (H) 12/17/2018    MPV 6.6 (L) 12/17/2018    NEUTROABS 0.80 12/17/2018     Lab Results   Component Value Date     12/17/2018    K 3.5 12/17/2018     12/17/2018    CO2 26 12/17/2018    BUN 14 12/17/2018    CREATININE 0.60 12/17/2018    GLUCOSE 95 12/17/2018    CALCIUM 9.5 12/17/2018    PROT 6.7 12/17/2018    ALBUMIN 4.4 12/17/2018    AST 22 12/17/2018    ALT 15 12/17/2018    ALKPHOS 64 12/17/2018    BILITOT 0.36 12/17/2018       DIAGNOSTIC IMAGING:   None performed today.      IMPRESSION & PLAN:   1.  Colon cancer: She appears to be clinically stable without signs or symptoms of disease based on physical examination laboratory data from today's visit.  She tolerated the first cycle of FOLFOX therapy well and with the exception of her neutrophil count her labs are normal.   Because of low neutrophils I discussed her labs with Dr. Fierro.  He recommended neopogen x 3 days, continue chemotherapy next week.  We will discontinue 5-FU and leucovorin injections and not make any further dose adjustments until we see what her counts are again in 3 weeks. We did review indications and potential side effects with Neupogen injections. She was instructed to take claritin and tylenol for bone discomfort  2.  Diarrhea: Grade 1.  Secondary to 5-FU therapy.  She will start using Imodium if needed.  3.  Follow-up next week for chemotherapy and again in 3 weeks with repeat CBC, CMP labs prior to the next cycle.  Instructed to call with any questions or concerns.        Electronically signed by: DANIKA ORTIZ CNP

## 2018-12-18 ENCOUNTER — HOSPITAL ENCOUNTER (OUTPATIENT)
Dept: INFUSION THERAPY | Facility: HOSPITAL | Age: 73
Setting detail: INFUSION SERIES
Discharge: HOME | End: 2018-12-18
Payer: MEDICARE

## 2018-12-18 VITALS
HEART RATE: 86 BPM | DIASTOLIC BLOOD PRESSURE: 64 MMHG | SYSTOLIC BLOOD PRESSURE: 137 MMHG | RESPIRATION RATE: 20 BRPM | TEMPERATURE: 97.7 F

## 2018-12-18 DIAGNOSIS — C18.2 MALIGNANT NEOPLASM OF ASCENDING COLON (CMS/HCC): Primary | ICD-10-CM

## 2018-12-18 PROCEDURE — 6360000200 HC RX 636 W HCPCS (ALT 250 FOR IP): Mod: TB | Performed by: NURSE PRACTITIONER

## 2018-12-18 PROCEDURE — 96372 THER/PROPH/DIAG INJ SC/IM: CPT

## 2018-12-18 RX ADMIN — FILGRASTIM-SNDZ 300 MCG: 300 INJECTION, SOLUTION INTRAVENOUS; SUBCUTANEOUS at 09:09

## 2018-12-18 ASSESSMENT — PAIN SCALES - GENERAL: PAINLEVEL: 0-NO PAIN

## 2018-12-19 ENCOUNTER — HOSPITAL ENCOUNTER (OUTPATIENT)
Dept: INFUSION THERAPY | Facility: HOSPITAL | Age: 73
Setting detail: INFUSION SERIES
Discharge: HOME | End: 2018-12-19
Payer: MEDICARE

## 2018-12-19 VITALS
TEMPERATURE: 98.2 F | HEART RATE: 81 BPM | OXYGEN SATURATION: 96 % | SYSTOLIC BLOOD PRESSURE: 149 MMHG | RESPIRATION RATE: 16 BRPM | DIASTOLIC BLOOD PRESSURE: 63 MMHG

## 2018-12-19 DIAGNOSIS — C18.2 MALIGNANT NEOPLASM OF ASCENDING COLON (CMS/HCC): Primary | ICD-10-CM

## 2018-12-19 PROCEDURE — 96372 THER/PROPH/DIAG INJ SC/IM: CPT

## 2018-12-19 PROCEDURE — 6360000200 HC RX 636 W HCPCS (ALT 250 FOR IP): Mod: TB

## 2018-12-19 RX ADMIN — FILGRASTIM-SNDZ 300 MCG: 300 INJECTION, SOLUTION INTRAVENOUS; SUBCUTANEOUS at 08:56

## 2018-12-19 ASSESSMENT — PAIN SCALES - GENERAL: PAINLEVEL: 0-NO PAIN

## 2018-12-24 ENCOUNTER — APPOINTMENT (OUTPATIENT)
Dept: ONCOLOGY | Facility: CLINIC | Age: 73
End: 2018-12-24
Payer: MEDICARE

## 2018-12-24 ENCOUNTER — INFUSION (OUTPATIENT)
Dept: ONCOLOGY | Facility: CLINIC | Age: 73
End: 2018-12-24
Payer: MEDICARE

## 2018-12-24 VITALS
TEMPERATURE: 97.3 F | OXYGEN SATURATION: 96 % | SYSTOLIC BLOOD PRESSURE: 153 MMHG | HEART RATE: 99 BPM | DIASTOLIC BLOOD PRESSURE: 80 MMHG

## 2018-12-24 DIAGNOSIS — C18.2 MALIGNANT NEOPLASM OF ASCENDING COLON (CMS/HCC): ICD-10-CM

## 2018-12-24 DIAGNOSIS — C18.2 MALIGNANT NEOPLASM OF ASCENDING COLON (CMS/HCC): Primary | ICD-10-CM

## 2018-12-24 LAB
ALBUMIN SERPL-MCNC: 4.4 G/DL (ref 3.5–5.3)
ALP SERPL-CCNC: 77 U/L (ref 55–142)
ALT SERPL-CCNC: 34 U/L (ref 0–52)
ANION GAP SERPL CALC-SCNC: 8 MMOL/L (ref 3–11)
ANISOCYTOSIS PRESENCE IN BLOOD, ANALYZER: ABNORMAL
AST SERPL-CCNC: 37 U/L (ref 0–39)
BILIRUB SERPL-MCNC: 0.3 MG/DL (ref 0–1.4)
BUN SERPL-MCNC: 13 MG/DL (ref 7–25)
CALCIUM ALBUM COR SERPL-MCNC: 9.3 MG/DL (ref 8.6–10.3)
CALCIUM SERPL-MCNC: 9.6 MG/DL (ref 8.6–10.3)
CHLORIDE SERPL-SCNC: 106 MMOL/L (ref 98–107)
CO2 SERPL-SCNC: 26 MMOL/L (ref 21–32)
CREAT SERPL-MCNC: 0.69 MG/DL (ref 0.6–1.2)
ERYTHROCYTE [DISTWIDTH] IN BLOOD BY AUTOMATED COUNT: 22.1 % (ref 11.5–14)
GFR SERPL CREATININE-BSD FRML MDRD: 88 ML/MIN/1.73M*2
GLUCOSE SERPL-MCNC: 134 MG/DL (ref 70–105)
HCT VFR BLD AUTO: 41.1 % (ref 34–45)
HGB BLD-MCNC: 13.1 G/DL (ref 11.5–15.5)
HYPOCHROMIA PRESENCE IN BLOOD, ANALYZER: ABNORMAL
LYMPHOCYTES # BLD MANUAL: 1.5 10*3/UL
LYMPHOCYTES NFR BLD MANUAL: 31 % (ref 11–47)
MCH RBC QN AUTO: 25.9 PG (ref 28–33)
MCHC RBC AUTO-ENTMCNC: 31.9 G/DL (ref 32–36)
MCV RBC AUTO: 80.9 FL (ref 81–97)
METAMYELOCYTES # BLD MANUAL: 0.1 10*3/UL
METAMYELOCYTES NFR BLD MANUAL: 2 % (ref 0–0)
MONOCYTES # BLD MANUAL: 0.9 10*3/UL
MONOCYTES NFR BLD MANUAL: 19 % (ref 3–11)
MYELOCYTES # BLD MANUAL: 0.1 10*3/UL
MYELOCYTES NFR BLD MANUAL: 2 % (ref 0–0)
NEUTROPHILS # BLD MANUAL: 2.01 10*3/UL
NEUTS BAND # BLD MANUAL: 0 10*3/UL
NEUTS BAND NFR BLD MANUAL: 1 % (ref 0–10)
NEUTS SEG # BLD MANUAL: 2 10*3/UL
NEUTS SEG NFR BLD MANUAL: 40 % (ref 41–81)
PLATELET # BLD AUTO: 160 10*3/UL (ref 140–350)
PLATELET # BLD EST: ADEQUATE 10*3/UL
PMV BLD AUTO: 7 FL (ref 6.9–10.8)
POTASSIUM SERPL-SCNC: 3.6 MMOL/L (ref 3.5–5.1)
PROT SERPL-MCNC: 6.9 G/DL (ref 6–8.3)
RBC # BLD AUTO: 5.08 10*6/ΜL (ref 3.7–5.3)
RBC MORPH BLD: (no result)
SODIUM SERPL-SCNC: 140 MMOL/L (ref 135–145)
TOTAL CELLS COUNTED BLD: 100 CELLS
VARIANT LYMPHS # BLD MANUAL: 0.2 10*3/UL
VARIANT LYMPHS NFR BLD: 5 % (ref 0–1)
WBC # BLD AUTO: 4.9 10*3/UL (ref 4.5–10.5)
WBC NRBC COR # BLD: 4.9 10*3/UL

## 2018-12-24 PROCEDURE — 2500000200 HC RX 250 WO HCPCS: Performed by: NURSE PRACTITIONER

## 2018-12-24 PROCEDURE — 96413 CHEMO IV INFUSION 1 HR: CPT

## 2018-12-24 PROCEDURE — 36415 COLL VENOUS BLD VENIPUNCTURE: CPT

## 2018-12-24 PROCEDURE — 96415 CHEMO IV INFUSION ADDL HR: CPT

## 2018-12-24 PROCEDURE — S0028 INJECTION, FAMOTIDINE, 20 MG: HCPCS | Performed by: INTERNAL MEDICINE

## 2018-12-24 PROCEDURE — 6360000200 HC RX 636 W HCPCS (ALT 250 FOR IP): Performed by: INTERNAL MEDICINE

## 2018-12-24 PROCEDURE — 96416 CHEMO PROLONG INFUSE W/PUMP: CPT

## 2018-12-24 PROCEDURE — 80053 COMPREHEN METABOLIC PANEL: CPT

## 2018-12-24 PROCEDURE — G0463 HOSPITAL OUTPT CLINIC VISIT: HCPCS

## 2018-12-24 PROCEDURE — 85025 COMPLETE CBC W/AUTO DIFF WBC: CPT

## 2018-12-24 PROCEDURE — 6360000200 HC RX 636 W HCPCS (ALT 250 FOR IP): Performed by: NURSE PRACTITIONER

## 2018-12-24 PROCEDURE — 2580000300 HC RX 258: Performed by: NURSE PRACTITIONER

## 2018-12-24 PROCEDURE — 2580000300 HC RX 258: Performed by: INTERNAL MEDICINE

## 2018-12-24 PROCEDURE — 2500000200 HC RX 250 WO HCPCS: Performed by: INTERNAL MEDICINE

## 2018-12-24 PROCEDURE — 96375 TX/PRO/DX INJ NEW DRUG ADDON: CPT

## 2018-12-24 RX ORDER — DEXTROSE MONOHYDRATE 50 MG/ML
50 INJECTION, SOLUTION INTRAVENOUS AS NEEDED
Status: DISCONTINUED | OUTPATIENT
Start: 2018-12-24 | End: 2018-12-24 | Stop reason: HOSPADM

## 2018-12-24 RX ORDER — PALONOSETRON 0.05 MG/ML
0.25 INJECTION, SOLUTION INTRAVENOUS ONCE
Status: CANCELLED | OUTPATIENT
Start: 2018-12-24

## 2018-12-24 RX ORDER — DEXTROSE MONOHYDRATE 50 MG/ML
50 INJECTION, SOLUTION INTRAVENOUS AS NEEDED
Status: CANCELLED | OUTPATIENT
Start: 2018-12-24

## 2018-12-24 RX ORDER — DEXAMETHASONE 4 MG/1
TABLET ORAL
Qty: 20 TABLET | Refills: 3 | Status: SHIPPED | OUTPATIENT
Start: 2018-12-24 | End: 2019-04-29 | Stop reason: ALTCHOICE

## 2018-12-24 RX ORDER — FAMOTIDINE 10 MG/ML
20 INJECTION INTRAVENOUS ONCE
Status: COMPLETED | OUTPATIENT
Start: 2018-12-24 | End: 2018-12-24

## 2018-12-24 RX ORDER — DIPHENHYDRAMINE HYDROCHLORIDE 50 MG/ML
50 INJECTION INTRAMUSCULAR; INTRAVENOUS ONCE
Status: COMPLETED | OUTPATIENT
Start: 2018-12-24 | End: 2018-12-24

## 2018-12-24 RX ORDER — PALONOSETRON 0.05 MG/ML
0.25 INJECTION, SOLUTION INTRAVENOUS ONCE
Status: COMPLETED | OUTPATIENT
Start: 2018-12-24 | End: 2018-12-24

## 2018-12-24 RX ORDER — LORAZEPAM 2 MG/ML
1 INJECTION INTRAMUSCULAR ONCE AS NEEDED
Status: CANCELLED | OUTPATIENT
Start: 2018-12-24

## 2018-12-24 RX ORDER — DIPHENHYDRAMINE HYDROCHLORIDE 50 MG/ML
50 INJECTION INTRAMUSCULAR; INTRAVENOUS ONCE AS NEEDED
Status: CANCELLED | OUTPATIENT
Start: 2018-12-24

## 2018-12-24 RX ORDER — SODIUM CHLORIDE 9 MG/ML
500 INJECTION, SOLUTION INTRAVENOUS ONCE
Status: COMPLETED | OUTPATIENT
Start: 2018-12-24 | End: 2018-12-24

## 2018-12-24 RX ADMIN — METHYLPREDNISOLONE SODIUM SUCCINATE 125 MG: 125 INJECTION, POWDER, FOR SOLUTION INTRAMUSCULAR; INTRAVENOUS at 11:43

## 2018-12-24 RX ADMIN — FAMOTIDINE 20 MG: 10 INJECTION, SOLUTION INTRAVENOUS at 11:46

## 2018-12-24 RX ADMIN — FLUOROURACIL 3550 MG: 50 INJECTION, SOLUTION INTRAVENOUS at 11:28

## 2018-12-24 RX ADMIN — PALONOSETRON 0.25 MG: 0.05 INJECTION, SOLUTION INTRAVENOUS at 08:33

## 2018-12-24 RX ADMIN — SODIUM CHLORIDE 500 ML: 9 INJECTION, SOLUTION INTRAVENOUS at 11:39

## 2018-12-24 RX ADMIN — DIPHENHYDRAMINE HYDROCHLORIDE 50 MG: 50 INJECTION, SOLUTION INTRAMUSCULAR; INTRAVENOUS at 11:44

## 2018-12-24 RX ADMIN — DEXTROSE MONOHYDRATE 50 ML/HR: 50 INJECTION, SOLUTION INTRAVENOUS at 08:25

## 2018-12-24 RX ADMIN — DEXAMETHASONE SODIUM PHOSPHATE 12 MG: 10 INJECTION, SOLUTION INTRAMUSCULAR; INTRAVENOUS at 08:36

## 2018-12-24 RX ADMIN — OXALIPLATIN 125 MG: 5 INJECTION, SOLUTION INTRAVENOUS at 08:59

## 2018-12-24 NOTE — PROGRESS NOTES
Shama just leaving the lobby after pump connect.  Complaint of shortness of breath.  Nursing staff to lobby.  Pump disconnected.  Vitals obtained.   Oxygen sat 98 Blood pressure 170/80.  Pump disconnected.  NS started.  MBS on scene and orders emergency medications.  Returned patient to treatment room.  Emergency medications administered.  Per Dr. Fierro observe for 30 minutes and restart pump.    Premedication for next treatment called into pharmacy.  Will run any remaining doses of oxaliplatin over 4 hours with a 30 minute observation post.      Per Dr. Fierro restart pump and observe for 30 minutes.  1220 Pump restarted.  No further difficulties.  Escorted to pharmacy to  benadryl tablets.  Reviewed when to seek immediate medical intervention.  Voices understanding with acceptance.

## 2018-12-26 ENCOUNTER — HOSPITAL ENCOUNTER (OUTPATIENT)
Dept: INFUSION THERAPY | Facility: HOSPITAL | Age: 73
Setting detail: INFUSION SERIES
Discharge: HOME | End: 2018-12-26
Payer: MEDICARE

## 2018-12-26 DIAGNOSIS — C18.2 MALIGNANT NEOPLASM OF ASCENDING COLON (CMS/HCC): Primary | ICD-10-CM

## 2018-12-26 DIAGNOSIS — K63.89 MASS OF CECUM: ICD-10-CM

## 2018-12-26 PROCEDURE — 6360000200 HC RX 636 W HCPCS (ALT 250 FOR IP): Performed by: NURSE PRACTITIONER

## 2018-12-26 PROCEDURE — G0463 HOSPITAL OUTPT CLINIC VISIT: HCPCS

## 2018-12-26 RX ORDER — HEPARIN 100 UNIT/ML
500 SYRINGE INTRAVENOUS AS NEEDED
Status: CANCELLED | OUTPATIENT
Start: 2018-12-26

## 2018-12-26 RX ORDER — SODIUM CHLORIDE 0.9 % (FLUSH) 0.9 %
10 SYRINGE (ML) INJECTION AS NEEDED
Status: CANCELLED | OUTPATIENT
Start: 2018-12-26

## 2018-12-26 RX ORDER — SODIUM CHLORIDE 0.9 % (FLUSH) 0.9 %
10 SYRINGE (ML) INJECTION AS NEEDED
Status: DISCONTINUED | OUTPATIENT
Start: 2018-12-26 | End: 2018-12-27 | Stop reason: HOSPADM

## 2018-12-26 RX ORDER — HEPARIN 100 UNIT/ML
500 SYRINGE INTRAVENOUS AS NEEDED
Status: DISCONTINUED | OUTPATIENT
Start: 2018-12-26 | End: 2018-12-27 | Stop reason: HOSPADM

## 2018-12-26 RX ADMIN — HEPARIN 5 ML: 100 SYRINGE at 10:42

## 2018-12-26 RX ADMIN — Medication 10 ML: at 10:42

## 2018-12-26 NOTE — NURSING NOTE
States she had a reaction to probably Ociliplatin just prior to leaving [facility] on Monday.  States she has premeds to take the day prior to chemo 1/07/19.  Reviewed how to take pre meds.   Chemo pump disconnected and port flushed per protocol. Reviewed neutropenia precautions with pt and spouse.

## 2019-01-04 ENCOUNTER — TELEPHONE (OUTPATIENT)
Dept: ONCOLOGY | Facility: CLINIC | Age: 74
End: 2019-01-04

## 2019-01-04 NOTE — PROGRESS NOTES
"Pt calls for clarification of dexamethasone dose prior to chemo on Monday due to previous reaction to oxaliplatin. Prescription from MD states \"take dexamethasone 20mg the night before and the morning of chemo. Take with food.\" Pt states she had other verbal instruction for a.m., but no notes from nurse or provider regarding any other instruction. Discussed with pharmacy and with Siomara PharmD. Order from Dr. Fierro was as written above, pt should take oral dose evening before and morning of. Advised her to discuss with [facility] Monday that she has had extra PO dose of dex prior to treatment. Per pharmacy, she would also get the IV dose. Pt voices understanding.  "

## 2019-01-04 NOTE — PROGRESS NOTES
----- Message from Chloe Amezquita sent at 1/4/2019  8:06 AM MST -----  Regarding: directions  Contact: [phone]/  Patient is confused on the directions for her medication (dexmetaxon sp?).  Script has different instructions than what her verbal instructions were... Please call

## 2019-01-07 ENCOUNTER — APPOINTMENT (OUTPATIENT)
Dept: ONCOLOGY | Facility: CLINIC | Age: 74
End: 2019-01-07
Payer: MEDICARE

## 2019-01-07 ENCOUNTER — INFUSION (OUTPATIENT)
Dept: ONCOLOGY | Facility: CLINIC | Age: 74
End: 2019-01-07
Payer: MEDICARE

## 2019-01-07 ENCOUNTER — OFFICE VISIT (OUTPATIENT)
Dept: ONCOLOGY | Facility: CLINIC | Age: 74
End: 2019-01-07
Payer: MEDICARE

## 2019-01-07 VITALS
TEMPERATURE: 98.1 F | BODY MASS INDEX: 20.2 KG/M2 | HEART RATE: 100 BPM | DIASTOLIC BLOOD PRESSURE: 73 MMHG | WEIGHT: 111.2 LBS | SYSTOLIC BLOOD PRESSURE: 163 MMHG | OXYGEN SATURATION: 96 %

## 2019-01-07 DIAGNOSIS — C18.2 MALIGNANT NEOPLASM OF ASCENDING COLON (CMS/HCC): Primary | ICD-10-CM

## 2019-01-07 DIAGNOSIS — C18.2 MALIGNANT NEOPLASM OF ASCENDING COLON (CMS/HCC): ICD-10-CM

## 2019-01-07 LAB
ALBUMIN SERPL-MCNC: 4.8 G/DL (ref 3.5–5.3)
ALP SERPL-CCNC: 69 U/L (ref 55–142)
ALT SERPL-CCNC: 21 U/L (ref 0–52)
ANION GAP SERPL CALC-SCNC: 11 MMOL/L (ref 3–11)
ANISOCYTOSIS PRESENCE IN BLOOD, ANALYZER: ABNORMAL
AST SERPL-CCNC: 27 U/L (ref 0–39)
BASOPHILS # BLD AUTO: 0 10*3/UL
BASOPHILS NFR BLD AUTO: 1 % (ref 0–2)
BILIRUB SERPL-MCNC: 0.43 MG/DL (ref 0–1.4)
BUN SERPL-MCNC: 13 MG/DL (ref 7–25)
CALCIUM ALBUM COR SERPL-MCNC: 8.9 MG/DL (ref 8.6–10.3)
CALCIUM SERPL-MCNC: 9.5 MG/DL (ref 8.6–10.3)
CHLORIDE SERPL-SCNC: 105 MMOL/L (ref 98–107)
CO2 SERPL-SCNC: 22 MMOL/L (ref 21–32)
CREAT SERPL-MCNC: 0.69 MG/DL (ref 0.6–1.2)
EOSINOPHIL # BLD AUTO: 0 10*3/UL
EOSINOPHIL NFR BLD AUTO: 0 % (ref 0–3)
ERYTHROCYTE [DISTWIDTH] IN BLOOD BY AUTOMATED COUNT: 22.5 % (ref 11.5–14)
GFR SERPL CREATININE-BSD FRML MDRD: 88 ML/MIN/1.73M*2
GLUCOSE SERPL-MCNC: 213 MG/DL (ref 70–105)
HCT VFR BLD AUTO: 42.4 % (ref 34–45)
HGB BLD-MCNC: 13.4 G/DL (ref 11.5–15.5)
HYPOCHROMIA PRESENCE IN BLOOD, ANALYZER: ABNORMAL
LYMPHOCYTES # BLD AUTO: 0.9 10*3/UL
LYMPHOCYTES NFR BLD AUTO: 23 % (ref 11–47)
MCH RBC QN AUTO: 25.9 PG (ref 28–33)
MCHC RBC AUTO-ENTMCNC: 31.7 G/DL (ref 32–36)
MCV RBC AUTO: 81.8 FL (ref 81–97)
MONOCYTES # BLD AUTO: 0 10*3/UL
MONOCYTES NFR BLD AUTO: 1 % (ref 3–11)
NEUTROPHILS # BLD AUTO: 3 10*3/UL
NEUTROPHILS NFR BLD AUTO: 75 % (ref 41–81)
PLATELET # BLD AUTO: 230 10*3/UL (ref 140–350)
PMV BLD AUTO: 7 FL (ref 6.9–10.8)
POTASSIUM SERPL-SCNC: 3.8 MMOL/L (ref 3.5–5.1)
PROT SERPL-MCNC: 7.6 G/DL (ref 6–8.3)
RBC # BLD AUTO: 5.19 10*6/ΜL (ref 3.7–5.3)
SODIUM SERPL-SCNC: 138 MMOL/L (ref 135–145)
WBC # BLD AUTO: 4 10*3/UL (ref 4.5–10.5)

## 2019-01-07 PROCEDURE — S0028 INJECTION, FAMOTIDINE, 20 MG: HCPCS | Performed by: NURSE PRACTITIONER

## 2019-01-07 PROCEDURE — G0463 HOSPITAL OUTPT CLINIC VISIT: HCPCS

## 2019-01-07 PROCEDURE — 2580000300 HC RX 258: Performed by: NURSE PRACTITIONER

## 2019-01-07 PROCEDURE — 2500000200 HC RX 250 WO HCPCS: Performed by: NURSE PRACTITIONER

## 2019-01-07 PROCEDURE — 96415 CHEMO IV INFUSION ADDL HR: CPT

## 2019-01-07 PROCEDURE — 96416 CHEMO PROLONG INFUSE W/PUMP: CPT

## 2019-01-07 PROCEDURE — 80053 COMPREHEN METABOLIC PANEL: CPT

## 2019-01-07 PROCEDURE — 85025 COMPLETE CBC W/AUTO DIFF WBC: CPT

## 2019-01-07 PROCEDURE — 36415 COLL VENOUS BLD VENIPUNCTURE: CPT

## 2019-01-07 PROCEDURE — 99213 OFFICE O/P EST LOW 20 MIN: CPT | Mod: 24 | Performed by: NURSE PRACTITIONER

## 2019-01-07 PROCEDURE — 6360000200 HC RX 636 W HCPCS (ALT 250 FOR IP): Performed by: NURSE PRACTITIONER

## 2019-01-07 PROCEDURE — 96413 CHEMO IV INFUSION 1 HR: CPT

## 2019-01-07 PROCEDURE — 96375 TX/PRO/DX INJ NEW DRUG ADDON: CPT

## 2019-01-07 RX ORDER — PALONOSETRON 0.05 MG/ML
0.25 INJECTION, SOLUTION INTRAVENOUS ONCE
Status: CANCELLED | OUTPATIENT
Start: 2019-01-07

## 2019-01-07 RX ORDER — PALONOSETRON 0.05 MG/ML
0.25 INJECTION, SOLUTION INTRAVENOUS ONCE
Status: COMPLETED | OUTPATIENT
Start: 2019-01-07 | End: 2019-01-07

## 2019-01-07 RX ORDER — LORAZEPAM 2 MG/ML
1 INJECTION INTRAMUSCULAR ONCE AS NEEDED
Status: CANCELLED | OUTPATIENT
Start: 2019-01-07

## 2019-01-07 RX ORDER — DEXTROSE MONOHYDRATE 50 MG/ML
50 INJECTION, SOLUTION INTRAVENOUS AS NEEDED
Status: CANCELLED | OUTPATIENT
Start: 2019-01-07

## 2019-01-07 RX ORDER — DIPHENHYDRAMINE HYDROCHLORIDE 50 MG/ML
50 INJECTION INTRAMUSCULAR; INTRAVENOUS ONCE AS NEEDED
Status: CANCELLED | OUTPATIENT
Start: 2019-01-07

## 2019-01-07 RX ORDER — FAMOTIDINE 10 MG/ML
20 INJECTION INTRAVENOUS ONCE
Status: CANCELLED
Start: 2019-01-07 | End: 2019-01-07

## 2019-01-07 RX ORDER — DIPHENHYDRAMINE HYDROCHLORIDE 50 MG/ML
50 INJECTION INTRAMUSCULAR; INTRAVENOUS ONCE
Status: COMPLETED | OUTPATIENT
Start: 2019-01-07 | End: 2019-01-07

## 2019-01-07 RX ORDER — FAMOTIDINE 10 MG/ML
20 INJECTION INTRAVENOUS ONCE
Status: COMPLETED | OUTPATIENT
Start: 2019-01-07 | End: 2019-01-07

## 2019-01-07 RX ORDER — DEXTROSE MONOHYDRATE 50 MG/ML
50 INJECTION, SOLUTION INTRAVENOUS AS NEEDED
Status: DISCONTINUED | OUTPATIENT
Start: 2019-01-07 | End: 2019-01-07 | Stop reason: HOSPADM

## 2019-01-07 RX ORDER — DIPHENHYDRAMINE HYDROCHLORIDE 50 MG/ML
50 INJECTION INTRAMUSCULAR; INTRAVENOUS ONCE
Status: CANCELLED
Start: 2019-01-07 | End: 2019-01-07

## 2019-01-07 RX ADMIN — DEXAMETHASONE SODIUM PHOSPHATE 20 MG: 10 INJECTION, SOLUTION INTRAMUSCULAR; INTRAVENOUS at 09:58

## 2019-01-07 RX ADMIN — OXALIPLATIN 125 MG: 5 INJECTION, SOLUTION INTRAVENOUS at 10:58

## 2019-01-07 RX ADMIN — DEXTROSE MONOHYDRATE 50 ML/HR: 50 INJECTION, SOLUTION INTRAVENOUS at 09:49

## 2019-01-07 RX ADMIN — DIPHENHYDRAMINE HYDROCHLORIDE 50 MG: 50 INJECTION INTRAMUSCULAR; INTRAVENOUS at 09:54

## 2019-01-07 RX ADMIN — FLUOROURACIL 3550 MG: 50 INJECTION, SOLUTION INTRAVENOUS at 15:33

## 2019-01-07 RX ADMIN — FAMOTIDINE 20 MG: 10 INJECTION, SOLUTION INTRAVENOUS at 09:49

## 2019-01-07 RX ADMIN — PALONOSETRON HYDROCHLORIDE 0.25 MG: 0.25 INJECTION INTRAVENOUS at 09:52

## 2019-01-07 ASSESSMENT — ENCOUNTER SYMPTOMS
VOMITING: 0
SLEEP DISTURBANCE: 0
EXTREMITY WEAKNESS: 0
NERVOUS/ANXIOUS: 0
BLOOD IN STOOL: 0
HEADACHES: 0
DEPRESSION: 0
CHILLS: 0
TROUBLE SWALLOWING: 0
UNEXPECTED WEIGHT CHANGE: 0
VOICE CHANGE: 0
ABDOMINAL DISTENTION: 0
SORE THROAT: 0
SHORTNESS OF BREATH: 0
BRUISES/BLEEDS EASILY: 0
ARTHRALGIAS: 0
WHEEZING: 0
ABDOMINAL PAIN: 0
SEIZURES: 0
EYE PROBLEMS: 0
NUMBNESS: 0
HEMOPTYSIS: 0
DIAPHORESIS: 0
WOUND: 0
CHEST TIGHTNESS: 0
DYSURIA: 0
COUGH: 0
FREQUENCY: 1
FATIGUE: 0
NAUSEA: 0
FEVER: 0
NECK PAIN: 0
LEG SWELLING: 0
DIARRHEA: 1
NECK STIFFNESS: 0
CONSTIPATION: 0
PALPITATIONS: 0
ADENOPATHY: 0
LIGHT-HEADEDNESS: 0
SPEECH DIFFICULTY: 0
FLANK PAIN: 0
DIFFICULTY URINATING: 0
APPETITE CHANGE: 0
BACK PAIN: 0
HEMATURIA: 0
SCLERAL ICTERUS: 0
MYALGIAS: 0
DIZZINESS: 0

## 2019-01-07 NOTE — PROGRESS NOTES
Hematology/Medical Oncology Follow-Up    Patient Name: Shama Caballero  YOB: 1945  Medical Record Number: [id number]    DATE OF SERVICE:   1/7/2019    CHIEF COMPLAINT:  Shama Caballero is a 73 y.o. female who presents today for follow up and treatment of her colon cancer.    ONCOLOGY HISTORY:  The patient was diagnosed with colon cancer in the fall 2018.  She had a history of iron deficiency anemia for approximately 2 years.  In the fall 2018 she felt a lump in her right lower quadrant associated with indigestion.  She had lost approximately 5 pounds in the same timeframe.  Because of her symptoms she was seen and underwent a CT scan which showed the presence of a tumor involving the ascending colon.  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  She underwent resection on October 25, 2018.  Surgical margins were negative, the tumor extended through pericolonic tissues.  There were 19 lymph nodes removed in 1 was metastatic.  Pathologically was a [id number] colon cancer.  After she recovered from surgery she was seen in consultation with Dr. Fierro on November 16.  Because of her high risk for recurrence he recommended adjuvant chemotherapy with FOLFOX over a period of 6 months.  After the first cycle she had neutropenia which required Neupogen and discontinuation of 5-FU injection.  With the second cycle she had a reaction to oxaliplatin requiring steroid premedication the night before and morning of chemotherapy.    HISTORY OF PRESENT ILLNESS:  She is here today to consider cycle three of FOLFOX therapy.  She took her prednisone last evening and this morning.  The only side effect she noted was urinary frequency.  She has a known diabetic but did not check her blood sugars around that time.  She has otherwise done well over the last 2 weeks without any fever, signs of infection or bleeding.      Past Medical  History:   Diagnosis Date   • Blood disorder     anemic   • Complication of anesthesia    • Diabetes mellitus (CMS/HCC) (HCC)    • Hypertension    • Malignant neoplasm of ascending colon (CMS/HCC) (HCC) 10/19/2018   • PONV (postoperative nausea and vomiting)    • Shortness of breath     low iron     Past Surgical History:   Procedure Laterality Date   • CHOLECYSTECTOMY     • COLON SURGERY Right 10/25/2018     LAPAROSCOPIC RIGHT HEMICOLECTOMY- Dr Rodriguez   • COLONOSCOPY N/A 10/19/2018    Dr Rodriguez   • EYE SURGERY Bilateral     CATARACT   • HYSTERECTOMY     • PORTACATH PLACEMENT N/A 11/30/2018    Procedure: PORTACATH INSERTION;  Surgeon: Tremaine Rodriguez MD;  Location: [facility];  Service: General;  Laterality: N/A;   • TONSILLECTOMY       Social History     Socioeconomic History   • Marital status:      Spouse name: Not on file   • Number of children: Not on file   • Years of education: Not on file   • Highest education level: Not on file   Social Needs   • Financial resource strain: Not on file   • Food insecurity - worry: Not on file   • Food insecurity - inability: Not on file   • Transportation needs - medical: Not on file   • Transportation needs - non-medical: Not on file   Occupational History   • Not on file   Tobacco Use   • Smoking status: Never Smoker   • Smokeless tobacco: Never Used   Substance and Sexual Activity   • Alcohol use: Yes     Alcohol/week: 4.2 oz     Types: 7 Glasses of wine per week     Comment: WEEKLY   • Drug use: No   • Sexual activity: Not on file   Other Topics Concern   • Not on file   Social History Narrative   • Not on file      ALLERGIES:     Allergies as of 01/07/2019   • (No Known Allergies)      MEDICATIONS:     Current Outpatient Medications   Medication Sig Dispense Refill   • acetaminophen (TYLENOL) 500 mg tablet Take 2 tablets (1,000 mg total) by mouth every 8 (eight) hours as needed for pain scale 1-3/10.  0   • atorvastatin (LIPITOR) 20 mg tablet TAKE 1  TABLET BY MOUTH EVERY DAY 90 tablet 1   • dexamethasone (DECADRON) 4 mg tablet Take 20 mg PO the night before and morning of Oxaliplatin to prevent reaction. Take with food. 20 tablet 3   • ferrous sulfate (SLOW FE) 142 mg (45 mg iron) tablet extended release Take 1 tablet by mouth daily.     • losartan (COZAAR) 50 mg tablet TAKE 1 TABLET BY MOUTH EVERY DAY 90 tablet 0   • metFORMIN (GLUCOPHAGE) 500 mg tablet TAKE 1 TABLET BY MOUTH TWICE DAILY WITH MEALS 180 tablet 1   • sennosides-docusate sodium (SENNA WITH DOCUSATE SODIUM) 8.6-50 mg Take 1 tablet by mouth 2 (two) times a day as needed for constipation. Please take 1 tab BID while on narcotic pain medication to prevent constipation 30 tablet 0   • blood-glucose meter (ACCU-CHEK JOSE MANUEL) misc FPD 1 0   • cholecalciferol, vitamin D3, (cholecalciferol) 1,000 unit tablet Take 2,000 Units by mouth daily.     • heparin, porcine, PF, 100 unit/mL syringe Infuse 5 mL into a venous catheter as needed (to flush port) Syringe #2 10 mL 11   • lancets (ACCU-CHEK FASTCLIX) [facility] USE BID TO TEST BLOOD SUGAR LEVELS 204 1   • lancing device with lancets (ACCU-CHEK FASTCLIX) kit Use as directed to check blood sugars twice daily. 100 each 3   • lidocaine-prilocaine (EMLA) cream Apply 1 application topically as needed for pain scale 1-3/10 Apply over port site 1 hour before scheduled access 30 g 2   • lidocaine-prilocaine (EMLA) cream Apply thick layer to intact skin over port one hour prior to procedure. Cover with occlusive dressing. 30 g 11   • sodium chloride injection Infuse 10 mL into a venous catheter as needed (to flush port) Syringe #1 20 mL 11     No current facility-administered medications for this visit.      REVIEW OF SYSTEMS:   The ECOG performance status today is .0 - Asymptomatic  Review of Systems   Constitutional: Negative for appetite change, chills, diaphoresis, fatigue, fever and unexpected weight change.   HENT:   Negative for hearing loss, lump/mass, mouth sores,  nosebleeds, sore throat, tinnitus, trouble swallowing and voice change.    Eyes: Negative for eye problems and icterus.   Respiratory: Negative for chest tightness, cough, hemoptysis, shortness of breath and wheezing.    Cardiovascular: Negative for chest pain, leg swelling and palpitations.   Gastrointestinal: Positive for diarrhea (For a few days after chemotherapy ,<3 episodes, relieved by imodium). Negative for abdominal distention, abdominal pain, blood in stool, constipation, nausea and vomiting.   Genitourinary: Positive for frequency (last evening). Negative for difficulty urinating, dysuria and hematuria.    Musculoskeletal: Negative for arthralgias, back pain, flank pain, gait problem, myalgias, neck pain and neck stiffness.   Skin: Negative for itching, rash and wound.        Age spots more apparent  Hair thinning   Neurological: Negative for dizziness, extremity weakness, gait problem, headaches, light-headedness, numbness, seizures and speech difficulty.        Cold sensitivities for up to 7 days   Hematological: Negative for adenopathy. Does not bruise/bleed easily.   Psychiatric/Behavioral: Negative for depression and sleep disturbance. The patient is not nervous/anxious.    All other systems reviewed and are negative.      PHYSICAL EXAM:   /73 (BP Location: Left arm)   Pulse 100   Temp 36.7 °C (98.1 °F)   Wt 50.4 kg (111 lb 3.2 oz)   SpO2 96%   BMI 20.20 kg/m²  Pain 0/10 today.    Physical Exam   Constitutional: She is oriented to person, place, and time. She appears well-developed and well-nourished. No distress.   HENT:   Head: Normocephalic.   Nose: Nose normal.   Mouth/Throat: Oropharynx is clear and moist.   Eyes: Conjunctivae are normal. Right eye exhibits no discharge. Left eye exhibits no discharge. No scleral icterus.   Neck: Neck supple. No thyromegaly present.   Cardiovascular: Normal rate, regular rhythm, normal heart sounds and intact distal pulses. Exam reveals no gallop and  no friction rub.   No murmur heard.  Pulmonary/Chest: Effort normal and breath sounds normal. No respiratory distress. She has no wheezes. She has no rhonchi. She has no rales. She exhibits no tenderness.   Abdominal: Soft. Bowel sounds are normal. She exhibits no distension and no mass. There is no hepatosplenomegaly. There is no tenderness. There is no rebound and no guarding.   Musculoskeletal: Normal range of motion. She exhibits no edema or tenderness.   Lymphadenopathy:        Head (right side): No submental, no submandibular, no tonsillar, no preauricular, no posterior auricular and no occipital adenopathy present.        Head (left side): No submental, no submandibular, no tonsillar, no preauricular, no posterior auricular and no occipital adenopathy present.     She has no cervical adenopathy.     She has no axillary adenopathy.        Right: No inguinal and no supraclavicular adenopathy present.        Left: No inguinal and no supraclavicular adenopathy present.   Neurological: She is alert and oriented to person, place, and time. No cranial nerve deficit.   Skin: Skin is warm and dry. Capillary refill takes less than 2 seconds. No rash noted. No erythema. No pallor.   Right chest wall port well healed   Psychiatric: She has a normal mood and affect. Her behavior is normal. Judgment and thought content normal.       LABORATORY DATA:      Lab Results   Component Value Date    WBC 4.0 (L) 01/07/2019    HGB 13.4 01/07/2019    HCT 42.4 01/07/2019     01/07/2019    RBC 5.19 01/07/2019    MCV 81.8 01/07/2019    MCH 25.9 (L) 01/07/2019    MCHC 31.7 (L) 01/07/2019    RDW 22.5 (H) 01/07/2019    MPV 7.0 01/07/2019    ANC 2.009 12/24/2018    NEUTROABS 3.00 01/07/2019     Lab Results   Component Value Date     01/07/2019    K 3.8 01/07/2019     01/07/2019    CO2 22 01/07/2019    BUN 13 01/07/2019    CREATININE 0.69 01/07/2019    GLUCOSE 213 (H) 01/07/2019    CALCIUM 9.5 01/07/2019    PROT 7.6  01/07/2019    ALBUMIN 4.8 01/07/2019    AST 27 01/07/2019    ALT 21 01/07/2019    ALKPHOS 69 01/07/2019    BILITOT 0.43 01/07/2019       DIAGNOSTIC IMAGING:   None performed today.      IMPRESSION & PLAN:   1.  Colon cancer: She appears to be clinically stable without signs or symptoms of disease based on physical examination laboratory data from today's visit.  With the last cycle she had a reaction to oxaliplatin.  She has been taking prednisone the night before and morning of treatment.  She will continue with this.  Her labs are within acceptable limits for treatment today and she has otherwise tolerated treatment well with minimal side effects.  We will proceed forth with cycle 3 of oxaliplatin and 5-FU infusion.  2.  Hyperglycemia: At baseline blood sugars run between 90 and 135.  Last evening she had urinary frequency, has since resolved she has no other signs or symptoms of urinary tract infection.  We discussed that her steroid premedication pain be increasing her blood sugars which in turn can cause polyuria, polydipsia and polyphagia.  She will monitor these closely and call her primary care provider if she has ongoing blood sugars that are greater than 200.  3.  Follow-up next week for chemotherapy and again in 3 weeks with repeat CBC, CMP labs prior to the next cycle.  Instructed to call with any questions or concerns.        Electronically signed by: DANIKA ORTIZ CNP

## 2019-01-07 NOTE — PROGRESS NOTES
"Oxaliplatin Monitoring  Pt had a history of a reaction post Oxaliplatin infusion.  Monitored VS every 15 minutes, VSS see post assessment  Charting.  Flushed line with 30 cc D5 post infusion.  Checked blood return with NS syringe and connected 5-FU bag at 2 cc/hour.  After about 30 seconds pt stated \"Oh there it starts, I cant see.\" Nurse stated \"You can see?\" and pt replied \"well I can it is just blurry.\"  Checked VS - 141/65, pulse - 94, SpO2 - 98.  Pt denied SOB, chest pain, back pain & feeling anxious.  Pt reported after about 3 minutes that her vision was better.    Rechecked vitals after another 15 min - 136/70, pulse - 101, SpO2 - 98%.  Pt reported that she was feeling \"good.\"  Walked pt around pod and to the bathroom.    After 30 min monitoring pt post infusion pt stated that she felt fine and was ready to go.  Nurse walked pt out to her car, pt tolerated cold without difficulty.    "

## 2019-01-09 ENCOUNTER — HOSPITAL ENCOUNTER (OUTPATIENT)
Dept: INFUSION THERAPY | Facility: HOSPITAL | Age: 74
Setting detail: INFUSION SERIES
Discharge: HOME | End: 2019-01-09
Payer: MEDICARE

## 2019-01-09 DIAGNOSIS — C18.2 MALIGNANT NEOPLASM OF ASCENDING COLON (CMS/HCC): Primary | ICD-10-CM

## 2019-01-09 DIAGNOSIS — K63.89 MASS OF CECUM: ICD-10-CM

## 2019-01-09 PROCEDURE — 6360000200 HC RX 636 W HCPCS (ALT 250 FOR IP)

## 2019-01-09 PROCEDURE — G0463 HOSPITAL OUTPT CLINIC VISIT: HCPCS

## 2019-01-09 RX ORDER — SODIUM CHLORIDE 0.9 % (FLUSH) 0.9 %
10 SYRINGE (ML) INJECTION AS NEEDED
Status: CANCELLED | OUTPATIENT
Start: 2019-01-09

## 2019-01-09 RX ORDER — SODIUM CHLORIDE 0.9 % (FLUSH) 0.9 %
10 SYRINGE (ML) INJECTION AS NEEDED
Status: DISCONTINUED | OUTPATIENT
Start: 2019-01-09 | End: 2019-01-10 | Stop reason: HOSPADM

## 2019-01-09 RX ORDER — HEPARIN 100 UNIT/ML
500 SYRINGE INTRAVENOUS AS NEEDED
Status: CANCELLED | OUTPATIENT
Start: 2019-01-09

## 2019-01-09 RX ORDER — HEPARIN 100 UNIT/ML
500 SYRINGE INTRAVENOUS AS NEEDED
Status: DISCONTINUED | OUTPATIENT
Start: 2019-01-09 | End: 2019-01-10 | Stop reason: HOSPADM

## 2019-01-09 RX ORDER — HEPARIN 100 UNIT/ML
SYRINGE INTRAVENOUS
Status: COMPLETED
Start: 2019-01-09 | End: 2019-01-09

## 2019-01-09 RX ADMIN — HEPARIN 5 ML: 100 SYRINGE at 13:51

## 2019-01-09 RX ADMIN — Medication 20 ML: at 13:50

## 2019-01-10 NOTE — ADDENDUM NOTE
Encounter addended by: Mali Sprague RN on: 1/9/2019 5:28 PM   Actions taken: Charge Capture section accepted

## 2019-01-21 ENCOUNTER — APPOINTMENT (OUTPATIENT)
Dept: ONCOLOGY | Facility: CLINIC | Age: 74
End: 2019-01-21
Payer: MEDICARE

## 2019-01-21 ENCOUNTER — INFUSION (OUTPATIENT)
Dept: ONCOLOGY | Facility: CLINIC | Age: 74
End: 2019-01-21
Payer: MEDICARE

## 2019-01-21 ENCOUNTER — OFFICE VISIT (OUTPATIENT)
Dept: ONCOLOGY | Facility: CLINIC | Age: 74
End: 2019-01-21
Payer: MEDICARE

## 2019-01-21 VITALS
HEART RATE: 104 BPM | SYSTOLIC BLOOD PRESSURE: 140 MMHG | OXYGEN SATURATION: 97 % | BODY MASS INDEX: 19.95 KG/M2 | DIASTOLIC BLOOD PRESSURE: 83 MMHG | WEIGHT: 109.8 LBS | TEMPERATURE: 97.5 F

## 2019-01-21 DIAGNOSIS — C18.2 MALIGNANT NEOPLASM OF ASCENDING COLON (CMS/HCC): Primary | ICD-10-CM

## 2019-01-21 DIAGNOSIS — C18.2 MALIGNANT NEOPLASM OF ASCENDING COLON (CMS/HCC): ICD-10-CM

## 2019-01-21 LAB
ALBUMIN SERPL-MCNC: 4.7 G/DL (ref 3.5–5.3)
ALP SERPL-CCNC: 77 U/L (ref 55–142)
ALT SERPL-CCNC: 57 U/L (ref 0–52)
ANION GAP SERPL CALC-SCNC: 13 MMOL/L (ref 3–11)
ANISOCYTOSIS PRESENCE IN BLOOD, ANALYZER: ABNORMAL
AST SERPL-CCNC: 49 U/L (ref 0–39)
BASOPHILS # BLD AUTO: 0 10*3/UL
BASOPHILS NFR BLD AUTO: 1 % (ref 0–2)
BILIRUB SERPL-MCNC: 0.44 MG/DL (ref 0–1.4)
BUN SERPL-MCNC: 15 MG/DL (ref 7–25)
CALCIUM ALBUM COR SERPL-MCNC: 9.3 MG/DL (ref 8.6–10.3)
CALCIUM SERPL-MCNC: 9.9 MG/DL (ref 8.6–10.3)
CHLORIDE SERPL-SCNC: 104 MMOL/L (ref 98–107)
CO2 SERPL-SCNC: 20 MMOL/L (ref 21–32)
CREAT SERPL-MCNC: 0.67 MG/DL (ref 0.6–1.2)
EOSINOPHIL # BLD AUTO: 0 10*3/UL
EOSINOPHIL NFR BLD AUTO: 0 % (ref 0–3)
ERYTHROCYTE [DISTWIDTH] IN BLOOD BY AUTOMATED COUNT: 23.8 % (ref 11.5–14)
GFR SERPL CREATININE-BSD FRML MDRD: 88 ML/MIN/1.73M*2
GLUCOSE SERPL-MCNC: 275 MG/DL (ref 70–105)
HCT VFR BLD AUTO: 44.7 % (ref 34–45)
HGB BLD-MCNC: 14.1 G/DL (ref 11.5–15.5)
HYPOCHROMIA PRESENCE IN BLOOD, ANALYZER: ABNORMAL
LYMPHOCYTES # BLD AUTO: 0.9 10*3/UL
LYMPHOCYTES NFR BLD AUTO: 12 % (ref 11–47)
MCH RBC QN AUTO: 26.5 PG (ref 28–33)
MCHC RBC AUTO-ENTMCNC: 31.5 G/DL (ref 32–36)
MCV RBC AUTO: 84.1 FL (ref 81–97)
MONOCYTES # BLD AUTO: 0.1 10*3/UL
MONOCYTES NFR BLD AUTO: 1 % (ref 3–11)
NEUTROPHILS # BLD AUTO: 6.4 10*3/UL
NEUTROPHILS NFR BLD AUTO: 86 % (ref 41–81)
PLATELET # BLD AUTO: 137 10*3/UL (ref 140–350)
PMV BLD AUTO: 7.5 FL (ref 6.9–10.8)
POTASSIUM SERPL-SCNC: 4 MMOL/L (ref 3.5–5.1)
PROT SERPL-MCNC: 7.2 G/DL (ref 6–8.3)
RBC # BLD AUTO: 5.32 10*6/ΜL (ref 3.7–5.3)
SODIUM SERPL-SCNC: 137 MMOL/L (ref 135–145)
WBC # BLD AUTO: 7.4 10*3/UL (ref 4.5–10.5)

## 2019-01-21 PROCEDURE — 96413 CHEMO IV INFUSION 1 HR: CPT

## 2019-01-21 PROCEDURE — 96375 TX/PRO/DX INJ NEW DRUG ADDON: CPT

## 2019-01-21 PROCEDURE — G0463 HOSPITAL OUTPT CLINIC VISIT: HCPCS

## 2019-01-21 PROCEDURE — S0028 INJECTION, FAMOTIDINE, 20 MG: HCPCS | Performed by: INTERNAL MEDICINE

## 2019-01-21 PROCEDURE — 85025 COMPLETE CBC W/AUTO DIFF WBC: CPT

## 2019-01-21 PROCEDURE — 99214 OFFICE O/P EST MOD 30 MIN: CPT | Mod: 24 | Performed by: INTERNAL MEDICINE

## 2019-01-21 PROCEDURE — 2580000300 HC RX 258: Performed by: INTERNAL MEDICINE

## 2019-01-21 PROCEDURE — 2500000200 HC RX 250 WO HCPCS: Performed by: INTERNAL MEDICINE

## 2019-01-21 PROCEDURE — 36415 COLL VENOUS BLD VENIPUNCTURE: CPT

## 2019-01-21 PROCEDURE — 96415 CHEMO IV INFUSION ADDL HR: CPT

## 2019-01-21 PROCEDURE — 96416 CHEMO PROLONG INFUSE W/PUMP: CPT

## 2019-01-21 PROCEDURE — 6360000200 HC RX 636 W HCPCS (ALT 250 FOR IP): Mod: JW | Performed by: INTERNAL MEDICINE

## 2019-01-21 PROCEDURE — 80053 COMPREHEN METABOLIC PANEL: CPT

## 2019-01-21 RX ORDER — DIPHENHYDRAMINE HYDROCHLORIDE 50 MG/ML
50 INJECTION INTRAMUSCULAR; INTRAVENOUS ONCE
Status: CANCELLED
Start: 2019-01-21 | End: 2019-01-21

## 2019-01-21 RX ORDER — DIPHENHYDRAMINE HYDROCHLORIDE 50 MG/ML
50 INJECTION INTRAMUSCULAR; INTRAVENOUS ONCE AS NEEDED
Status: CANCELLED | OUTPATIENT
Start: 2019-01-21

## 2019-01-21 RX ORDER — PALONOSETRON 0.05 MG/ML
0.25 INJECTION, SOLUTION INTRAVENOUS ONCE
Status: COMPLETED | OUTPATIENT
Start: 2019-01-21 | End: 2019-01-21

## 2019-01-21 RX ORDER — LORAZEPAM 2 MG/ML
1 INJECTION INTRAMUSCULAR ONCE AS NEEDED
Status: CANCELLED | OUTPATIENT
Start: 2019-01-21

## 2019-01-21 RX ORDER — DEXTROSE MONOHYDRATE 50 MG/ML
50 INJECTION, SOLUTION INTRAVENOUS AS NEEDED
Status: DISCONTINUED | OUTPATIENT
Start: 2019-01-21 | End: 2019-01-21 | Stop reason: HOSPADM

## 2019-01-21 RX ORDER — DIPHENHYDRAMINE HYDROCHLORIDE 50 MG/ML
50 INJECTION INTRAMUSCULAR; INTRAVENOUS ONCE
Status: COMPLETED | OUTPATIENT
Start: 2019-01-21 | End: 2019-01-21

## 2019-01-21 RX ORDER — FAMOTIDINE 10 MG/ML
20 INJECTION INTRAVENOUS ONCE
Status: CANCELLED
Start: 2019-01-21 | End: 2019-01-21

## 2019-01-21 RX ORDER — DEXTROSE MONOHYDRATE 50 MG/ML
50 INJECTION, SOLUTION INTRAVENOUS AS NEEDED
Status: CANCELLED | OUTPATIENT
Start: 2019-01-21

## 2019-01-21 RX ORDER — FAMOTIDINE 10 MG/ML
20 INJECTION INTRAVENOUS ONCE
Status: COMPLETED | OUTPATIENT
Start: 2019-01-21 | End: 2019-01-21

## 2019-01-21 RX ORDER — PALONOSETRON 0.05 MG/ML
0.25 INJECTION, SOLUTION INTRAVENOUS ONCE
Status: CANCELLED | OUTPATIENT
Start: 2019-01-21

## 2019-01-21 RX ADMIN — DIPHENHYDRAMINE HYDROCHLORIDE 50 MG: 50 INJECTION INTRAMUSCULAR; INTRAVENOUS at 10:20

## 2019-01-21 RX ADMIN — OXALIPLATIN 125 MG: 5 INJECTION, SOLUTION, CONCENTRATE INTRAVENOUS at 11:25

## 2019-01-21 RX ADMIN — PALONOSETRON HYDROCHLORIDE 0.25 MG: 0.25 INJECTION INTRAVENOUS at 10:27

## 2019-01-21 RX ADMIN — DEXAMETHASONE SODIUM PHOSPHATE 20 MG: 10 INJECTION, SOLUTION INTRAMUSCULAR; INTRAVENOUS at 10:29

## 2019-01-21 RX ADMIN — FLUOROURACIL 3550 MG: 50 INJECTION, SOLUTION INTRAVENOUS at 16:21

## 2019-01-21 RX ADMIN — FAMOTIDINE 20 MG: 10 INJECTION, SOLUTION INTRAVENOUS at 10:17

## 2019-01-21 RX ADMIN — DEXTROSE MONOHYDRATE 50 ML/HR: 50 INJECTION, SOLUTION INTRAVENOUS at 10:16

## 2019-01-21 ASSESSMENT — PAIN SCALES - GENERAL: PAINLEVEL: 6

## 2019-01-21 NOTE — PROGRESS NOTES
Medical Oncology Follow-Up    Date of Service: 1/21/2019    Subjective   HISTORY OF PRESENT ILLNESS:  This is a 73-year-old female with colon cancer.  The patient returns oncology clinic for follow-up and adjuvant chemotherapy.    The patient was diagnosed with colon cancer in October 2018 when she presented with 2-year history of anemia and more recent history of right lower quadrant abdominal pain.  She initially underwent a colonoscopy which revealed the presence of colon cancer in ascending colon. She underwent resection on October 25, 2018.  Surgical margins were negative, the tumor extended through pericolonic tissues.  There were 19 lymph nodes removed in 1 was metastatic.  Pathologically was a [id number] colon cancer.  After she recovered from surgery, I saw the patient for consultation in November 2018. Because of her high risk for recurrence he recommended adjuvant chemotherapy with FOLFOX over a period of 6 months.  After the first cycle she had neutropenia which required Neupogen and discontinuation of 5-FU injection.  With the second cycle she had a reaction to oxaliplatin requiring steroid premedication the night before and morning of chemotherapy.  Patient so far has received 3 cycles of chemotherapy.    Patient states that with the third cycle of chemotherapy she had some side effects.  Right after the oxaliplatin infusion was completed she experienced a very brief period of temporary blindness.  After 5-FU was started she felt that her tongue was a bit swollen which continued for couple of days without other symptoms such as shortness of breath, skin rash or hives.  She also had diarrhea for couple of days after which her symptoms completely resolved and now she feels back to normal.  She denies any other complaints or abdominal symptoms.              PAST MEDICAL HISTORY:   Past Medical History:   Diagnosis Date   • Blood disorder     anemic   • Complication of anesthesia    • Diabetes mellitus  (CMS/HCC) (HCC)    • Hypertension    • Malignant neoplasm of ascending colon (CMS/HCC) (HCC) 10/19/2018   • PONV (postoperative nausea and vomiting)    • Shortness of breath     low iron        FAMILY HISTORY:   Family History   Problem Relation Age of Onset   • Hypertension Mother    • COPD Mother    • Hypertension Father    • Brain Aneurysm Sister    • Cancer Maternal Grandmother 36        SOCIAL HISTORY:   Social History     Socioeconomic History   • Marital status:      Spouse name: Not on file   • Number of children: Not on file   • Years of education: Not on file   • Highest education level: Not on file   Social Needs   • Financial resource strain: Not on file   • Food insecurity - worry: Not on file   • Food insecurity - inability: Not on file   • Transportation needs - medical: Not on file   • Transportation needs - non-medical: Not on file   Occupational History   • Not on file   Tobacco Use   • Smoking status: Never Smoker   • Smokeless tobacco: Never Used   Substance and Sexual Activity   • Alcohol use: Yes     Alcohol/week: 4.2 oz     Types: 7 Glasses of wine per week     Comment: WEEKLY   • Drug use: No   • Sexual activity: Not on file   Other Topics Concern   • Not on file   Social History Narrative   • Not on file        ALLERGIES:   Allergies as of 01/21/2019   • (No Known Allergies)        MEDICATIONS:   Current Outpatient Medications   Medication Sig Dispense Refill   • acetaminophen (TYLENOL) 500 mg tablet Take 2 tablets (1,000 mg total) by mouth every 8 (eight) hours as needed for pain scale 1-3/10.  0   • atorvastatin (LIPITOR) 20 mg tablet TAKE 1 TABLET BY MOUTH EVERY DAY 90 tablet 1   • blood-glucose meter (ACCU-CHEK JOSE MANUEL) misc FPD 1 0   • cholecalciferol, vitamin D3, (cholecalciferol) 1,000 unit tablet Take 2,000 Units by mouth daily.     • dexamethasone (DECADRON) 4 mg tablet Take 20 mg PO the night before and morning of Oxaliplatin to prevent reaction. Take with food. 20 tablet 3   •  ferrous sulfate (SLOW FE) 142 mg (45 mg iron) tablet extended release Take 1 tablet by mouth daily.     • heparin, porcine, PF, 100 unit/mL syringe Infuse 5 mL into a venous catheter as needed (to flush port) Syringe #2 10 mL 11   • lancets (ACCU-CHEK FASTCLIX) misc USE BID TO TEST BLOOD SUGAR LEVELS 204 1   • lancing device with lancets (ACCU-CHEK FASTCLIX) kit Use as directed to check blood sugars twice daily. 100 each 3   • lidocaine-prilocaine (EMLA) cream Apply 1 application topically as needed for pain scale 1-3/10 Apply over port site 1 hour before scheduled access 30 g 2   • lidocaine-prilocaine (EMLA) cream Apply thick layer to intact skin over port one hour prior to procedure. Cover with occlusive dressing. 30 g 11   • losartan (COZAAR) 50 mg tablet TAKE 1 TABLET BY MOUTH EVERY DAY 90 tablet 0   • metFORMIN (GLUCOPHAGE) 500 mg tablet TAKE 1 TABLET BY MOUTH TWICE DAILY WITH MEALS 180 tablet 1   • sennosides-docusate sodium (SENNA WITH DOCUSATE SODIUM) 8.6-50 mg Take 1 tablet by mouth 2 (two) times a day as needed for constipation. Please take 1 tab BID while on narcotic pain medication to prevent constipation 30 tablet 0   • sodium chloride injection Infuse 10 mL into a venous catheter as needed (to flush port) Syringe #1 20 mL 11     No current facility-administered medications for this visit.        REVIEW OF SYSTEMS:   A 14 point systems review was done.  It was negative with the exception of diarrhea, swollen tongue after chemo.    The ECOG performance status today is 0          Objective    PHYSICAL EXAM:   /83   Pulse 104   Temp 36.4 °C (97.5 °F) (Temporal)   Wt 49.8 kg (109 lb 12.8 oz)   SpO2 97%   BMI 19.95 kg/m²    Body mass index is 19.95 kg/m².     Patient  looked well.  No distress.      HEENT: No scleral icterus.  No oral or pharyngeal lesions     Ln: No lymphadenopathy neck axilla or groin     Lungs: Clear.  No rales, rhonchi or wheezing     CVS: Regular rate and rhythm.  No S3.  No  friction rub     Abdomen: Benign nontender.  No hepatosplenomegaly.  No palpable abnormalities     Ext: No edema.  Pulses intact     Musculoskeletal: No muscle or bone tenderness including spine pelvis     Skin: No petechia purpura rash     Neuro: Alert oriented ×3.  No focal deficits.            Physical Exam    DIAGNOSTIC REPORTS REVIEWED:   CBC today shows a white cell count of 7.4, hemoglobin of 14.1 and platelet count of 137,000.  Comprehensive metabolic panel was normal with the exception of minimal elevation in AST and ALT at 49 and 57 respectively.  Other liver functions were normal.  Glucose was elevated at 275.           Assessment/Plan    IMPRESSION and PLAN:   There is a 73-year-old female with colon cancer.  The patient returns oncology clinic for follow-up and adjuvant chemotherapy.    The patient appears to be doing well overall on adjuvant chemotherapy for her colon cancer.  However there are some concerns.  She may have had an allergic reaction to oxaliplatin with the last cycle although it was not obvious because of heavy premedication with prednisone.  Therefore we will monitor her very closely today.  After she receives oxaliplatin we will wait an hour before starting 5-FU to make sure that she has not reacted to oxaliplatin.  This would also prevent any confusion to which medication she may be reacting.  Provided that we do not observe any type of allergic reaction, our intention is to proceed with her adjuvant chemotherapy as planned.    I spent quite a bit time today with the patient.  We talked about the symptoms she had with the last cycle and I made a number of recommendations.  I also advised her to go to emergency room in case of a significant allergic reaction including swollen tongue difficulty breathing or development of hives or rash.  Once again I reviewed the treatment plan and answered her questions.  The patient verbalized understanding and approval of ongoing therapy and my  recommendations.               Physician: M. BEHNAN SAHIN, MD

## 2019-01-23 ENCOUNTER — HOSPITAL ENCOUNTER (OUTPATIENT)
Dept: INFUSION THERAPY | Facility: HOSPITAL | Age: 74
Setting detail: INFUSION SERIES
Discharge: HOME | End: 2019-01-23
Payer: MEDICARE

## 2019-01-23 VITALS
SYSTOLIC BLOOD PRESSURE: 144 MMHG | DIASTOLIC BLOOD PRESSURE: 60 MMHG | HEART RATE: 84 BPM | RESPIRATION RATE: 16 BRPM | TEMPERATURE: 98.2 F

## 2019-01-23 DIAGNOSIS — C18.2 MALIGNANT NEOPLASM OF ASCENDING COLON (CMS/HCC): Primary | ICD-10-CM

## 2019-01-23 DIAGNOSIS — K63.89 MASS OF CECUM: ICD-10-CM

## 2019-01-23 PROCEDURE — G0463 HOSPITAL OUTPT CLINIC VISIT: HCPCS

## 2019-01-23 PROCEDURE — 6360000200 HC RX 636 W HCPCS (ALT 250 FOR IP): Performed by: NURSE PRACTITIONER

## 2019-01-23 RX ORDER — HEPARIN 100 UNIT/ML
500 SYRINGE INTRAVENOUS AS NEEDED
Status: CANCELLED | OUTPATIENT
Start: 2019-02-04

## 2019-01-23 RX ORDER — SODIUM CHLORIDE 0.9 % (FLUSH) 0.9 %
10 SYRINGE (ML) INJECTION AS NEEDED
Status: CANCELLED | OUTPATIENT
Start: 2019-02-04

## 2019-01-23 RX ORDER — SODIUM CHLORIDE 0.9 % (FLUSH) 0.9 %
10 SYRINGE (ML) INJECTION AS NEEDED
Status: DISCONTINUED | OUTPATIENT
Start: 2019-01-23 | End: 2019-01-24 | Stop reason: HOSPADM

## 2019-01-23 RX ORDER — HEPARIN 100 UNIT/ML
500 SYRINGE INTRAVENOUS AS NEEDED
Status: DISCONTINUED | OUTPATIENT
Start: 2019-01-23 | End: 2019-01-24 | Stop reason: HOSPADM

## 2019-01-23 RX ADMIN — Medication 10 ML: at 15:04

## 2019-01-23 RX ADMIN — HEPARIN 5 ML: 100 SYRINGE at 15:05

## 2019-01-23 ASSESSMENT — PAIN SCALES - GENERAL: PAINLEVEL: 0-NO PAIN

## 2019-02-01 DIAGNOSIS — I10 ESSENTIAL HYPERTENSION: ICD-10-CM

## 2019-02-01 DIAGNOSIS — E78.5 HYPERLIPIDEMIA, UNSPECIFIED HYPERLIPIDEMIA TYPE: ICD-10-CM

## 2019-02-01 RX ORDER — METFORMIN HYDROCHLORIDE 500 MG/1
TABLET ORAL
Qty: 180 TABLET | Refills: 0 | Status: SHIPPED | OUTPATIENT
Start: 2019-02-01 | End: 2019-05-06 | Stop reason: SDUPTHER

## 2019-02-01 RX ORDER — ATORVASTATIN CALCIUM 20 MG/1
TABLET, FILM COATED ORAL
Qty: 90 TABLET | Refills: 0 | Status: SHIPPED | OUTPATIENT
Start: 2019-02-01 | End: 2019-05-06 | Stop reason: SDUPTHER

## 2019-02-04 ENCOUNTER — OFFICE VISIT (OUTPATIENT)
Dept: ONCOLOGY | Facility: CLINIC | Age: 74
End: 2019-02-04
Payer: MEDICARE

## 2019-02-04 ENCOUNTER — INFUSION (OUTPATIENT)
Dept: ONCOLOGY | Facility: CLINIC | Age: 74
End: 2019-02-04
Payer: MEDICARE

## 2019-02-04 ENCOUNTER — APPOINTMENT (OUTPATIENT)
Dept: ONCOLOGY | Facility: CLINIC | Age: 74
End: 2019-02-04
Payer: MEDICARE

## 2019-02-04 VITALS
SYSTOLIC BLOOD PRESSURE: 162 MMHG | OXYGEN SATURATION: 96 % | BODY MASS INDEX: 19.99 KG/M2 | HEART RATE: 100 BPM | DIASTOLIC BLOOD PRESSURE: 89 MMHG | TEMPERATURE: 97.9 F | WEIGHT: 110 LBS

## 2019-02-04 DIAGNOSIS — D50.9 IRON DEFICIENCY ANEMIA, UNSPECIFIED IRON DEFICIENCY ANEMIA TYPE: Primary | ICD-10-CM

## 2019-02-04 DIAGNOSIS — C18.2 MALIGNANT NEOPLASM OF ASCENDING COLON (CMS/HCC): ICD-10-CM

## 2019-02-04 DIAGNOSIS — D50.9 IRON DEFICIENCY ANEMIA, UNSPECIFIED IRON DEFICIENCY ANEMIA TYPE: ICD-10-CM

## 2019-02-04 DIAGNOSIS — C18.2 MALIGNANT NEOPLASM OF ASCENDING COLON (CMS/HCC): Primary | ICD-10-CM

## 2019-02-04 LAB
ALBUMIN SERPL-MCNC: 4.5 G/DL (ref 3.5–5.3)
ALP SERPL-CCNC: 74 U/L (ref 55–142)
ALT SERPL-CCNC: 31 U/L (ref 0–52)
ANION GAP SERPL CALC-SCNC: 9 MMOL/L (ref 3–11)
ANISOCYTOSIS PRESENCE IN BLOOD, ANALYZER: ABNORMAL
AST SERPL-CCNC: 39 U/L (ref 0–39)
BASOPHILS # BLD AUTO: 0.1 10*3/UL
BASOPHILS NFR BLD AUTO: 1 % (ref 0–2)
BILIRUB SERPL-MCNC: 0.46 MG/DL (ref 0–1.4)
BUN SERPL-MCNC: 13 MG/DL (ref 7–25)
CALCIUM ALBUM COR SERPL-MCNC: 9.2 MG/DL (ref 8.6–10.3)
CALCIUM SERPL-MCNC: 9.6 MG/DL (ref 8.6–10.3)
CHLORIDE SERPL-SCNC: 104 MMOL/L (ref 98–107)
CO2 SERPL-SCNC: 25 MMOL/L (ref 21–32)
CREAT SERPL-MCNC: 0.68 MG/DL (ref 0.6–1.2)
EOSINOPHIL # BLD AUTO: 0 10*3/UL
EOSINOPHIL NFR BLD AUTO: 0 % (ref 0–3)
ERYTHROCYTE [DISTWIDTH] IN BLOOD BY AUTOMATED COUNT: 23.7 % (ref 11.5–14)
FERRITIN SERPL-MCNC: 68.9 NG/ML (ref 11–307)
GFR SERPL CREATININE-BSD FRML MDRD: 88 ML/MIN/1.73M*2
GLUCOSE SERPL-MCNC: 226 MG/DL (ref 70–105)
HCT VFR BLD AUTO: 45.1 % (ref 34–45)
HGB BLD-MCNC: 14.1 G/DL (ref 11.5–15.5)
HYPOCHROMIA PRESENCE IN BLOOD, ANALYZER: ABNORMAL
LYMPHOCYTES # BLD AUTO: 0.6 10*3/UL
LYMPHOCYTES NFR BLD AUTO: 11 % (ref 11–47)
MCH RBC QN AUTO: 26.6 PG (ref 28–33)
MCHC RBC AUTO-ENTMCNC: 31.2 G/DL (ref 32–36)
MCV RBC AUTO: 85.3 FL (ref 81–97)
MONOCYTES # BLD AUTO: 0.1 10*3/UL
MONOCYTES NFR BLD AUTO: 1 % (ref 3–11)
NEUTROPHILS # BLD AUTO: 4.4 10*3/UL
NEUTROPHILS NFR BLD AUTO: 86 % (ref 41–81)
PLATELET # BLD AUTO: 139 10*3/UL (ref 140–350)
PMV BLD AUTO: 7.1 FL (ref 6.9–10.8)
POTASSIUM SERPL-SCNC: 4 MMOL/L (ref 3.5–5.1)
PROT SERPL-MCNC: 7.1 G/DL (ref 6–8.3)
RBC # BLD AUTO: 5.29 10*6/ΜL (ref 3.7–5.3)
SODIUM SERPL-SCNC: 138 MMOL/L (ref 135–145)
WBC # BLD AUTO: 5.2 10*3/UL (ref 4.5–10.5)

## 2019-02-04 PROCEDURE — 96416 CHEMO PROLONG INFUSE W/PUMP: CPT

## 2019-02-04 PROCEDURE — 80053 COMPREHEN METABOLIC PANEL: CPT

## 2019-02-04 PROCEDURE — 96415 CHEMO IV INFUSION ADDL HR: CPT

## 2019-02-04 PROCEDURE — G0463 HOSPITAL OUTPT CLINIC VISIT: HCPCS

## 2019-02-04 PROCEDURE — 96368 THER/DIAG CONCURRENT INF: CPT

## 2019-02-04 PROCEDURE — 85025 COMPLETE CBC W/AUTO DIFF WBC: CPT

## 2019-02-04 PROCEDURE — 2580000300 HC RX 258: Performed by: NURSE PRACTITIONER

## 2019-02-04 PROCEDURE — 99214 OFFICE O/P EST MOD 30 MIN: CPT | Performed by: NURSE PRACTITIONER

## 2019-02-04 PROCEDURE — 2500000200 HC RX 250 WO HCPCS: Performed by: NURSE PRACTITIONER

## 2019-02-04 PROCEDURE — 36415 COLL VENOUS BLD VENIPUNCTURE: CPT

## 2019-02-04 PROCEDURE — 82728 ASSAY OF FERRITIN: CPT

## 2019-02-04 PROCEDURE — 6360000200 HC RX 636 W HCPCS (ALT 250 FOR IP): Performed by: NURSE PRACTITIONER

## 2019-02-04 PROCEDURE — 96375 TX/PRO/DX INJ NEW DRUG ADDON: CPT

## 2019-02-04 PROCEDURE — S0028 INJECTION, FAMOTIDINE, 20 MG: HCPCS | Performed by: NURSE PRACTITIONER

## 2019-02-04 PROCEDURE — 96413 CHEMO IV INFUSION 1 HR: CPT

## 2019-02-04 RX ORDER — LORAZEPAM 2 MG/ML
1 INJECTION INTRAMUSCULAR ONCE AS NEEDED
Status: CANCELLED | OUTPATIENT
Start: 2019-02-04

## 2019-02-04 RX ORDER — DIPHENHYDRAMINE HYDROCHLORIDE 50 MG/ML
50 INJECTION INTRAMUSCULAR; INTRAVENOUS ONCE AS NEEDED
Status: CANCELLED | OUTPATIENT
Start: 2019-02-04

## 2019-02-04 RX ORDER — FAMOTIDINE 10 MG/ML
20 INJECTION INTRAVENOUS ONCE
Status: CANCELLED
Start: 2019-02-04 | End: 2019-02-04

## 2019-02-04 RX ORDER — PALONOSETRON 0.05 MG/ML
0.25 INJECTION, SOLUTION INTRAVENOUS ONCE
Status: COMPLETED | OUTPATIENT
Start: 2019-02-04 | End: 2019-02-04

## 2019-02-04 RX ORDER — DIPHENHYDRAMINE HYDROCHLORIDE 50 MG/ML
50 INJECTION INTRAMUSCULAR; INTRAVENOUS ONCE
Status: CANCELLED
Start: 2019-02-04 | End: 2019-02-04

## 2019-02-04 RX ORDER — FAMOTIDINE 10 MG/ML
20 INJECTION INTRAVENOUS ONCE
Status: COMPLETED | OUTPATIENT
Start: 2019-02-04 | End: 2019-02-04

## 2019-02-04 RX ORDER — PALONOSETRON 0.05 MG/ML
0.25 INJECTION, SOLUTION INTRAVENOUS ONCE
Status: CANCELLED | OUTPATIENT
Start: 2019-02-04

## 2019-02-04 RX ORDER — DEXTROSE MONOHYDRATE 50 MG/ML
50 INJECTION, SOLUTION INTRAVENOUS AS NEEDED
Status: DISCONTINUED | OUTPATIENT
Start: 2019-02-04 | End: 2019-02-04 | Stop reason: HOSPADM

## 2019-02-04 RX ORDER — DEXTROSE MONOHYDRATE 50 MG/ML
50 INJECTION, SOLUTION INTRAVENOUS AS NEEDED
Status: CANCELLED | OUTPATIENT
Start: 2019-02-04

## 2019-02-04 RX ORDER — DIPHENHYDRAMINE HYDROCHLORIDE 50 MG/ML
50 INJECTION INTRAMUSCULAR; INTRAVENOUS ONCE
Status: COMPLETED | OUTPATIENT
Start: 2019-02-04 | End: 2019-02-04

## 2019-02-04 RX ADMIN — FAMOTIDINE 20 MG: 10 INJECTION, SOLUTION INTRAVENOUS at 09:31

## 2019-02-04 RX ADMIN — DIPHENHYDRAMINE HYDROCHLORIDE 50 MG: 50 INJECTION INTRAMUSCULAR; INTRAVENOUS at 09:32

## 2019-02-04 RX ADMIN — DEXTROSE MONOHYDRATE 50 ML/HR: 50 INJECTION, SOLUTION INTRAVENOUS at 09:25

## 2019-02-04 RX ADMIN — PALONOSETRON HYDROCHLORIDE 0.25 MG: 0.25 INJECTION INTRAVENOUS at 09:28

## 2019-02-04 RX ADMIN — FLUOROURACIL 3550 MG: 50 INJECTION, SOLUTION INTRAVENOUS at 15:26

## 2019-02-04 RX ADMIN — OXALIPLATIN 125 MG: 5 INJECTION, SOLUTION, CONCENTRATE INTRAVENOUS at 10:03

## 2019-02-04 RX ADMIN — DEXAMETHASONE SODIUM PHOSPHATE 20 MG: 10 INJECTION, SOLUTION INTRAMUSCULAR; INTRAVENOUS at 09:38

## 2019-02-04 ASSESSMENT — ENCOUNTER SYMPTOMS
ABDOMINAL DISTENTION: 0
FLANK PAIN: 0
LEG SWELLING: 0
MYALGIAS: 0
EXTREMITY WEAKNESS: 0
DEPRESSION: 0
ARTHRALGIAS: 0
NERVOUS/ANXIOUS: 0
ADENOPATHY: 0
ABDOMINAL PAIN: 0
BLOOD IN STOOL: 0
FREQUENCY: 0
COUGH: 0
NUMBNESS: 0
BRUISES/BLEEDS EASILY: 0
TROUBLE SWALLOWING: 0
DIFFICULTY URINATING: 0
NAUSEA: 0
HEMOPTYSIS: 0
WOUND: 0
PALPITATIONS: 0
CONSTIPATION: 0
NECK STIFFNESS: 0
VOICE CHANGE: 0
LIGHT-HEADEDNESS: 0
DIAPHORESIS: 0
SORE THROAT: 0
DIARRHEA: 1
VOMITING: 0
CHEST TIGHTNESS: 0
NECK PAIN: 0
EYE PROBLEMS: 1
HEMATURIA: 0
DIZZINESS: 0
WHEEZING: 0
SPEECH DIFFICULTY: 0
APPETITE CHANGE: 0
BACK PAIN: 0
HEADACHES: 1
SEIZURES: 0
FEVER: 0
DYSURIA: 0
CHILLS: 0
SLEEP DISTURBANCE: 0
SHORTNESS OF BREATH: 0
FATIGUE: 0
SCLERAL ICTERUS: 0
UNEXPECTED WEIGHT CHANGE: 0

## 2019-02-04 NOTE — PROGRESS NOTES
Hematology/Medical Oncology Follow-Up    Patient Name: Shama Caballero  YOB: 1945  Medical Record Number: [id number]    DATE OF SERVICE:   2/4/2019    CHIEF COMPLAINT:  Shama Caballero is a 73 y.o. female who presents today for follow up and treatment of her colon cancer.    ONCOLOGY HISTORY:  The patient was diagnosed with colon cancer in the fall 2018.  She had a history of iron deficiency anemia for approximately 2 years.  In the fall 2018 she felt a lump in her right lower quadrant associated with indigestion.  She had lost approximately 5 pounds in the same timeframe.  Because of her symptoms she was seen and underwent a CT scan which showed the presence of a tumor involving the ascending colon.  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  She underwent resection on October 25, 2018.  Surgical margins were negative, the tumor extended through pericolonic tissues.  There were 19 lymph nodes removed in 1 was metastatic.  Pathologically was a [id number] colon cancer.  After she recovered from surgery she was seen in consultation with Dr. Fierro on November 16.  Because of her high risk for recurrence he recommended adjuvant chemotherapy with FOLFOX over a period of 6 months.  After the first cycle she had neutropenia which required Neupogen and discontinuation of 5-FU injection.  With the second cycle she had a reaction to oxaliplatin requiring steroid premedication the night before and morning of chemotherapy.    HISTORY OF PRESENT ILLNESS:  She is here to consider another cycle of FOLFOX. She has done well over the last two weeks withot any new issues. She tolerated oxaliplatin better last cycle with only mild blurry vision. BP's at home 102-140/69-79. Log has been scanned into the system.  She is wondering if we can recheck her ferritin to see if she can take her iron less frequency.      Past Medical  History:   Diagnosis Date   • Blood disorder     anemic   • Complication of anesthesia    • Diabetes mellitus (CMS/HCC) (HCC)    • Hypertension    • Malignant neoplasm of ascending colon (CMS/HCC) (HCC) 10/19/2018   • PONV (postoperative nausea and vomiting)    • Shortness of breath     low iron     Past Surgical History:   Procedure Laterality Date   • CHOLECYSTECTOMY     • COLON SURGERY Right 10/25/2018     LAPAROSCOPIC RIGHT HEMICOLECTOMY- Dr Rodriguez   • COLONOSCOPY N/A 10/19/2018    Dr Rodriguez   • EYE SURGERY Bilateral     CATARACT   • HYSTERECTOMY     • PORTACATH PLACEMENT N/A 11/30/2018    Procedure: PORTACATH INSERTION;  Surgeon: Tremaine Rodriguez MD;  Location: [facility];  Service: General;  Laterality: N/A;   • TONSILLECTOMY       Social History     Socioeconomic History   • Marital status:      Spouse name: Not on file   • Number of children: Not on file   • Years of education: Not on file   • Highest education level: Not on file   Social Needs   • Financial resource strain: Not on file   • Food insecurity - worry: Not on file   • Food insecurity - inability: Not on file   • Transportation needs - medical: Not on file   • Transportation needs - non-medical: Not on file   Occupational History   • Not on file   Tobacco Use   • Smoking status: Never Smoker   • Smokeless tobacco: Never Used   Substance and Sexual Activity   • Alcohol use: Yes     Alcohol/week: 4.2 oz     Types: 7 Glasses of wine per week     Comment: WEEKLY   • Drug use: No   • Sexual activity: Not on file   Other Topics Concern   • Not on file   Social History Narrative   • Not on file      ALLERGIES:     Allergies as of 02/04/2019   • (No Known Allergies)      MEDICATIONS:     Current Outpatient Medications   Medication Sig Dispense Refill   • acetaminophen (TYLENOL) 500 mg tablet Take 2 tablets (1,000 mg total) by mouth every 8 (eight) hours as needed for pain scale 1-3/10.  0   • atorvastatin (LIPITOR) 20 mg tablet TAKE 1  TABLET BY MOUTH EVERY DAY 90 tablet 0   • blood-glucose meter (ACCU-CHEK JOSE MANUEL) misc FPD 1 0   • cholecalciferol, vitamin D3, (cholecalciferol) 1,000 unit tablet Take 2,000 Units by mouth daily.     • dexamethasone (DECADRON) 4 mg tablet Take 20 mg PO the night before and morning of Oxaliplatin to prevent reaction. Take with food. 20 tablet 3   • ferrous sulfate (SLOW FE) 142 mg (45 mg iron) tablet extended release Take 1 tablet by mouth daily.     • heparin, porcine, PF, 100 unit/mL syringe Infuse 5 mL into a venous catheter as needed (to flush port) Syringe #2 10 mL 11   • lancets (ACCU-CHEK FASTCLIX) [facility] USE BID TO TEST BLOOD SUGAR LEVELS 204 1   • lancing device with lancets (ACCU-CHEK FASTCLIX) kit Use as directed to check blood sugars twice daily. 100 each 3   • lidocaine-prilocaine (EMLA) cream Apply 1 application topically as needed for pain scale 1-3/10 Apply over port site 1 hour before scheduled access 30 g 2   • lidocaine-prilocaine (EMLA) cream Apply thick layer to intact skin over port one hour prior to procedure. Cover with occlusive dressing. 30 g 11   • losartan (COZAAR) 50 mg tablet TAKE 1 TABLET BY MOUTH EVERY DAY 90 tablet 0   • metFORMIN (GLUCOPHAGE) 500 mg tablet TAKE 1 TABLET BY MOUTH TWICE DAILY WITH MEALS 180 tablet 0   • sennosides-docusate sodium (SENNA WITH DOCUSATE SODIUM) 8.6-50 mg Take 1 tablet by mouth 2 (two) times a day as needed for constipation. Please take 1 tab BID while on narcotic pain medication to prevent constipation 30 tablet 0   • sodium chloride injection Infuse 10 mL into a venous catheter as needed (to flush port) Syringe #1 20 mL 11     No current facility-administered medications for this visit.      Facility-Administered Medications Ordered in Other Visits   Medication Dose Route Frequency Provider Last Rate Last Dose   • D5W continuous infusion  50 mL/hr intravenous PRN Suri Bee CNP 50 mL/hr at 02/04/19 0925 50 mL/hr at 02/04/19 0925   • dexamethasone  (DECADRON) 20 mg in D5W 52 mL IVPB  20 mg intravenous Once Suri Bee  mL/hr at 02/04/19 0938 20 mg at 02/04/19 0938   • fluorouracil (ADRUCIL) 3,550 mg in normal saline 92 mL chemo pump  2,400 mg/m2 (Treatment Plan Recorded) intravenous Over 46 hr Suri Bee CNP       • oxaliplatin (ELOXATIN) 125 mg in D5W 275 mL chemo IVPB  85 mg/m2 (Treatment Plan Recorded) intravenous Once Suri Bee CNP         REVIEW OF SYSTEMS:   The ECOG performance status today is .0 - Asymptomatic  Review of Systems   Constitutional: Negative for appetite change, chills, diaphoresis, fatigue, fever and unexpected weight change.   HENT:   Positive for mouth sores. Negative for hearing loss, lump/mass, nosebleeds, sore throat, tinnitus, trouble swallowing and voice change.         Taste changes, sore/numb tongue-resolved last Wednesday.   Eyes: Positive for eye problems (blurred vision after oxaliplatin, improving). Negative for icterus.   Respiratory: Negative for chest tightness, cough, hemoptysis, shortness of breath and wheezing.    Cardiovascular: Negative for chest pain, leg swelling and palpitations.   Gastrointestinal: Positive for diarrhea (improved since last cycle). Negative for abdominal distention, abdominal pain, blood in stool, constipation, nausea and vomiting.   Genitourinary: Negative for difficulty urinating, dysuria, frequency and hematuria.    Musculoskeletal: Negative for arthralgias, back pain, flank pain, gait problem, myalgias, neck pain and neck stiffness.   Skin: Negative for itching, rash and wound.   Neurological: Positive for headaches. Negative for dizziness, extremity weakness, gait problem, light-headedness, numbness, seizures and speech difficulty.        Cold sensitivities for up to 7 days   Hematological: Negative for adenopathy. Does not bruise/bleed easily.   Psychiatric/Behavioral: Negative for depression and sleep disturbance. The patient is not nervous/anxious.    All other  systems reviewed and are negative.      PHYSICAL EXAM:   /89 (BP Location: Left arm)   Pulse 100   Temp 36.6 °C (97.9 °F) (Oral)   Wt 49.9 kg (110 lb)   SpO2 96%   BMI 19.99 kg/m²  Pain 5/10 today, headache. Worse after steroids    Physical Exam   Constitutional: She is oriented to person, place, and time. She appears well-developed and well-nourished. No distress.   HENT:   Head: Normocephalic.   Nose: Nose normal.   Mouth/Throat: Oropharynx is clear and moist.   Eyes: Conjunctivae are normal. Right eye exhibits no discharge. Left eye exhibits no discharge. No scleral icterus.   Neck: Neck supple. No thyromegaly present.   Cardiovascular: Normal rate, regular rhythm, normal heart sounds and intact distal pulses. Exam reveals no gallop and no friction rub.   No murmur heard.  Pulmonary/Chest: Effort normal and breath sounds normal. No respiratory distress. She has no wheezes. She has no rhonchi. She has no rales. She exhibits no tenderness.   Abdominal: Soft. Bowel sounds are normal. She exhibits no distension and no mass. There is no hepatosplenomegaly. There is no tenderness. There is no rebound and no guarding.   Musculoskeletal: Normal range of motion. She exhibits no edema or tenderness.   Lymphadenopathy:        Head (right side): No submental, no submandibular, no tonsillar, no preauricular, no posterior auricular and no occipital adenopathy present.        Head (left side): No submental, no submandibular, no tonsillar, no preauricular, no posterior auricular and no occipital adenopathy present.     She has no cervical adenopathy.     She has no axillary adenopathy.        Right: No inguinal and no supraclavicular adenopathy present.        Left: No inguinal and no supraclavicular adenopathy present.   Neurological: She is alert and oriented to person, place, and time. No cranial nerve deficit.   Skin: Skin is warm and dry. Capillary refill takes less than 2 seconds. No rash noted. No erythema. No  pallor.   Right chest wall port well healed   Psychiatric: She has a normal mood and affect. Her behavior is normal. Judgment and thought content normal.       LABORATORY DATA:      Lab Results   Component Value Date    WBC 5.2 02/04/2019    HGB 14.1 02/04/2019    HCT 45.1 (H) 02/04/2019     (L) 02/04/2019    RBC 5.29 02/04/2019    MCV 85.3 02/04/2019    MCH 26.6 (L) 02/04/2019    MCHC 31.2 (L) 02/04/2019    RDW 23.7 (H) 02/04/2019    MPV 7.1 02/04/2019    ANC 2.009 12/24/2018    NEUTROABS 4.40 02/04/2019     Lab Results   Component Value Date     02/04/2019    K 4.0 02/04/2019     02/04/2019    CO2 25 02/04/2019    BUN 13 02/04/2019    CREATININE 0.68 02/04/2019    GLUCOSE 226 (H) 02/04/2019    CALCIUM 9.6 02/04/2019    PROT 7.1 02/04/2019    ALBUMIN 4.5 02/04/2019    AST 39 02/04/2019    ALT 31 02/04/2019    ALKPHOS 74 02/04/2019    BILITOT 0.46 02/04/2019     Component      Latest Ref Rng & Units 2/4/2019   Ferritin      11.0 - 307.0 ng/mL 68.9       DIAGNOSTIC IMAGING:   None performed today.      IMPRESSION & PLAN:   1.  Colon cancer: She appears to be clinically stable without signs or symptoms of disease based on physical examination laboratory data from today's visit. She tolerated the last cycle well.  Her labs are stable and in acceptable limits for treatment today.  She will continue with premedications prior to oxaliplatin. Will proceed with cycle 5 of oxaliplatin and 5-FU infusion.  2.  Hypertension: I reviewed the last 3-4 weeks of her blood pressures at home and these have been good, I have scanned a copy into the system.   3.  Hyperglycemia: This is stable, blood sugars at home have been in the 80s to low 100s.  As above these are on her log and will be scanned into the system.  4. Iron deficiency anemia: Rechecking ferritin, this is 68.9.  We discussed that if she wanted to try taking her iron supplements 3 times a week rather than 7 she could do this.  We will continue to monitor  closely.  5.  Follow-up in 2 weeks prior to the next cycle with repeat labs.  Instructed to call with questions or concerns.      Electronically signed by: DANIKA ORTIZ CNP

## 2019-02-04 NOTE — PROGRESS NOTES
"PT in today for treatment. Pre medications as ordered. PT states she took pre medications for reactions prior to coming to [facility]. Ran Oxaliplatin over 4 hours. Approximately 20 minutes into 1 hour wait after Chemo (1420) (prior to pump connect) pt states \"all of a sudden her eyelids closed her vision comes and goes and it is very blurry.\" /66 pulse 100 O2 96%. Pt denies any SOB, CP or other symptoms.  PT states when this happened on her 2nd dose. She was not given any extra meds and her vision slowly got better.   1427 /66 O2 95% p 103 - \"eyelids are still heavy and her vision is still blurry.\" Notified Suri Bee CNP   1432 PT states blurriness is \"a little better\"  1438 \"Right eyelid heavier than left, vision is blurry but still getting better\" /63 p101 O2 96% (pt denies any SOB, swelling, or CP)  1445 vision continuing to slowly get less blurry. Continue to monitor.   1500 \"vision is slowly getting better\"  1515 PT states vision is still slowly getting better not quite to normal\"  1526 Pt states \"vision is much better but blurriness is not 100% gone.\" PT would like pump placed and to go home. Notified Suri Bee CNP. Okay to place pump and watch patient for another 10-15 minutes.   1545 Pt states she is doing okay. PT walked to bathroom with no difficulties. She states vision is still getting better and she would like to go home. Discussed if her vision gets worse or symptoms return she is to call right away if [facility] is open If not go to ER. Pt and  states understanding. KM okay with patient going home if she calls with worsening symptoms. PT walked with  to car without difficulty. Trey RN      "

## 2019-02-06 ENCOUNTER — HOSPITAL ENCOUNTER (OUTPATIENT)
Dept: INFUSION THERAPY | Facility: HOSPITAL | Age: 74
Setting detail: INFUSION SERIES
Discharge: HOME | End: 2019-02-06
Payer: MEDICARE

## 2019-02-06 VITALS
TEMPERATURE: 98.8 F | DIASTOLIC BLOOD PRESSURE: 53 MMHG | SYSTOLIC BLOOD PRESSURE: 131 MMHG | RESPIRATION RATE: 20 BRPM | HEART RATE: 89 BPM

## 2019-02-06 DIAGNOSIS — K63.89 MASS OF CECUM: ICD-10-CM

## 2019-02-06 DIAGNOSIS — C18.2 MALIGNANT NEOPLASM OF ASCENDING COLON (CMS/HCC): Primary | ICD-10-CM

## 2019-02-06 PROCEDURE — G0463 HOSPITAL OUTPT CLINIC VISIT: HCPCS

## 2019-02-06 PROCEDURE — 96523 IRRIG DRUG DELIVERY DEVICE: CPT

## 2019-02-06 PROCEDURE — 6360000200 HC RX 636 W HCPCS (ALT 250 FOR IP): Performed by: NURSE PRACTITIONER

## 2019-02-06 RX ORDER — SODIUM CHLORIDE 0.9 % (FLUSH) 0.9 %
10 SYRINGE (ML) INJECTION AS NEEDED
Status: CANCELLED | OUTPATIENT
Start: 2019-02-06

## 2019-02-06 RX ORDER — SODIUM CHLORIDE 0.9 % (FLUSH) 0.9 %
10 SYRINGE (ML) INJECTION AS NEEDED
Status: DISCONTINUED | OUTPATIENT
Start: 2019-02-06 | End: 2019-02-07 | Stop reason: HOSPADM

## 2019-02-06 RX ORDER — HEPARIN 100 UNIT/ML
500 SYRINGE INTRAVENOUS AS NEEDED
Status: CANCELLED | OUTPATIENT
Start: 2019-02-06

## 2019-02-06 RX ORDER — HEPARIN 100 UNIT/ML
500 SYRINGE INTRAVENOUS AS NEEDED
Status: DISCONTINUED | OUTPATIENT
Start: 2019-02-06 | End: 2019-02-07 | Stop reason: HOSPADM

## 2019-02-06 RX ADMIN — Medication 20 ML: at 13:33

## 2019-02-06 RX ADMIN — HEPARIN 5 ML: 100 SYRINGE at 13:33

## 2019-02-06 ASSESSMENT — PAIN SCALES - GENERAL: PAINLEVEL: 0-NO PAIN

## 2019-02-18 ENCOUNTER — INFUSION (OUTPATIENT)
Dept: ONCOLOGY | Facility: CLINIC | Age: 74
End: 2019-02-18
Payer: MEDICARE

## 2019-02-18 ENCOUNTER — CLINICAL SUPPORT (OUTPATIENT)
Dept: ONCOLOGY | Facility: CLINIC | Age: 74
End: 2019-02-18
Payer: MEDICARE

## 2019-02-18 ENCOUNTER — APPOINTMENT (OUTPATIENT)
Dept: ONCOLOGY | Facility: CLINIC | Age: 74
End: 2019-02-18
Payer: MEDICARE

## 2019-02-18 ENCOUNTER — OFFICE VISIT (OUTPATIENT)
Dept: ONCOLOGY | Facility: CLINIC | Age: 74
End: 2019-02-18
Payer: MEDICARE

## 2019-02-18 VITALS
TEMPERATURE: 98 F | HEART RATE: 100 BPM | OXYGEN SATURATION: 96 % | WEIGHT: 111 LBS | DIASTOLIC BLOOD PRESSURE: 69 MMHG | SYSTOLIC BLOOD PRESSURE: 157 MMHG | BODY MASS INDEX: 20.17 KG/M2

## 2019-02-18 DIAGNOSIS — C18.2 MALIGNANT NEOPLASM OF ASCENDING COLON (CMS/HCC): ICD-10-CM

## 2019-02-18 DIAGNOSIS — C18.2 MALIGNANT NEOPLASM OF ASCENDING COLON (CMS/HCC): Primary | ICD-10-CM

## 2019-02-18 LAB
ALBUMIN SERPL-MCNC: 4.2 G/DL (ref 3.5–5.3)
ALP SERPL-CCNC: 82 U/L (ref 55–142)
ALT SERPL-CCNC: 28 U/L (ref 0–52)
ANION GAP SERPL CALC-SCNC: 11 MMOL/L (ref 3–11)
ANISOCYTOSIS PRESENCE IN BLOOD, ANALYZER: ABNORMAL
AST SERPL-CCNC: 35 U/L (ref 0–39)
BASOPHILS # BLD AUTO: 0.1 10*3/UL
BASOPHILS NFR BLD AUTO: 1 % (ref 0–2)
BILIRUB SERPL-MCNC: 0.55 MG/DL (ref 0–1.4)
BUN SERPL-MCNC: 18 MG/DL (ref 7–25)
CALCIUM ALBUM COR SERPL-MCNC: 9.6 MG/DL (ref 8.6–10.3)
CALCIUM SERPL-MCNC: 9.8 MG/DL (ref 8.6–10.3)
CHLORIDE SERPL-SCNC: 104 MMOL/L (ref 98–107)
CO2 SERPL-SCNC: 23 MMOL/L (ref 21–32)
CREAT SERPL-MCNC: 0.69 MG/DL (ref 0.6–1.2)
EOSINOPHIL # BLD AUTO: 0 10*3/UL
EOSINOPHIL NFR BLD AUTO: 0 % (ref 0–3)
ERYTHROCYTE [DISTWIDTH] IN BLOOD BY AUTOMATED COUNT: 23.3 % (ref 11.5–14)
GFR SERPL CREATININE-BSD FRML MDRD: 88 ML/MIN/1.73M*2
GLUCOSE SERPL-MCNC: 307 MG/DL (ref 70–105)
HCT VFR BLD AUTO: 43.9 % (ref 34–45)
HGB BLD-MCNC: 13.9 G/DL (ref 11.5–15.5)
LYMPHOCYTES # BLD AUTO: 0.6 10*3/UL
LYMPHOCYTES NFR BLD AUTO: 12 % (ref 11–47)
MCH RBC QN AUTO: 27.2 PG (ref 28–33)
MCHC RBC AUTO-ENTMCNC: 31.8 G/DL (ref 32–36)
MCV RBC AUTO: 85.6 FL (ref 81–97)
MONOCYTES # BLD AUTO: 0.1 10*3/UL
MONOCYTES NFR BLD AUTO: 1 % (ref 3–11)
NEUTROPHILS # BLD AUTO: 4.4 10*3/UL
NEUTROPHILS NFR BLD AUTO: 85 % (ref 41–81)
PLATELET # BLD AUTO: 133 10*3/UL (ref 140–350)
PMV BLD AUTO: 7.5 FL (ref 6.9–10.8)
POTASSIUM SERPL-SCNC: 3.6 MMOL/L (ref 3.5–5.1)
PROT SERPL-MCNC: 6.9 G/DL (ref 6–8.3)
RBC # BLD AUTO: 5.13 10*6/ΜL (ref 3.7–5.3)
SODIUM SERPL-SCNC: 138 MMOL/L (ref 135–145)
WBC # BLD AUTO: 5.2 10*3/UL (ref 4.5–10.5)

## 2019-02-18 PROCEDURE — 2580000300 HC RX 258: Performed by: INTERNAL MEDICINE

## 2019-02-18 PROCEDURE — S0028 INJECTION, FAMOTIDINE, 20 MG: HCPCS | Performed by: INTERNAL MEDICINE

## 2019-02-18 PROCEDURE — 99214 OFFICE O/P EST MOD 30 MIN: CPT | Performed by: INTERNAL MEDICINE

## 2019-02-18 PROCEDURE — G0463 HOSPITAL OUTPT CLINIC VISIT: HCPCS

## 2019-02-18 PROCEDURE — 6360000200 HC RX 636 W HCPCS (ALT 250 FOR IP): Mod: JW | Performed by: INTERNAL MEDICINE

## 2019-02-18 PROCEDURE — 36415 COLL VENOUS BLD VENIPUNCTURE: CPT

## 2019-02-18 PROCEDURE — 2500000200 HC RX 250 WO HCPCS: Performed by: INTERNAL MEDICINE

## 2019-02-18 PROCEDURE — 96415 CHEMO IV INFUSION ADDL HR: CPT

## 2019-02-18 PROCEDURE — 96375 TX/PRO/DX INJ NEW DRUG ADDON: CPT

## 2019-02-18 PROCEDURE — 85025 COMPLETE CBC W/AUTO DIFF WBC: CPT

## 2019-02-18 PROCEDURE — 96416 CHEMO PROLONG INFUSE W/PUMP: CPT

## 2019-02-18 PROCEDURE — 96413 CHEMO IV INFUSION 1 HR: CPT

## 2019-02-18 PROCEDURE — 80053 COMPREHEN METABOLIC PANEL: CPT

## 2019-02-18 RX ORDER — FAMOTIDINE 10 MG/ML
20 INJECTION INTRAVENOUS ONCE
Status: COMPLETED | OUTPATIENT
Start: 2019-02-18 | End: 2019-02-18

## 2019-02-18 RX ORDER — DIPHENHYDRAMINE HYDROCHLORIDE 50 MG/ML
50 INJECTION INTRAMUSCULAR; INTRAVENOUS ONCE
Status: CANCELLED
Start: 2019-02-18 | End: 2019-02-18

## 2019-02-18 RX ORDER — DIPHENHYDRAMINE HYDROCHLORIDE 50 MG/ML
50 INJECTION INTRAMUSCULAR; INTRAVENOUS ONCE AS NEEDED
Status: CANCELLED | OUTPATIENT
Start: 2019-02-18

## 2019-02-18 RX ORDER — PALONOSETRON 0.05 MG/ML
0.25 INJECTION, SOLUTION INTRAVENOUS ONCE
Status: CANCELLED | OUTPATIENT
Start: 2019-02-18

## 2019-02-18 RX ORDER — DEXTROSE MONOHYDRATE 50 MG/ML
50 INJECTION, SOLUTION INTRAVENOUS AS NEEDED
Status: DISCONTINUED | OUTPATIENT
Start: 2019-02-18 | End: 2019-02-18 | Stop reason: HOSPADM

## 2019-02-18 RX ORDER — FAMOTIDINE 10 MG/ML
20 INJECTION INTRAVENOUS ONCE
Status: CANCELLED
Start: 2019-02-18 | End: 2019-02-18

## 2019-02-18 RX ORDER — PALONOSETRON 0.05 MG/ML
0.25 INJECTION, SOLUTION INTRAVENOUS ONCE
Status: COMPLETED | OUTPATIENT
Start: 2019-02-18 | End: 2019-02-18

## 2019-02-18 RX ORDER — DIPHENHYDRAMINE HYDROCHLORIDE 50 MG/ML
50 INJECTION INTRAMUSCULAR; INTRAVENOUS ONCE
Status: COMPLETED | OUTPATIENT
Start: 2019-02-18 | End: 2019-02-18

## 2019-02-18 RX ORDER — DEXTROSE MONOHYDRATE 50 MG/ML
50 INJECTION, SOLUTION INTRAVENOUS AS NEEDED
Status: CANCELLED | OUTPATIENT
Start: 2019-02-18

## 2019-02-18 RX ORDER — LORAZEPAM 2 MG/ML
1 INJECTION INTRAMUSCULAR ONCE AS NEEDED
Status: CANCELLED | OUTPATIENT
Start: 2019-02-18

## 2019-02-18 RX ADMIN — DIPHENHYDRAMINE HYDROCHLORIDE 50 MG: 50 INJECTION, SOLUTION INTRAMUSCULAR; INTRAVENOUS at 10:41

## 2019-02-18 RX ADMIN — PALONOSETRON HYDROCHLORIDE 0.25 MG: 0.25 INJECTION INTRAVENOUS at 10:39

## 2019-02-18 RX ADMIN — OXALIPLATIN 125 MG: 5 INJECTION, SOLUTION, CONCENTRATE INTRAVENOUS at 11:05

## 2019-02-18 RX ADMIN — DEXAMETHASONE SODIUM PHOSPHATE 20 MG: 10 INJECTION, SOLUTION INTRAMUSCULAR; INTRAVENOUS at 10:43

## 2019-02-18 RX ADMIN — FAMOTIDINE 20 MG: 10 INJECTION, SOLUTION INTRAVENOUS at 10:40

## 2019-02-18 RX ADMIN — DEXTROSE MONOHYDRATE 50 ML/HR: 50 INJECTION, SOLUTION INTRAVENOUS at 10:34

## 2019-02-18 RX ADMIN — FLUOROURACIL 3550 MG: 50 INJECTION, SOLUTION INTRAVENOUS at 16:43

## 2019-02-18 ASSESSMENT — PAIN SCALES - GENERAL: PAINLEVEL: 0-NO PAIN

## 2019-02-18 NOTE — PROGRESS NOTES
Medical Oncology Follow-Up    Date of Service: 2/18/2019    Subjective   HISTORY OF PRESENT ILLNESS:  This is a 73-year-old female with colon cancer.  The patient returns oncology clinic for follow-up and adjuvant chemotherapy.     The patient was diagnosed with colon cancer in October 2018 when she presented with 2-year history of anemia and more recent history of right lower quadrant abdominal pain.  She initially underwent a colonoscopy which revealed the presence of colon cancer in ascending colon. She underwent resection on October 25, 2018.  Surgical margins were negative, the tumor extended through pericolonic tissues.  There were 19 lymph nodes removed in 1 was metastatic.  Pathologically was a [id number] colon cancer.  After she recovered from surgery, I saw the patient for consultation in November 2018. Because of her high risk for recurrence he recommended adjuvant chemotherapy with FOLFOX over a period of 6 months.  After the first cycle she had neutropenia which required Neupogen and discontinuation of 5-FU injection.  With the second cycle she had a reaction to oxaliplatin requiring steroid premedication the night before and morning of chemotherapy.    Subsequently she did well.  Patient so far has received 5 cycles of chemotherapy.    The patient states that she has done well after last cycle overall.  She continues to have some minor side effects including intermittent numbness involving fingertips and her tongue and cold sensitivity.  She also has noticed that her current eyeglasses are not sufficient anymore and cannot focus when she reads but she denies any double vision, visual field defects or other types of visual abnormalities.          PAST MEDICAL HISTORY:   Past Medical History:   Diagnosis Date   • Blood disorder     anemic   • Complication of anesthesia    • Diabetes mellitus (CMS/HCC) (HCC)    • Hypertension    • Malignant neoplasm of ascending colon (CMS/HCC) (HCC) 10/19/2018   • PONV  (postoperative nausea and vomiting)    • Shortness of breath     low iron        FAMILY HISTORY:   Family History   Problem Relation Age of Onset   • Hypertension Mother    • COPD Mother    • Hypertension Father    • Brain Aneurysm Sister    • Cancer Maternal Grandmother 36        SOCIAL HISTORY:   Social History     Socioeconomic History   • Marital status:      Spouse name: Not on file   • Number of children: Not on file   • Years of education: Not on file   • Highest education level: Not on file   Social Needs   • Financial resource strain: Not on file   • Food insecurity - worry: Not on file   • Food insecurity - inability: Not on file   • Transportation needs - medical: Not on file   • Transportation needs - non-medical: Not on file   Occupational History   • Not on file   Tobacco Use   • Smoking status: Never Smoker   • Smokeless tobacco: Never Used   Substance and Sexual Activity   • Alcohol use: Yes     Alcohol/week: 4.2 oz     Types: 7 Glasses of wine per week     Comment: WEEKLY   • Drug use: No   • Sexual activity: Not on file   Other Topics Concern   • Not on file   Social History Narrative   • Not on file        ALLERGIES:   Allergies as of 02/18/2019   • (No Known Allergies)        MEDICATIONS:   Current Outpatient Medications   Medication Sig Dispense Refill   • acetaminophen (TYLENOL) 500 mg tablet Take 2 tablets (1,000 mg total) by mouth every 8 (eight) hours as needed for pain scale 1-3/10.  0   • atorvastatin (LIPITOR) 20 mg tablet TAKE 1 TABLET BY MOUTH EVERY DAY 90 tablet 0   • blood-glucose meter (ACCU-CHEK JOSE MANUEL) misc FPD 1 0   • cholecalciferol, vitamin D3, (cholecalciferol) 1,000 unit tablet Take 2,000 Units by mouth daily.     • dexamethasone (DECADRON) 4 mg tablet Take 20 mg PO the night before and morning of Oxaliplatin to prevent reaction. Take with food. 20 tablet 3   • ferrous sulfate (SLOW FE) 142 mg (45 mg iron) tablet extended release Take 1 tablet by mouth daily.     •  heparin, porcine, PF, 100 unit/mL syringe Infuse 5 mL into a venous catheter as needed (to flush port) Syringe #2 10 mL 11   • lancets (ACCU-CHEK FASTCLIX) [facility] USE BID TO TEST BLOOD SUGAR LEVELS 204 1   • lancing device with lancets (ACCU-CHEK FASTCLIX) kit Use as directed to check blood sugars twice daily. 100 each 3   • lidocaine-prilocaine (EMLA) cream Apply 1 application topically as needed for pain scale 1-3/10 Apply over port site 1 hour before scheduled access 30 g 2   • lidocaine-prilocaine (EMLA) cream Apply thick layer to intact skin over port one hour prior to procedure. Cover with occlusive dressing. 30 g 11   • losartan (COZAAR) 50 mg tablet TAKE 1 TABLET BY MOUTH EVERY DAY 90 tablet 0   • metFORMIN (GLUCOPHAGE) 500 mg tablet TAKE 1 TABLET BY MOUTH TWICE DAILY WITH MEALS 180 tablet 0   • sennosides-docusate sodium (SENNA WITH DOCUSATE SODIUM) 8.6-50 mg Take 1 tablet by mouth 2 (two) times a day as needed for constipation. Please take 1 tab BID while on narcotic pain medication to prevent constipation 30 tablet 0   • sodium chloride injection Infuse 10 mL into a venous catheter as needed (to flush port) Syringe #1 20 mL 11     No current facility-administered medications for this visit.        REVIEW OF SYSTEMS:   14 point systems review was done.  It was negative with the exception of mild fatigue, intermittent numbness involving fingers and tongue.    The ECOG performance status today is 0          Objective    PHYSICAL EXAM:   /69   Pulse 100   Temp 36.7 °C (98 °F) (Temporal)   Wt 50.3 kg (111 lb)   SpO2 96%   BMI 20.17 kg/m²    Body mass index is 20.17 kg/m².     Patient  looked well.  No distress.      HEENT: No scleral icterus.  No oral or pharyngeal lesions     Ln: No lymphadenopathy neck axilla or groin     Lungs: Clear.  No rales, rhonchi or wheezing     CVS: Regular rate and rhythm.  No S3.  No friction rub     Abdomen: Benign nontender.  No hepatosplenomegaly.  No palpable  abnormalities     Ext: No edema.  Pulses intact     Musculoskeletal: No muscle or bone tenderness including spine pelvis     Skin: No petechia purpura rash     Neuro: Alert oriented ×3.  No focal deficits.          Physical Exam    DIAGNOSTIC REPORTS REVIEWED:   CBC today shows a white cell count of 5.2, hemoglobin of 13.9 and platelet count of 133,000.  Absolute neutrophil count was 4.4.  The comprehensive metabolic panel was normal with the exception of elevated glucose at 307.           Assessment/Plan    IMPRESSION and PLAN:   This is a 73-year-old female with colon cancer.  The patient returns oncology clinic for follow-up and adjuvant chemotherapy.    Even though the patient has some side effects related to chemotherapy, these are relatively minor side effects related to oxaliplatin in the form of cold sensitivity and mild temporary neuropathy.  Otherwise she seems to be doing well without signs and symptoms of recurrence of her cancer.  She does not have other significant side effects related to chemotherapy.  Her examination is unremarkable.  Her labs are within normal/acceptable limits.  As a result we will proceed with sixth cycle of adjuvant FOLFOX.  Our plan is still 12 cycles of it.    I had a fairly long conversation with the patient.  I reviewed her labs.  We talked about her symptoms and I told her that they were acceptable for now I advised her to monitor her symptoms and report them to us.  Once again I reviewed the treatment plan and answered her questions.  The patient verbalized her understanding and approval of ongoing therapy my recommendations.  As a result, she will return to oncology clinic in 2 weeks for the next cycle of her adjuvant chemotherapy.               Physician: M. BEHNAN SAHIN, MD

## 2019-02-18 NOTE — PSYCHOTHERAPY
PST Follow Up/Check In     met with patient and , Daniel. Patient reports that she has been doing well with treatment. She does have some symptoms, but patient reports the symptoms have been manageable. Patient was happy to share today was her half way point in her treatment. Patient has not had any concerns or questions that have come up since last seeing .

## 2019-02-18 NOTE — PROGRESS NOTES
Patient made it through 4 hour oxaliplatin. About 30 minutes into wait time, patient began to experience heavy eyelids and had difficulty opening eyes. no other complaints. Spoke to Dr. Fierro who suggested warm packing the patients eyes. Gave patient warm washcloths. Within 5 minutes the patients eyes were back to baseline. Patient waited over an hour after her oxaliplatin to start her 5FU pump. Stayed for 10 minutes after pump was connected to ensure no further side effects. Patient stated she felt good. Sent a hotpack home with patient in case of recurring symptoms and advised to seek medical attention if needed.

## 2019-02-20 ENCOUNTER — HOSPITAL ENCOUNTER (OUTPATIENT)
Dept: INFUSION THERAPY | Facility: HOSPITAL | Age: 74
Setting detail: INFUSION SERIES
Discharge: STILL ADMITTED OR ADMINISTRATIVE | End: 2019-02-20
Payer: MEDICARE

## 2019-02-20 DIAGNOSIS — K63.89 MASS OF CECUM: ICD-10-CM

## 2019-02-20 DIAGNOSIS — C18.2 MALIGNANT NEOPLASM OF ASCENDING COLON (CMS/HCC): Primary | ICD-10-CM

## 2019-02-20 PROCEDURE — G0463 HOSPITAL OUTPT CLINIC VISIT: HCPCS

## 2019-02-20 PROCEDURE — 6360000200 HC RX 636 W HCPCS (ALT 250 FOR IP): Performed by: NURSE PRACTITIONER

## 2019-02-20 RX ORDER — HEPARIN 100 UNIT/ML
500 SYRINGE INTRAVENOUS AS NEEDED
Status: DISCONTINUED | OUTPATIENT
Start: 2019-02-20 | End: 2019-02-21 | Stop reason: HOSPADM

## 2019-02-20 RX ORDER — SODIUM CHLORIDE 0.9 % (FLUSH) 0.9 %
10 SYRINGE (ML) INJECTION AS NEEDED
Status: DISCONTINUED | OUTPATIENT
Start: 2019-02-20 | End: 2019-02-21 | Stop reason: HOSPADM

## 2019-02-20 RX ORDER — HEPARIN 100 UNIT/ML
500 SYRINGE INTRAVENOUS AS NEEDED
Status: CANCELLED | OUTPATIENT
Start: 2019-03-04

## 2019-02-20 RX ORDER — SODIUM CHLORIDE 0.9 % (FLUSH) 0.9 %
10 SYRINGE (ML) INJECTION AS NEEDED
Status: CANCELLED | OUTPATIENT
Start: 2019-03-04

## 2019-02-20 RX ADMIN — Medication 10 ML: at 14:42

## 2019-02-20 RX ADMIN — HEPARIN 5 ML: 100 SYRINGE at 14:42

## 2019-02-27 DIAGNOSIS — I10 ESSENTIAL HYPERTENSION: ICD-10-CM

## 2019-02-27 RX ORDER — LOSARTAN POTASSIUM 50 MG/1
TABLET ORAL
Qty: 90 TABLET | Refills: 0 | Status: SHIPPED | OUTPATIENT
Start: 2019-02-27 | End: 2019-05-31 | Stop reason: SDUPTHER

## 2019-03-04 ENCOUNTER — OFFICE VISIT (OUTPATIENT)
Dept: ONCOLOGY | Facility: CLINIC | Age: 74
End: 2019-03-04
Payer: MEDICARE

## 2019-03-04 ENCOUNTER — INFUSION (OUTPATIENT)
Dept: ONCOLOGY | Facility: CLINIC | Age: 74
End: 2019-03-04
Payer: MEDICARE

## 2019-03-04 ENCOUNTER — APPOINTMENT (OUTPATIENT)
Dept: ONCOLOGY | Facility: CLINIC | Age: 74
End: 2019-03-04
Payer: MEDICARE

## 2019-03-04 VITALS
HEART RATE: 101 BPM | DIASTOLIC BLOOD PRESSURE: 74 MMHG | TEMPERATURE: 98 F | OXYGEN SATURATION: 96 % | SYSTOLIC BLOOD PRESSURE: 163 MMHG | BODY MASS INDEX: 20.28 KG/M2 | WEIGHT: 111.6 LBS

## 2019-03-04 DIAGNOSIS — C18.2 MALIGNANT NEOPLASM OF ASCENDING COLON (CMS/HCC): ICD-10-CM

## 2019-03-04 DIAGNOSIS — C18.2 MALIGNANT NEOPLASM OF ASCENDING COLON (CMS/HCC): Primary | ICD-10-CM

## 2019-03-04 LAB
ALBUMIN SERPL-MCNC: 4 G/DL (ref 3.5–5.3)
ALP SERPL-CCNC: 84 U/L (ref 55–142)
ALT SERPL-CCNC: 25 U/L (ref 0–52)
ANION GAP SERPL CALC-SCNC: 11 MMOL/L (ref 3–11)
ANISOCYTOSIS PRESENCE IN BLOOD, ANALYZER: ABNORMAL
AST SERPL-CCNC: 38 U/L (ref 0–39)
BASOPHILS # BLD AUTO: 0 10*3/UL
BASOPHILS NFR BLD AUTO: 0 % (ref 0–2)
BILIRUB SERPL-MCNC: 0.56 MG/DL (ref 0–1.4)
BUN SERPL-MCNC: 12 MG/DL (ref 7–25)
CALCIUM ALBUM COR SERPL-MCNC: 9.6 MG/DL (ref 8.6–10.3)
CALCIUM SERPL-MCNC: 9.6 MG/DL (ref 8.6–10.3)
CHLORIDE SERPL-SCNC: 106 MMOL/L (ref 98–107)
CO2 SERPL-SCNC: 20 MMOL/L (ref 21–32)
CREAT SERPL-MCNC: 0.62 MG/DL (ref 0.6–1.2)
EOSINOPHIL # BLD AUTO: 0 10*3/UL
EOSINOPHIL NFR BLD AUTO: 0 % (ref 0–3)
ERYTHROCYTE [DISTWIDTH] IN BLOOD BY AUTOMATED COUNT: 23.1 % (ref 11.5–14)
GFR SERPL CREATININE-BSD FRML MDRD: 91 ML/MIN/1.73M*2
GLUCOSE SERPL-MCNC: 287 MG/DL (ref 70–105)
HCT VFR BLD AUTO: 41.6 % (ref 34–45)
HGB BLD-MCNC: 13.7 G/DL (ref 11.5–15.5)
LYMPHOCYTES # BLD AUTO: 0.4 10*3/UL
LYMPHOCYTES NFR BLD AUTO: 15 % (ref 11–47)
MCH RBC QN AUTO: 28.5 PG (ref 28–33)
MCHC RBC AUTO-ENTMCNC: 33 G/DL (ref 32–36)
MCV RBC AUTO: 86.5 FL (ref 81–97)
MONOCYTES # BLD AUTO: 0.1 10*3/UL
MONOCYTES NFR BLD AUTO: 2 % (ref 3–11)
NEUTROPHILS # BLD AUTO: 2.4 10*3/UL
NEUTROPHILS NFR BLD AUTO: 83 % (ref 41–81)
PLATELET # BLD AUTO: 91 10*3/UL (ref 140–350)
PMV BLD AUTO: 7 FL (ref 6.9–10.8)
POTASSIUM SERPL-SCNC: 4 MMOL/L (ref 3.5–5.1)
PROT SERPL-MCNC: 6.6 G/DL (ref 6–8.3)
RBC # BLD AUTO: 4.81 10*6/ΜL (ref 3.7–5.3)
SODIUM SERPL-SCNC: 137 MMOL/L (ref 135–145)
WBC # BLD AUTO: 2.9 10*3/UL (ref 4.5–10.5)

## 2019-03-04 PROCEDURE — 2580000300 HC RX 258: Performed by: NURSE PRACTITIONER

## 2019-03-04 PROCEDURE — 96416 CHEMO PROLONG INFUSE W/PUMP: CPT

## 2019-03-04 PROCEDURE — 85025 COMPLETE CBC W/AUTO DIFF WBC: CPT

## 2019-03-04 PROCEDURE — 80053 COMPREHEN METABOLIC PANEL: CPT

## 2019-03-04 PROCEDURE — 96415 CHEMO IV INFUSION ADDL HR: CPT

## 2019-03-04 PROCEDURE — G0463 HOSPITAL OUTPT CLINIC VISIT: HCPCS

## 2019-03-04 PROCEDURE — S0028 INJECTION, FAMOTIDINE, 20 MG: HCPCS | Performed by: NURSE PRACTITIONER

## 2019-03-04 PROCEDURE — 6360000200 HC RX 636 W HCPCS (ALT 250 FOR IP): Performed by: NURSE PRACTITIONER

## 2019-03-04 PROCEDURE — 36415 COLL VENOUS BLD VENIPUNCTURE: CPT

## 2019-03-04 PROCEDURE — 99213 OFFICE O/P EST LOW 20 MIN: CPT | Performed by: NURSE PRACTITIONER

## 2019-03-04 PROCEDURE — 96413 CHEMO IV INFUSION 1 HR: CPT

## 2019-03-04 PROCEDURE — 2500000200 HC RX 250 WO HCPCS: Performed by: NURSE PRACTITIONER

## 2019-03-04 PROCEDURE — 96375 TX/PRO/DX INJ NEW DRUG ADDON: CPT

## 2019-03-04 RX ORDER — DEXTROSE MONOHYDRATE 50 MG/ML
50 INJECTION, SOLUTION INTRAVENOUS AS NEEDED
Status: DISCONTINUED | OUTPATIENT
Start: 2019-03-04 | End: 2019-03-04 | Stop reason: HOSPADM

## 2019-03-04 RX ORDER — DIPHENHYDRAMINE HYDROCHLORIDE 50 MG/ML
50 INJECTION INTRAMUSCULAR; INTRAVENOUS ONCE
Status: CANCELLED
Start: 2019-03-04 | End: 2019-03-04

## 2019-03-04 RX ORDER — PALONOSETRON 0.05 MG/ML
0.25 INJECTION, SOLUTION INTRAVENOUS ONCE
Status: CANCELLED | OUTPATIENT
Start: 2019-03-04

## 2019-03-04 RX ORDER — PALONOSETRON 0.05 MG/ML
0.25 INJECTION, SOLUTION INTRAVENOUS ONCE
Status: COMPLETED | OUTPATIENT
Start: 2019-03-04 | End: 2019-03-04

## 2019-03-04 RX ORDER — DIPHENHYDRAMINE HYDROCHLORIDE 50 MG/ML
50 INJECTION INTRAMUSCULAR; INTRAVENOUS ONCE
Status: COMPLETED | OUTPATIENT
Start: 2019-03-04 | End: 2019-03-04

## 2019-03-04 RX ORDER — FAMOTIDINE 10 MG/ML
20 INJECTION INTRAVENOUS ONCE
Status: COMPLETED | OUTPATIENT
Start: 2019-03-04 | End: 2019-03-04

## 2019-03-04 RX ORDER — FAMOTIDINE 10 MG/ML
20 INJECTION INTRAVENOUS ONCE
Status: CANCELLED
Start: 2019-03-04 | End: 2019-03-04

## 2019-03-04 RX ORDER — DEXTROSE MONOHYDRATE 50 MG/ML
50 INJECTION, SOLUTION INTRAVENOUS AS NEEDED
Status: CANCELLED | OUTPATIENT
Start: 2019-03-04

## 2019-03-04 RX ORDER — DIPHENHYDRAMINE HYDROCHLORIDE 50 MG/ML
50 INJECTION INTRAMUSCULAR; INTRAVENOUS ONCE AS NEEDED
Status: CANCELLED | OUTPATIENT
Start: 2019-03-04

## 2019-03-04 RX ORDER — LORAZEPAM 2 MG/ML
1 INJECTION INTRAMUSCULAR ONCE AS NEEDED
Status: CANCELLED | OUTPATIENT
Start: 2019-03-04

## 2019-03-04 RX ADMIN — PALONOSETRON HYDROCHLORIDE 0.25 MG: 0.25 INJECTION INTRAVENOUS at 09:16

## 2019-03-04 RX ADMIN — FAMOTIDINE 20 MG: 10 INJECTION, SOLUTION INTRAVENOUS at 09:18

## 2019-03-04 RX ADMIN — FLUOROURACIL 3550 MG: 50 INJECTION, SOLUTION INTRAVENOUS at 14:32

## 2019-03-04 RX ADMIN — DIPHENHYDRAMINE HYDROCHLORIDE 50 MG: 50 INJECTION, SOLUTION INTRAMUSCULAR; INTRAVENOUS at 09:20

## 2019-03-04 RX ADMIN — DEXAMETHASONE SODIUM PHOSPHATE 20 MG: 10 INJECTION, SOLUTION INTRAMUSCULAR; INTRAVENOUS at 09:22

## 2019-03-04 RX ADMIN — OXALIPLATIN 125 MG: 5 INJECTION, SOLUTION, CONCENTRATE INTRAVENOUS at 09:55

## 2019-03-04 RX ADMIN — DEXTROSE MONOHYDRATE 50 ML/HR: 50 INJECTION, SOLUTION INTRAVENOUS at 09:11

## 2019-03-04 ASSESSMENT — ENCOUNTER SYMPTOMS
DIAPHORESIS: 0
FEVER: 0
WOUND: 0
FREQUENCY: 0
DIFFICULTY URINATING: 0
CHILLS: 0
SPEECH DIFFICULTY: 0
SEIZURES: 0
HEMOPTYSIS: 0
HEMATURIA: 0
HEADACHES: 1
APPETITE CHANGE: 0
ABDOMINAL DISTENTION: 0
FATIGUE: 0
ADENOPATHY: 0
SCLERAL ICTERUS: 0
SORE THROAT: 0
EXTREMITY WEAKNESS: 0
WHEEZING: 0
UNEXPECTED WEIGHT CHANGE: 0
DEPRESSION: 0
MYALGIAS: 0
NAUSEA: 0
TROUBLE SWALLOWING: 0
PALPITATIONS: 0
DIARRHEA: 1
ABDOMINAL PAIN: 0
CHEST TIGHTNESS: 0
NECK STIFFNESS: 0
DYSURIA: 0
NECK PAIN: 0
VOMITING: 0
EYE PROBLEMS: 1
ARTHRALGIAS: 0
SHORTNESS OF BREATH: 0
LIGHT-HEADEDNESS: 0
SLEEP DISTURBANCE: 0
CONSTIPATION: 0
COUGH: 0
VOICE CHANGE: 0
FLANK PAIN: 0
NERVOUS/ANXIOUS: 0
BACK PAIN: 0
LEG SWELLING: 0
BLOOD IN STOOL: 0
NUMBNESS: 1
DIZZINESS: 0
BRUISES/BLEEDS EASILY: 0

## 2019-03-04 NOTE — PROGRESS NOTES
Hematology/Medical Oncology Follow-Up    Patient Name: Shama Caballero  YOB: 1945  Medical Record Number: [id number]    DATE OF SERVICE:   3/4/2019    CHIEF COMPLAINT:  Shama Caballero is a 73 y.o. female who presents today for follow up and treatment of her colon cancer.    ONCOLOGY HISTORY:  The patient was diagnosed with colon cancer in the fall 2018.  She had a history of iron deficiency anemia for approximately 2 years.  In the fall 2018 she felt a lump in her right lower quadrant associated with indigestion.  She had lost approximately 5 pounds in the same timeframe.  Because of her symptoms she was seen and underwent a CT scan which showed the presence of a tumor involving the ascending colon.  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  She underwent resection on October 25, 2018.  Surgical margins were negative, the tumor extended through pericolonic tissues.  There were 19 lymph nodes removed in 1 was metastatic.  Pathologically was a [id number] colon cancer.  After she recovered from surgery she was seen in consultation with Dr. Fierro on November 16.  Because of her high risk for recurrence he recommended adjuvant chemotherapy with FOLFOX over a period of 6 months.  After the first cycle she had neutropenia which required Neupogen and discontinuation of 5-FU injection.  With the second cycle she had a reaction to oxaliplatin requiring steroid premedication the night before and morning of chemotherapy.    HISTORY OF PRESENT ILLNESS:  She is here to consider another cycle of FOLFOX. She has done well since her last cycle but has noticed symptoms of neuropathy are lasting in her fingertips.  They are worse with cold weather.  She continues to have headaches after taking steroids but has no other neurologic complaints today.      Past Medical History:   Diagnosis Date   • Blood disorder     anemic   •  Complication of anesthesia    • Diabetes mellitus (CMS/HCC) (HCC)    • Hypertension    • Malignant neoplasm of ascending colon (CMS/HCC) (HCC) 10/19/2018   • PONV (postoperative nausea and vomiting)    • Shortness of breath     low iron     Past Surgical History:   Procedure Laterality Date   • CHOLECYSTECTOMY     • COLON SURGERY Right 10/25/2018     LAPAROSCOPIC RIGHT HEMICOLECTOMY- Dr Rodriguez   • COLONOSCOPY N/A 10/19/2018    Dr Rodriguez   • EYE SURGERY Bilateral     CATARACT   • HYSTERECTOMY     • PORTACATH PLACEMENT N/A 11/30/2018    Procedure: PORTACATH INSERTION;  Surgeon: Tremaine Rodriguez MD;  Location[facility];  Service: General;  Laterality: N/A;   • TONSILLECTOMY       Social History     Socioeconomic History   • Marital status:      Spouse name: Not on file   • Number of children: Not on file   • Years of education: Not on file   • Highest education level: Not on file   Social Needs   • Financial resource strain: Not on file   • Food insecurity - worry: Not on file   • Food insecurity - inability: Not on file   • Transportation needs - medical: Not on file   • Transportation needs - non-medical: Not on file   Occupational History   • Not on file   Tobacco Use   • Smoking status: Never Smoker   • Smokeless tobacco: Never Used   Substance and Sexual Activity   • Alcohol use: Yes     Alcohol/week: 4.2 oz     Types: 7 Glasses of wine per week     Comment: WEEKLY   • Drug use: No   • Sexual activity: Not on file   Other Topics Concern   • Not on file   Social History Narrative   • Not on file      ALLERGIES:     Allergies as of 03/04/2019   • (No Known Allergies)      MEDICATIONS:     Current Outpatient Medications   Medication Sig Dispense Refill   • losartan (COZAAR) 50 mg tablet TAKE 1 TABLET BY MOUTH EVERY DAY 90 tablet 0   • atorvastatin (LIPITOR) 20 mg tablet TAKE 1 TABLET BY MOUTH EVERY DAY 90 tablet 0   • metFORMIN (GLUCOPHAGE) 500 mg tablet TAKE 1 TABLET BY MOUTH TWICE DAILY WITH MEALS  180 tablet 0   • dexamethasone (DECADRON) 4 mg tablet Take 20 mg PO the night before and morning of Oxaliplatin to prevent reaction. Take with food. 20 tablet 3   • lidocaine-prilocaine (EMLA) cream Apply 1 application topically as needed for pain scale 1-3/10 Apply over port site 1 hour before scheduled access 30 g 2   • lidocaine-prilocaine (EMLA) cream Apply thick layer to intact skin over port one hour prior to procedure. Cover with occlusive dressing. 30 g 11   • sodium chloride injection Infuse 10 mL into a venous catheter as needed (to flush port) Syringe #1 20 mL 11   • heparin, porcine, PF, 100 unit/mL syringe Infuse 5 mL into a venous catheter as needed (to flush port) Syringe #2 10 mL 11   • acetaminophen (TYLENOL) 500 mg tablet Take 2 tablets (1,000 mg total) by mouth every 8 (eight) hours as needed for pain scale 1-3/10.  0   • sennosides-docusate sodium (SENNA WITH DOCUSATE SODIUM) 8.6-50 mg Take 1 tablet by mouth 2 (two) times a day as needed for constipation. Please take 1 tab BID while on narcotic pain medication to prevent constipation 30 tablet 0   • cholecalciferol, vitamin D3, (cholecalciferol) 1,000 unit tablet Take 2,000 Units by mouth daily.     • ferrous sulfate (SLOW FE) 142 mg (45 mg iron) tablet extended release Take 1 tablet by mouth daily.     • lancing device with lancets (ACCU-CHEK FASTCLIX) kit Use as directed to check blood sugars twice daily. 100 each 3   • lancets (ACCU-CHEK FASTCLIX) [facility] USE BID TO TEST BLOOD SUGAR LEVELS 204 1   • blood-glucose meter (ACCU-CHEK JOSE MANUEL) misc FPD 1 0     No current facility-administered medications for this visit.      Facility-Administered Medications Ordered in Other Visits   Medication Dose Route Frequency Provider Last Rate Last Dose   • D5W continuous infusion  50 mL/hr intravenous PRN Suri Bee CNP 50 mL/hr at 03/04/19 0911 50 mL/hr at 03/04/19 0911   • oxaliplatin (ELOXATIN) 125 mg in D5W 275 mL chemo IVPB  85 mg/m2 (Treatment Plan  Recorded) intravenous Once Suri Bee CNP       • fluorouracil (ADRUCIL) 3,550 mg in normal saline 92 mL chemo pump  2,400 mg/m2 (Treatment Plan Recorded) intravenous Over 46 hr Suri Bee CNP         REVIEW OF SYSTEMS:   The ECOG performance status today is .0 - Asymptomatic  Review of Systems   Constitutional: Negative for appetite change, chills, diaphoresis, fatigue, fever and unexpected weight change.   HENT:   Positive for mouth sores. Negative for hearing loss, lump/mass, nosebleeds, sore throat, tinnitus, trouble swallowing and voice change.         Taste changes continue, sore/numb tongue r/b benadryl  Dry lips   Eyes: Positive for eye problems (blurred vision after oxaliplatin, improving). Negative for icterus.   Respiratory: Negative for chest tightness, cough, hemoptysis, shortness of breath and wheezing.    Cardiovascular: Negative for chest pain, leg swelling and palpitations.   Gastrointestinal: Positive for diarrhea (r/n imodium). Negative for abdominal distention, abdominal pain, blood in stool, constipation, nausea and vomiting.   Endocrine:        91-99 blood sugars at home   Genitourinary: Negative for difficulty urinating, dysuria, frequency and hematuria.    Musculoskeletal: Negative for arthralgias, back pain, flank pain, gait problem, myalgias, neck pain and neck stiffness.   Skin: Negative for itching, rash and wound.   Neurological: Positive for headaches (after taking steroid, relieved by tylenol) and numbness (fingertips only). Negative for dizziness, extremity weakness, gait problem, light-headedness, seizures and speech difficulty.        Cold sensitivities for 7-10 days   Hematological: Negative for adenopathy. Does not bruise/bleed easily.   Psychiatric/Behavioral: Negative for depression and sleep disturbance. The patient is not nervous/anxious.    All other systems reviewed and are negative.      PHYSICAL EXAM:   /74 (BP Location: Left arm)   Pulse 101   Temp  36.7 °C (98 °F) (Oral)   Wt 50.6 kg (111 lb 9.6 oz)   SpO2 96%   BMI 20.28 kg/m²  Pain 5/10 today, headache. Worse after steroids    Physical Exam   Constitutional: She is oriented to person, place, and time. She appears well-developed and well-nourished. No distress.   HENT:   Head: Normocephalic.   Nose: Nose normal.   Mouth/Throat: Oropharynx is clear and moist.   Eyes: Conjunctivae are normal. Right eye exhibits no discharge. Left eye exhibits no discharge. No scleral icterus.   Neck: Neck supple. No thyromegaly present.   Cardiovascular: Normal rate, regular rhythm, normal heart sounds and intact distal pulses. Exam reveals no gallop and no friction rub.   No murmur heard.  Pulmonary/Chest: Effort normal and breath sounds normal. No respiratory distress. She has no wheezes. She has no rhonchi. She has no rales. She exhibits no tenderness.   Abdominal: Soft. Bowel sounds are normal. She exhibits no distension and no mass. There is no hepatosplenomegaly. There is no tenderness. There is no rebound and no guarding.   Musculoskeletal: Normal range of motion. She exhibits no edema or tenderness.   Lymphadenopathy:        Head (right side): No submental, no submandibular, no tonsillar, no preauricular, no posterior auricular and no occipital adenopathy present.        Head (left side): No submental, no submandibular, no tonsillar, no preauricular, no posterior auricular and no occipital adenopathy present.     She has no cervical adenopathy.     She has no axillary adenopathy.        Right: No inguinal and no supraclavicular adenopathy present.        Left: No inguinal and no supraclavicular adenopathy present.   Neurological: She is alert and oriented to person, place, and time. No cranial nerve deficit.   Skin: Skin is warm and dry. Capillary refill takes less than 2 seconds. No rash noted. No erythema. No pallor.   Right chest wall port well healed   Psychiatric: She has a normal mood and affect. Her behavior  is normal. Judgment and thought content normal.       LABORATORY DATA:      Lab Results   Component Value Date    WBC 2.9 (L) 03/04/2019    HGB 13.7 03/04/2019    HCT 41.6 03/04/2019    PLT 91 (L) 03/04/2019    RBC 4.81 03/04/2019    MCV 86.5 03/04/2019    MCH 28.5 03/04/2019    MCHC 33.0 03/04/2019    RDW 23.1 (H) 03/04/2019    MPV 7.0 03/04/2019    ANC 2.009 12/24/2018    NEUTROABS 2.40 03/04/2019     Lab Results   Component Value Date     03/04/2019    K 4.0 03/04/2019     03/04/2019    CO2 20 (L) 03/04/2019    BUN 12 03/04/2019    CREATININE 0.62 03/04/2019    GLUCOSE 287 (H) 03/04/2019    CALCIUM 9.6 03/04/2019    PROT 6.6 03/04/2019    ALBUMIN 4.0 03/04/2019    AST 38 03/04/2019    ALT 25 03/04/2019    ALKPHOS 84 03/04/2019    BILITOT 0.56 03/04/2019       DIAGNOSTIC IMAGING:   None performed today.      IMPRESSION & PLAN:   1.  Colon cancer: She appears to be clinically stable without signs or symptoms of disease based on physical examination laboratory data from today's visit. She tolerated the last cycle well.  Her labs are stable and in acceptable limits for treatment today, she does have mild thrombocytopenia.  She will continue with premedications prior to oxaliplatin. Will proceed with cycle 7 of oxaliplatin and 5-FU infusion without dose reduction.  2.  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 Will monitor. Hot packs for eyes during/after oxaliplatin infusion.  3.  Hyperglycemia: This is stable, blood sugars at home have been in the 90's.    4.  Follow-up in 2 weeks prior to the next cycle with repeat labs.  Instructed to call with questions or concerns.      Electronically signed by: DANIKA ORTIZ CNP

## 2019-03-06 ENCOUNTER — HOSPITAL ENCOUNTER (OUTPATIENT)
Dept: INFUSION THERAPY | Facility: HOSPITAL | Age: 74
Setting detail: INFUSION SERIES
Discharge: STILL ADMITTED OR ADMINISTRATIVE | End: 2019-03-06
Payer: MEDICARE

## 2019-03-06 VITALS
SYSTOLIC BLOOD PRESSURE: 138 MMHG | HEART RATE: 81 BPM | TEMPERATURE: 98.4 F | DIASTOLIC BLOOD PRESSURE: 66 MMHG | RESPIRATION RATE: 16 BRPM

## 2019-03-06 DIAGNOSIS — K63.89 MASS OF CECUM: ICD-10-CM

## 2019-03-06 DIAGNOSIS — C18.2 MALIGNANT NEOPLASM OF ASCENDING COLON (CMS/HCC): Primary | ICD-10-CM

## 2019-03-06 PROCEDURE — G0463 HOSPITAL OUTPT CLINIC VISIT: HCPCS

## 2019-03-06 PROCEDURE — 96523 IRRIG DRUG DELIVERY DEVICE: CPT

## 2019-03-06 PROCEDURE — 6360000200 HC RX 636 W HCPCS (ALT 250 FOR IP): Performed by: NURSE PRACTITIONER

## 2019-03-06 RX ORDER — HEPARIN 100 UNIT/ML
500 SYRINGE INTRAVENOUS AS NEEDED
Status: DISCONTINUED | OUTPATIENT
Start: 2019-03-06 | End: 2019-03-07 | Stop reason: HOSPADM

## 2019-03-06 RX ORDER — HEPARIN 100 UNIT/ML
500 SYRINGE INTRAVENOUS AS NEEDED
Status: CANCELLED | OUTPATIENT
Start: 2019-03-18

## 2019-03-06 RX ORDER — SODIUM CHLORIDE 0.9 % (FLUSH) 0.9 %
10 SYRINGE (ML) INJECTION AS NEEDED
Status: CANCELLED | OUTPATIENT
Start: 2019-03-18

## 2019-03-06 RX ORDER — SODIUM CHLORIDE 0.9 % (FLUSH) 0.9 %
10 SYRINGE (ML) INJECTION AS NEEDED
Status: DISCONTINUED | OUTPATIENT
Start: 2019-03-06 | End: 2019-03-07 | Stop reason: HOSPADM

## 2019-03-06 RX ADMIN — SODIUM CHLORIDE, PRESERVATIVE FREE 5 ML: 5 INJECTION INTRAVENOUS at 13:31

## 2019-03-06 RX ADMIN — Medication 20 ML: at 13:31

## 2019-03-06 ASSESSMENT — PAIN SCALES - GENERAL: PAINLEVEL: 0-NO PAIN

## 2019-03-09 PROCEDURE — 96523 IRRIG DRUG DELIVERY DEVICE: CPT

## 2019-03-18 ENCOUNTER — APPOINTMENT (OUTPATIENT)
Dept: ONCOLOGY | Facility: CLINIC | Age: 74
End: 2019-03-18
Payer: MEDICARE

## 2019-03-18 ENCOUNTER — INFUSION (OUTPATIENT)
Dept: ONCOLOGY | Facility: CLINIC | Age: 74
End: 2019-03-18
Payer: MEDICARE

## 2019-03-18 ENCOUNTER — OFFICE VISIT (OUTPATIENT)
Dept: ONCOLOGY | Facility: CLINIC | Age: 74
End: 2019-03-18
Payer: MEDICARE

## 2019-03-18 VITALS
HEIGHT: 62 IN | TEMPERATURE: 97.4 F | HEART RATE: 98 BPM | WEIGHT: 111.6 LBS | DIASTOLIC BLOOD PRESSURE: 79 MMHG | OXYGEN SATURATION: 96 % | BODY MASS INDEX: 20.54 KG/M2 | SYSTOLIC BLOOD PRESSURE: 160 MMHG

## 2019-03-18 DIAGNOSIS — C18.2 MALIGNANT NEOPLASM OF ASCENDING COLON (CMS/HCC): ICD-10-CM

## 2019-03-18 DIAGNOSIS — C18.2 MALIGNANT NEOPLASM OF ASCENDING COLON (CMS/HCC): Primary | ICD-10-CM

## 2019-03-18 LAB
ALBUMIN SERPL-MCNC: 4.1 G/DL (ref 3.5–5.3)
ALP SERPL-CCNC: 96 U/L (ref 55–142)
ALT SERPL-CCNC: 27 U/L (ref 0–52)
ANION GAP SERPL CALC-SCNC: 14 MMOL/L (ref 3–11)
ANISOCYTOSIS PRESENCE IN BLOOD, ANALYZER: ABNORMAL
AST SERPL-CCNC: 38 U/L (ref 0–39)
BASOPHILS # BLD AUTO: 0 10*3/UL
BASOPHILS NFR BLD AUTO: 0 % (ref 0–2)
BILIRUB SERPL-MCNC: 0.79 MG/DL (ref 0–1.4)
BUN SERPL-MCNC: 16 MG/DL (ref 7–25)
CALCIUM ALBUM COR SERPL-MCNC: 9.4 MG/DL (ref 8.6–10.3)
CALCIUM SERPL-MCNC: 9.5 MG/DL (ref 8.6–10.3)
CHLORIDE SERPL-SCNC: 105 MMOL/L (ref 98–107)
CO2 SERPL-SCNC: 22 MMOL/L (ref 21–32)
CREAT SERPL-MCNC: 0.69 MG/DL (ref 0.6–1.2)
EOSINOPHIL # BLD AUTO: 0 10*3/UL
EOSINOPHIL NFR BLD AUTO: 0 % (ref 0–3)
ERYTHROCYTE [DISTWIDTH] IN BLOOD BY AUTOMATED COUNT: 23.5 % (ref 11.5–14)
GFR SERPL CREATININE-BSD FRML MDRD: 88 ML/MIN/1.73M*2
GLUCOSE SERPL-MCNC: 212 MG/DL (ref 70–105)
HCT VFR BLD AUTO: 41.6 % (ref 34–45)
HGB BLD-MCNC: 13.7 G/DL (ref 11.5–15.5)
LYMPHOCYTES # BLD AUTO: 0.4 10*3/UL
LYMPHOCYTES NFR BLD AUTO: 10 % (ref 11–47)
MCH RBC QN AUTO: 29.4 PG (ref 28–33)
MCHC RBC AUTO-ENTMCNC: 33.1 G/DL (ref 32–36)
MCV RBC AUTO: 89 FL (ref 81–97)
MONOCYTES # BLD AUTO: 0.1 10*3/UL
MONOCYTES NFR BLD AUTO: 2 % (ref 3–11)
NEUTROPHILS # BLD AUTO: 3.5 10*3/UL
NEUTROPHILS NFR BLD AUTO: 88 % (ref 41–81)
PLATELET # BLD AUTO: 103 10*3/UL (ref 140–350)
PMV BLD AUTO: 6.8 FL (ref 6.9–10.8)
POTASSIUM SERPL-SCNC: 4 MMOL/L (ref 3.5–5.1)
PROT SERPL-MCNC: 6.6 G/DL (ref 6–8.3)
RBC # BLD AUTO: 4.67 10*6/ΜL (ref 3.7–5.3)
SODIUM SERPL-SCNC: 141 MMOL/L (ref 135–145)
WBC # BLD AUTO: 4 10*3/UL (ref 4.5–10.5)

## 2019-03-18 PROCEDURE — 99214 OFFICE O/P EST MOD 30 MIN: CPT | Performed by: INTERNAL MEDICINE

## 2019-03-18 PROCEDURE — 80053 COMPREHEN METABOLIC PANEL: CPT

## 2019-03-18 PROCEDURE — S0028 INJECTION, FAMOTIDINE, 20 MG: HCPCS | Performed by: INTERNAL MEDICINE

## 2019-03-18 PROCEDURE — 96413 CHEMO IV INFUSION 1 HR: CPT

## 2019-03-18 PROCEDURE — 85025 COMPLETE CBC W/AUTO DIFF WBC: CPT

## 2019-03-18 PROCEDURE — 96375 TX/PRO/DX INJ NEW DRUG ADDON: CPT

## 2019-03-18 PROCEDURE — 96415 CHEMO IV INFUSION ADDL HR: CPT

## 2019-03-18 PROCEDURE — 2500000200 HC RX 250 WO HCPCS: Performed by: INTERNAL MEDICINE

## 2019-03-18 PROCEDURE — 36415 COLL VENOUS BLD VENIPUNCTURE: CPT

## 2019-03-18 PROCEDURE — 96416 CHEMO PROLONG INFUSE W/PUMP: CPT

## 2019-03-18 PROCEDURE — G0463 HOSPITAL OUTPT CLINIC VISIT: HCPCS

## 2019-03-18 PROCEDURE — 2580000300 HC RX 258: Performed by: INTERNAL MEDICINE

## 2019-03-18 PROCEDURE — 6360000200 HC RX 636 W HCPCS (ALT 250 FOR IP): Performed by: INTERNAL MEDICINE

## 2019-03-18 RX ORDER — DIPHENHYDRAMINE HYDROCHLORIDE 50 MG/ML
50 INJECTION INTRAMUSCULAR; INTRAVENOUS ONCE
Status: CANCELLED
Start: 2019-03-18 | End: 2019-03-18

## 2019-03-18 RX ORDER — DEXTROSE MONOHYDRATE 50 MG/ML
50 INJECTION, SOLUTION INTRAVENOUS AS NEEDED
Status: CANCELLED | OUTPATIENT
Start: 2019-03-18

## 2019-03-18 RX ORDER — PALONOSETRON 0.05 MG/ML
0.25 INJECTION, SOLUTION INTRAVENOUS ONCE
Status: COMPLETED | OUTPATIENT
Start: 2019-03-18 | End: 2019-03-18

## 2019-03-18 RX ORDER — FAMOTIDINE 10 MG/ML
20 INJECTION INTRAVENOUS ONCE
Status: COMPLETED | OUTPATIENT
Start: 2019-03-18 | End: 2019-03-18

## 2019-03-18 RX ORDER — PALONOSETRON 0.05 MG/ML
0.25 INJECTION, SOLUTION INTRAVENOUS ONCE
Status: CANCELLED | OUTPATIENT
Start: 2019-03-18

## 2019-03-18 RX ORDER — DIPHENHYDRAMINE HYDROCHLORIDE 50 MG/ML
50 INJECTION INTRAMUSCULAR; INTRAVENOUS ONCE
Status: COMPLETED | OUTPATIENT
Start: 2019-03-18 | End: 2019-03-18

## 2019-03-18 RX ORDER — PROCHLORPERAZINE MALEATE 10 MG
TABLET ORAL
COMMUNITY
Start: 2019-02-27 | End: 2020-05-20 | Stop reason: ALTCHOICE

## 2019-03-18 RX ORDER — DEXTROSE MONOHYDRATE 50 MG/ML
50 INJECTION, SOLUTION INTRAVENOUS AS NEEDED
Status: DISCONTINUED | OUTPATIENT
Start: 2019-03-18 | End: 2019-03-18 | Stop reason: HOSPADM

## 2019-03-18 RX ORDER — DIPHENHYDRAMINE HYDROCHLORIDE 50 MG/ML
50 INJECTION INTRAMUSCULAR; INTRAVENOUS ONCE AS NEEDED
Status: CANCELLED | OUTPATIENT
Start: 2019-03-18

## 2019-03-18 RX ORDER — LORAZEPAM 2 MG/ML
1 INJECTION INTRAMUSCULAR ONCE AS NEEDED
Status: CANCELLED | OUTPATIENT
Start: 2019-03-18

## 2019-03-18 RX ORDER — FAMOTIDINE 10 MG/ML
20 INJECTION INTRAVENOUS ONCE
Status: CANCELLED
Start: 2019-03-18 | End: 2019-03-18

## 2019-03-18 RX ADMIN — DIPHENHYDRAMINE HYDROCHLORIDE 50 MG: 50 INJECTION, SOLUTION INTRAMUSCULAR; INTRAVENOUS at 10:31

## 2019-03-18 RX ADMIN — DEXAMETHASONE SODIUM PHOSPHATE 20 MG: 10 INJECTION, SOLUTION INTRAMUSCULAR; INTRAVENOUS at 10:39

## 2019-03-18 RX ADMIN — PALONOSETRON HYDROCHLORIDE 0.25 MG: 0.25 INJECTION, SOLUTION INTRAVENOUS at 10:38

## 2019-03-18 RX ADMIN — OXALIPLATIN 100 MG: 5 INJECTION, SOLUTION, CONCENTRATE INTRAVENOUS at 11:26

## 2019-03-18 RX ADMIN — FLUOROURACIL 2800 MG: 50 INJECTION, SOLUTION INTRAVENOUS at 16:46

## 2019-03-18 RX ADMIN — FAMOTIDINE 20 MG: 10 INJECTION, SOLUTION INTRAVENOUS at 10:36

## 2019-03-18 RX ADMIN — DEXTROSE MONOHYDRATE 50 ML/HR: 50 INJECTION, SOLUTION INTRAVENOUS at 10:32

## 2019-03-18 ASSESSMENT — PAIN SCALES - GENERAL: PAINLEVEL: 0-NO PAIN

## 2019-03-18 NOTE — PROGRESS NOTES
Medical Oncology Follow-Up    Date of Service: 3/18/2019    Subjective   HISTORY OF PRESENT ILLNESS:  This is a 73-year-old female with colon cancer.  The patient returns oncology clinic for follow-up and adjuvant chemotherapy.     The patient was diagnosed with colon cancer in October 2018 when she presented with 2-year history of anemia and more recent history of right lower quadrant abdominal pain.  She initially underwent a colonoscopy which revealed the presence of colon cancer in ascending colon. She underwent resection on October 25, 2018.  Surgical margins were negative, the tumor extended through pericolonic tissues.  There were 19 lymph nodes removed in 1 was metastatic.  Pathologically was a [id number] colon cancer.  After she recovered from surgery, I saw the patient for consultation in November 2018. Because of her high risk for recurrence he recommended adjuvant chemotherapy with FOLFOX over a period of 6 months.  After the first cycle she had neutropenia which required Neupogen and discontinuation of 5-FU injection.  With the second cycle she had a reaction to oxaliplatin requiring steroid premedication the night before and morning of chemotherapy.    Subsequently she did well.  Patient so far has received cycles of chemotherapy without any major complications.    The patient states that last cycle was the most difficult one for her.  In addition to significant fatigue, she experienced some numbness in her tongue without swelling and weakness in her eyelids for a few days after the chemotherapy.  She has intermittent numbness and tingling sensation in her extremities but she denies any constant numbness in fingers or toes.  She denies any other neurologic symptoms.  She also developed a sore mouth and diarrhea after the last cycle which have now resolved.          PAST MEDICAL HISTORY:   Past Medical History:   Diagnosis Date   • Blood disorder     anemic   • Complication of anesthesia    • Diabetes  mellitus (CMS/HCC) (HCC)    • Hypertension    • Malignant neoplasm of ascending colon (CMS/HCC) (HCC) 10/19/2018   • PONV (postoperative nausea and vomiting)    • Shortness of breath     low iron        FAMILY HISTORY:   Family History   Problem Relation Age of Onset   • Hypertension Mother    • COPD Mother    • Hypertension Father    • Brain Aneurysm Sister    • Cancer Maternal Grandmother 36        SOCIAL HISTORY:   Social History     Socioeconomic History   • Marital status:      Spouse name: Not on file   • Number of children: Not on file   • Years of education: Not on file   • Highest education level: Not on file   Social Needs   • Financial resource strain: Not on file   • Food insecurity - worry: Not on file   • Food insecurity - inability: Not on file   • Transportation needs - medical: Not on file   • Transportation needs - non-medical: Not on file   Occupational History   • Not on file   Tobacco Use   • Smoking status: Never Smoker   • Smokeless tobacco: Never Used   Substance and Sexual Activity   • Alcohol use: Yes     Alcohol/week: 4.2 oz     Types: 7 Glasses of wine per week     Comment: WEEKLY   • Drug use: No   • Sexual activity: Not on file   Other Topics Concern   • Not on file   Social History Narrative   • Not on file        ALLERGIES:   Allergies as of 03/18/2019   • (No Known Allergies)        MEDICATIONS:   Current Outpatient Medications   Medication Sig Dispense Refill   • prochlorperazine (COMPAZINE) 10 mg tablet      • losartan (COZAAR) 50 mg tablet TAKE 1 TABLET BY MOUTH EVERY DAY 90 tablet 0   • atorvastatin (LIPITOR) 20 mg tablet TAKE 1 TABLET BY MOUTH EVERY DAY 90 tablet 0   • metFORMIN (GLUCOPHAGE) 500 mg tablet TAKE 1 TABLET BY MOUTH TWICE DAILY WITH MEALS 180 tablet 0   • dexamethasone (DECADRON) 4 mg tablet Take 20 mg PO the night before and morning of Oxaliplatin to prevent reaction. Take with food. 20 tablet 3   • lidocaine-prilocaine (EMLA) cream Apply 1 application  "topically as needed for pain scale 1-3/10 Apply over port site 1 hour before scheduled access 30 g 2   • lidocaine-prilocaine (EMLA) cream Apply thick layer to intact skin over port one hour prior to procedure. Cover with occlusive dressing. 30 g 11   • sodium chloride injection Infuse 10 mL into a venous catheter as needed (to flush port) Syringe #1 20 mL 11   • heparin, porcine, PF, 100 unit/mL syringe Infuse 5 mL into a venous catheter as needed (to flush port) Syringe #2 10 mL 11   • acetaminophen (TYLENOL) 500 mg tablet Take 2 tablets (1,000 mg total) by mouth every 8 (eight) hours as needed for pain scale 1-3/10.  0   • sennosides-docusate sodium (SENNA WITH DOCUSATE SODIUM) 8.6-50 mg Take 1 tablet by mouth 2 (two) times a day as needed for constipation. Please take 1 tab BID while on narcotic pain medication to prevent constipation 30 tablet 0   • cholecalciferol, vitamin D3, (cholecalciferol) 1,000 unit tablet Take 2,000 Units by mouth daily.     • ferrous sulfate (SLOW FE) 142 mg (45 mg iron) tablet extended release Take 1 tablet by mouth daily.     • lancing device with lancets (ACCU-CHEK FASTCLIX) kit Use as directed to check blood sugars twice daily. 100 each 3   • lancets (ACCU-CHEK FASTCLIX) [facility] [place] USE BID TO TEST BLOOD SUGAR LEVELS 204 1   • blood-glucose meter (ACCU-CHEK JOSE MANUEL) misc FPD 1 0     No current facility-administered medications for this visit.        REVIEW OF SYSTEMS:   14 point systems review was done.  It was negative with the exception of significant fatigue, significant diarrhea which has resolved, intermittent numbness and tingling sensation in fingers and toes.    The ECOG performance status today is 0          Objective    PHYSICAL EXAM:   /79   Pulse 98   Temp 36.3 °C (97.4 °F) (Temporal)   Ht 1.575 m (5' 2\")   Wt 50.6 kg (111 lb 9.6 oz)   SpO2 96%   BMI 20.41 kg/m²    Body mass index is 20.41 kg/m².     Patient  looked a bit fatigued but otherwise well.  No distress. "      HEENT: No scleral icterus.  No oral or pharyngeal lesions     Ln: No lymphadenopathy neck axilla or groin     Lungs: Clear.  No rales, rhonchi or wheezing     CVS: Regular rate and rhythm.  No S3.  No friction rub     Abdomen: Benign nontender.  No hepatosplenomegaly.  No palpable abnormalities     Ext: No edema.  Pulses intact     Musculoskeletal: No muscle or bone tenderness including spine pelvis     Skin: No petechia purpura rash     Neuro: Alert oriented ×3.  No focal deficits.            Physical Exam    DIAGNOSTIC REPORTS REVIEWED:   CBC today shows a white cell count of 4.0, hemoglobin of 13.7 and platelet count of 103,000.  Comprehensive metabolic panel was normal with the exception of elevated glucose at 212.           Assessment/Plan    IMPRESSION and PLAN:   This is a 73-year-old female with colon cancer.  The patient returns oncology clinic for follow-up and adjuvant chemotherapy.    The patient has started experiencing significant accumulative toxicity from adjuvant FOLFOX that she has been on for stage III colon cancer.  She has signs and symptoms of toxicity from both oxaliplatin as well as 5-FU.  Her symptoms are significant enough to require dose reduction.  I will reduce the dose of both oxaliplatin and 5-FU by 20%.  We will also support her with Neulasta injection to prevent any significant neutropenia as she recently has had borderline white cell counts.  I am hoping that with these changes she will be able to tolerate the chemotherapy better and will finish the remaining cycles of her adjuvant therapy.    I had a fairly long conversation with the patient.  I reviewed her labs.  We spent quite a bit time talking about her symptoms and the changes we will make in the treatment plan to reduce the side effects.  I also answered her questions.  The patient verbalized her understanding and approval of ongoing therapy my recommendations.  As a result, she will return to oncology clinic in 2 weeks  for the next cycle of her adjuvant chemotherapy.               Physician: M. BEHNAN SAHIN, MD

## 2019-03-20 ENCOUNTER — HOSPITAL ENCOUNTER (OUTPATIENT)
Dept: INFUSION THERAPY | Facility: HOSPITAL | Age: 74
Setting detail: INFUSION SERIES
Discharge: STILL ADMITTED OR ADMINISTRATIVE | End: 2019-03-20
Payer: MEDICARE

## 2019-03-20 VITALS — HEART RATE: 89 BPM | SYSTOLIC BLOOD PRESSURE: 157 MMHG | DIASTOLIC BLOOD PRESSURE: 81 MMHG

## 2019-03-20 DIAGNOSIS — C18.2 MALIGNANT NEOPLASM OF ASCENDING COLON (CMS/HCC): Primary | ICD-10-CM

## 2019-03-20 DIAGNOSIS — K63.89 MASS OF CECUM: ICD-10-CM

## 2019-03-20 PROCEDURE — 6360000200 HC RX 636 W HCPCS (ALT 250 FOR IP): Mod: TB | Performed by: INTERNAL MEDICINE

## 2019-03-20 PROCEDURE — 96377 APPLICATON ON-BODY INJECTOR: CPT

## 2019-03-20 PROCEDURE — G0463 HOSPITAL OUTPT CLINIC VISIT: HCPCS

## 2019-03-20 PROCEDURE — 6360000200 HC RX 636 W HCPCS (ALT 250 FOR IP): Performed by: NURSE PRACTITIONER

## 2019-03-20 RX ORDER — SODIUM CHLORIDE 0.9 % (FLUSH) 0.9 %
10 SYRINGE (ML) INJECTION AS NEEDED
Status: DISCONTINUED | OUTPATIENT
Start: 2019-03-20 | End: 2019-03-21 | Stop reason: HOSPADM

## 2019-03-20 RX ORDER — SODIUM CHLORIDE 0.9 % (FLUSH) 0.9 %
10 SYRINGE (ML) INJECTION AS NEEDED
Status: CANCELLED | OUTPATIENT
Start: 2019-04-01

## 2019-03-20 RX ORDER — HEPARIN 100 UNIT/ML
500 SYRINGE INTRAVENOUS AS NEEDED
Status: CANCELLED | OUTPATIENT
Start: 2019-04-01

## 2019-03-20 RX ORDER — HEPARIN 100 UNIT/ML
500 SYRINGE INTRAVENOUS AS NEEDED
Status: DISCONTINUED | OUTPATIENT
Start: 2019-03-20 | End: 2019-03-21 | Stop reason: HOSPADM

## 2019-03-20 RX ADMIN — HEPARIN 5 ML: 100 SYRINGE at 14:59

## 2019-03-20 RX ADMIN — PEGFILGRASTIM 6 MG: KIT SUBCUTANEOUS at 15:05

## 2019-04-01 ENCOUNTER — APPOINTMENT (OUTPATIENT)
Dept: ONCOLOGY | Facility: CLINIC | Age: 74
End: 2019-04-01
Payer: MEDICARE

## 2019-04-01 ENCOUNTER — OFFICE VISIT (OUTPATIENT)
Dept: ONCOLOGY | Facility: CLINIC | Age: 74
End: 2019-04-01
Payer: MEDICARE

## 2019-04-01 ENCOUNTER — INFUSION (OUTPATIENT)
Dept: ONCOLOGY | Facility: CLINIC | Age: 74
End: 2019-04-01
Payer: MEDICARE

## 2019-04-01 VITALS
DIASTOLIC BLOOD PRESSURE: 81 MMHG | SYSTOLIC BLOOD PRESSURE: 153 MMHG | OXYGEN SATURATION: 97 % | TEMPERATURE: 97.8 F | BODY MASS INDEX: 20.05 KG/M2 | WEIGHT: 109.6 LBS | HEART RATE: 103 BPM

## 2019-04-01 DIAGNOSIS — C18.2 MALIGNANT NEOPLASM OF ASCENDING COLON (CMS/HCC): ICD-10-CM

## 2019-04-01 DIAGNOSIS — C18.2 MALIGNANT NEOPLASM OF ASCENDING COLON (CMS/HCC): Primary | ICD-10-CM

## 2019-04-01 LAB
ALBUMIN SERPL-MCNC: 4 G/DL (ref 3.5–5.3)
ALP SERPL-CCNC: 145 U/L (ref 55–142)
ALT SERPL-CCNC: 27 U/L (ref 0–52)
ANION GAP SERPL CALC-SCNC: 13 MMOL/L (ref 3–11)
ANISOCYTOSIS PRESENCE IN BLOOD, ANALYZER: ABNORMAL
AST SERPL-CCNC: 41 U/L (ref 0–39)
BASOPHILS # BLD AUTO: 0.1 10*3/UL
BASOPHILS NFR BLD AUTO: 2 % (ref 0–2)
BILIRUB SERPL-MCNC: 0.86 MG/DL (ref 0–1.4)
BUN SERPL-MCNC: 17 MG/DL (ref 7–25)
CALCIUM ALBUM COR SERPL-MCNC: 9.4 MG/DL (ref 8.6–10.3)
CALCIUM SERPL-MCNC: 9.4 MG/DL (ref 8.6–10.3)
CHLORIDE SERPL-SCNC: 102 MMOL/L (ref 98–107)
CO2 SERPL-SCNC: 19 MMOL/L (ref 21–32)
CREAT SERPL-MCNC: 0.8 MG/DL (ref 0.6–1.2)
EOSINOPHIL # BLD AUTO: 0 10*3/UL
EOSINOPHIL NFR BLD AUTO: 0 % (ref 0–3)
ERYTHROCYTE [DISTWIDTH] IN BLOOD BY AUTOMATED COUNT: 23.4 % (ref 11.5–14)
GFR SERPL CREATININE-BSD FRML MDRD: 74 ML/MIN/1.73M*2
GLUCOSE SERPL-MCNC: 262 MG/DL (ref 70–105)
HCT VFR BLD AUTO: 42.5 % (ref 34–45)
HGB BLD-MCNC: 13.9 G/DL (ref 11.5–15.5)
LYMPHOCYTES # BLD AUTO: 0.5 10*3/UL
LYMPHOCYTES NFR BLD AUTO: 8 % (ref 11–47)
MCH RBC QN AUTO: 30.6 PG (ref 28–33)
MCHC RBC AUTO-ENTMCNC: 32.7 G/DL (ref 32–36)
MCV RBC AUTO: 93.5 FL (ref 81–97)
MONOCYTES # BLD AUTO: 0 10*3/UL
MONOCYTES NFR BLD AUTO: 0 % (ref 3–11)
NEUTROPHILS # BLD AUTO: 6.1 10*3/UL
NEUTROPHILS NFR BLD AUTO: 90 % (ref 41–81)
PLATELET # BLD AUTO: 96 10*3/UL (ref 140–350)
PMV BLD AUTO: 7.2 FL (ref 6.9–10.8)
POTASSIUM SERPL-SCNC: 4 MMOL/L (ref 3.5–5.1)
PROT SERPL-MCNC: 6.8 G/DL (ref 6–8.3)
RBC # BLD AUTO: 4.55 10*6/ΜL (ref 3.7–5.3)
SODIUM SERPL-SCNC: 134 MMOL/L (ref 135–145)
WBC # BLD AUTO: 6.7 10*3/UL (ref 4.5–10.5)

## 2019-04-01 PROCEDURE — 80053 COMPREHEN METABOLIC PANEL: CPT

## 2019-04-01 PROCEDURE — 85025 COMPLETE CBC W/AUTO DIFF WBC: CPT

## 2019-04-01 PROCEDURE — 96374 THER/PROPH/DIAG INJ IV PUSH: CPT

## 2019-04-01 PROCEDURE — 2580000300 HC RX 258: Performed by: INTERNAL MEDICINE

## 2019-04-01 PROCEDURE — 36415 COLL VENOUS BLD VENIPUNCTURE: CPT

## 2019-04-01 PROCEDURE — 6360000200 HC RX 636 W HCPCS (ALT 250 FOR IP): Performed by: INTERNAL MEDICINE

## 2019-04-01 PROCEDURE — G0463 HOSPITAL OUTPT CLINIC VISIT: HCPCS

## 2019-04-01 PROCEDURE — 96375 TX/PRO/DX INJ NEW DRUG ADDON: CPT

## 2019-04-01 PROCEDURE — 99214 OFFICE O/P EST MOD 30 MIN: CPT | Performed by: INTERNAL MEDICINE

## 2019-04-01 PROCEDURE — 2500000200 HC RX 250 WO HCPCS: Performed by: INTERNAL MEDICINE

## 2019-04-01 PROCEDURE — 96416 CHEMO PROLONG INFUSE W/PUMP: CPT

## 2019-04-01 RX ORDER — PALONOSETRON 0.05 MG/ML
0.25 INJECTION, SOLUTION INTRAVENOUS ONCE
Status: COMPLETED | OUTPATIENT
Start: 2019-04-01 | End: 2019-04-01

## 2019-04-01 RX ORDER — FAMOTIDINE 10 MG/ML
20 INJECTION INTRAVENOUS ONCE
Status: CANCELLED
Start: 2019-04-01 | End: 2019-04-01

## 2019-04-01 RX ORDER — PALONOSETRON 0.05 MG/ML
0.25 INJECTION, SOLUTION INTRAVENOUS ONCE
Status: CANCELLED | OUTPATIENT
Start: 2019-04-01

## 2019-04-01 RX ORDER — DIPHENHYDRAMINE HYDROCHLORIDE 50 MG/ML
50 INJECTION INTRAMUSCULAR; INTRAVENOUS ONCE
Status: CANCELLED
Start: 2019-04-01 | End: 2019-04-01

## 2019-04-01 RX ORDER — DIPHENHYDRAMINE HYDROCHLORIDE 50 MG/ML
50 INJECTION INTRAMUSCULAR; INTRAVENOUS ONCE AS NEEDED
Status: CANCELLED | OUTPATIENT
Start: 2019-04-01

## 2019-04-01 RX ORDER — LORAZEPAM 2 MG/ML
1 INJECTION INTRAMUSCULAR ONCE AS NEEDED
Status: CANCELLED | OUTPATIENT
Start: 2019-04-01

## 2019-04-01 RX ORDER — DEXTROSE MONOHYDRATE 50 MG/ML
50 INJECTION, SOLUTION INTRAVENOUS AS NEEDED
Status: DISCONTINUED | OUTPATIENT
Start: 2019-04-01 | End: 2019-04-01 | Stop reason: HOSPADM

## 2019-04-01 RX ORDER — DEXTROSE MONOHYDRATE 50 MG/ML
50 INJECTION, SOLUTION INTRAVENOUS AS NEEDED
Status: CANCELLED | OUTPATIENT
Start: 2019-04-01

## 2019-04-01 RX ADMIN — PALONOSETRON HYDROCHLORIDE 0.25 MG: 0.25 INJECTION INTRAVENOUS at 08:52

## 2019-04-01 RX ADMIN — FLUOROURACIL 2800 MG: 50 INJECTION, SOLUTION INTRAVENOUS at 09:21

## 2019-04-01 RX ADMIN — DEXAMETHASONE SODIUM PHOSPHATE 12 MG: 10 INJECTION, SOLUTION INTRAMUSCULAR; INTRAVENOUS at 08:53

## 2019-04-01 RX ADMIN — DEXTROSE MONOHYDRATE 50 ML/HR: 50 INJECTION, SOLUTION INTRAVENOUS at 08:35

## 2019-04-01 ASSESSMENT — PAIN SCALES - GENERAL: PAINLEVEL: 0-NO PAIN

## 2019-04-01 NOTE — PROGRESS NOTES
Medical Oncology Follow-Up    Subjective   HISTORY OF PRESENT ILLNESS:  Shama Caballero is a 73 y.o. female who was diagnosed with colon cancer in October 2018 when she presented with 2-year history of anemia and more recent history of right lower quadrant abdominal pain.  She initially underwent a colonoscopy which revealed the presence of colon cancer in ascending colon. She underwent resection on October 25, 2018.  Surgical margins were negative, the tumor extended through pericolonic tissues.  There were 19 lymph nodes removed in 1 was metastatic.  Pathologically was a [id number] colon cancer.  Because of her high risk for recurrence adjuvant chemotherapy with FOLFOX over a period of 6 months was recommended.  After the first cycle she had neutropenia which required Neupogen and discontinuation of 5-FU injection.  With the second cycle she had a reaction to oxaliplatin requiring steroid premedication the night before and morning of chemotherapy.    She has had a measurable time the last 2 weeks.  She has been very fatigued and has had nowhere near the recovery of previous cycles.  She can still buttoning buttons but the neuropathy has gotten to the point where she can barely do this.  Please see under assessment.          Encounter Diagnosis   Name Primary?   • Malignant neoplasm of ascending colon (CMS/HCC) (HCC)    .     Treatment History:   No history exists.         RECENT EVENTS:    PAST MEDICAL HISTORY:   Past Medical History:   Diagnosis Date   • Blood disorder     anemic   • Complication of anesthesia    • Diabetes mellitus (CMS/HCC) (HCC)    • Hypertension    • Malignant neoplasm of ascending colon (CMS/HCC) (HCC) 10/19/2018   • PONV (postoperative nausea and vomiting)    • Shortness of breath     low iron        GYNECOLOGICAL HISTORY (if applicable): NA     FAMILY HISTORY:   Family History   Problem Relation Age of Onset   • Hypertension Mother    • COPD Mother    • Hypertension Father    • Brain  Aneurysm Sister    • Cancer Maternal Grandmother 36        SOCIAL HISTORY:   Social History     Socioeconomic History   • Marital status:      Spouse name: Not on file   • Number of children: Not on file   • Years of education: Not on file   • Highest education level: Not on file   Social Needs   • Financial resource strain: Not on file   • Food insecurity - worry: Not on file   • Food insecurity - inability: Not on file   • Transportation needs - medical: Not on file   • Transportation needs - non-medical: Not on file   Occupational History   • Not on file   Tobacco Use   • Smoking status: Never Smoker   • Smokeless tobacco: Never Used   Substance and Sexual Activity   • Alcohol use: Yes     Alcohol/week: 4.2 oz     Types: 7 Glasses of wine per week     Comment: WEEKLY   • Drug use: No   • Sexual activity: Not on file   Other Topics Concern   • Not on file   Social History Narrative   • Not on file        ALLERGIES:   Allergies as of 04/01/2019   • (No Known Allergies)        MEDICATIONS:   Current Outpatient Medications   Medication Sig Dispense Refill   • prochlorperazine (COMPAZINE) 10 mg tablet      • losartan (COZAAR) 50 mg tablet TAKE 1 TABLET BY MOUTH EVERY DAY 90 tablet 0   • atorvastatin (LIPITOR) 20 mg tablet TAKE 1 TABLET BY MOUTH EVERY DAY 90 tablet 0   • metFORMIN (GLUCOPHAGE) 500 mg tablet TAKE 1 TABLET BY MOUTH TWICE DAILY WITH MEALS 180 tablet 0   • dexamethasone (DECADRON) 4 mg tablet Take 20 mg PO the night before and morning of Oxaliplatin to prevent reaction. Take with food. 20 tablet 3   • lidocaine-prilocaine (EMLA) cream Apply 1 application topically as needed for pain scale 1-3/10 Apply over port site 1 hour before scheduled access 30 g 2   • lidocaine-prilocaine (EMLA) cream Apply thick layer to intact skin over port one hour prior to procedure. Cover with occlusive dressing. 30 g 11   • sodium chloride injection Infuse 10 mL into a venous catheter as needed (to flush port) Syringe  #1 20 mL 11   • heparin, porcine, PF, 100 unit/mL syringe Infuse 5 mL into a venous catheter as needed (to flush port) Syringe #2 10 mL 11   • acetaminophen (TYLENOL) 500 mg tablet Take 2 tablets (1,000 mg total) by mouth every 8 (eight) hours as needed for pain scale 1-3/10.  0   • sennosides-docusate sodium (SENNA WITH DOCUSATE SODIUM) 8.6-50 mg Take 1 tablet by mouth 2 (two) times a day as needed for constipation. Please take 1 tab BID while on narcotic pain medication to prevent constipation 30 tablet 0   • cholecalciferol, vitamin D3, (cholecalciferol) 1,000 unit tablet Take 2,000 Units by mouth daily.     • ferrous sulfate (SLOW FE) 142 mg (45 mg iron) tablet extended release Take 1 tablet by mouth daily.     • lancing device with lancets (ACCU-CHEK FASTCLIX) kit Use as directed to check blood sugars twice daily. 100 each 3   • lancets (ACCU-CHEK FASTCLIX) [facility] [place] USE BID TO TEST BLOOD SUGAR LEVELS 204 1   • blood-glucose meter (ACCU-CHEK JOSE MANUEL) misc FPD 1 0     No current facility-administered medications for this visit.        REVIEW OF SYSTEMS:   Review of Systems - Oncology    The ECOG performance status today is 0 - Fully active without restriction.          Objective    PHYSICAL EXAM:   /81   Pulse 103   Temp 36.6 °C (97.8 °F) (Temporal)   Wt 49.7 kg (109 lb 9.6 oz)   SpO2 97%   BMI 20.05 kg/m²    Body mass index is 20.05 kg/m².       Physical Exam reveals a pleasant but concerned female.  HEENT normocephalic atraumatic.  Eyes extraocular motions intact.  Mouth no concerning lesions.  Lymph no adenopathy of the cervical, supraclavicular or infraclavicular lymph nodes.  Lungs clear to auscultation.  Cardiovascular S1-S2 are appreciated.  Abdomen is soft, benign, without palpable or percussible hepatosplenomegaly.  Extremities are without clubbing cyanosis or edema.  Skin is without concerning lesions.  Neurologic cranial nerves II through XII intact.  Upper extremity strength in all 4 extremity  reflexes are equal normal bilaterally.    DIAGNOSTIC REPORTS REVIEWED:   Imaging: None.  Laboratory/Pathology: CBC and CMP have a number of abnormalities but none are concerning for either disease progression or dramatic chemotherapy side effects.  Procedure: None.         Assessment/Plan    IMPRESSION:   #1 single node positive colon cancer.  Patient is subjectively, objectively and by labs without clear and unequivocal evidence of disease recurrence.  2.  Adjuvant FOLFOX now being tolerated extremely poorly.  I think most of the side effects are due to the oxaliplatinum and I have strongly recommended that we stop this part of the therapy.  I have recommended we continue with the infusional 5-FU which is the most important part of this regimen.  Patient enthusiastically agrees.    PLAN:   I would hope that she can get through the next 4 cycles with just infusional 5-FU which is what I have ordered.  Further dose reductions on oxaliplatinum would be inappropriate and oxaliplatinum clearly adds to the 5-FU but very little and she is already received 8 cycles which is the average that most patients tolerate.         Physician: Tito Morris MD

## 2019-04-03 ENCOUNTER — HOSPITAL ENCOUNTER (OUTPATIENT)
Dept: INFUSION THERAPY | Facility: HOSPITAL | Age: 74
Setting detail: INFUSION SERIES
Discharge: STILL ADMITTED OR ADMINISTRATIVE | End: 2019-04-03
Payer: MEDICARE

## 2019-04-03 VITALS
RESPIRATION RATE: 16 BRPM | HEART RATE: 92 BPM | DIASTOLIC BLOOD PRESSURE: 65 MMHG | SYSTOLIC BLOOD PRESSURE: 154 MMHG | TEMPERATURE: 97.9 F

## 2019-04-03 DIAGNOSIS — C18.2 MALIGNANT NEOPLASM OF ASCENDING COLON (CMS/HCC): Primary | ICD-10-CM

## 2019-04-03 DIAGNOSIS — K63.89 MASS OF CECUM: ICD-10-CM

## 2019-04-03 PROCEDURE — 6360000200 HC RX 636 W HCPCS (ALT 250 FOR IP): Performed by: NURSE PRACTITIONER

## 2019-04-03 PROCEDURE — 96523 IRRIG DRUG DELIVERY DEVICE: CPT

## 2019-04-03 PROCEDURE — G0463 HOSPITAL OUTPT CLINIC VISIT: HCPCS

## 2019-04-03 RX ORDER — SODIUM CHLORIDE 0.9 % (FLUSH) 0.9 %
10 SYRINGE (ML) INJECTION AS NEEDED
Status: DISCONTINUED | OUTPATIENT
Start: 2019-04-03 | End: 2019-04-04 | Stop reason: HOSPADM

## 2019-04-03 RX ORDER — HEPARIN 100 UNIT/ML
500 SYRINGE INTRAVENOUS AS NEEDED
Status: DISCONTINUED | OUTPATIENT
Start: 2019-04-03 | End: 2019-04-04 | Stop reason: HOSPADM

## 2019-04-03 RX ORDER — SODIUM CHLORIDE 0.9 % (FLUSH) 0.9 %
10 SYRINGE (ML) INJECTION AS NEEDED
Status: CANCELLED | OUTPATIENT
Start: 2019-04-15

## 2019-04-03 RX ORDER — HEPARIN 100 UNIT/ML
500 SYRINGE INTRAVENOUS AS NEEDED
Status: CANCELLED | OUTPATIENT
Start: 2019-04-15

## 2019-04-03 RX ADMIN — Medication 5 ML: at 08:15

## 2019-04-03 ASSESSMENT — PAIN SCALES - GENERAL: PAINLEVEL: 0-NO PAIN

## 2019-04-15 ENCOUNTER — APPOINTMENT (OUTPATIENT)
Dept: ONCOLOGY | Facility: CLINIC | Age: 74
End: 2019-04-15
Payer: MEDICARE

## 2019-04-15 ENCOUNTER — OFFICE VISIT (OUTPATIENT)
Dept: ONCOLOGY | Facility: CLINIC | Age: 74
End: 2019-04-15
Payer: MEDICARE

## 2019-04-15 ENCOUNTER — INFUSION (OUTPATIENT)
Dept: ONCOLOGY | Facility: CLINIC | Age: 74
End: 2019-04-15
Payer: MEDICARE

## 2019-04-15 VITALS
SYSTOLIC BLOOD PRESSURE: 112 MMHG | WEIGHT: 114 LBS | HEART RATE: 85 BPM | BODY MASS INDEX: 20.85 KG/M2 | OXYGEN SATURATION: 97 % | TEMPERATURE: 98.6 F | DIASTOLIC BLOOD PRESSURE: 62 MMHG

## 2019-04-15 DIAGNOSIS — C18.2 MALIGNANT NEOPLASM OF ASCENDING COLON (CMS/HCC): ICD-10-CM

## 2019-04-15 DIAGNOSIS — C18.2 MALIGNANT NEOPLASM OF ASCENDING COLON (CMS/HCC): Primary | ICD-10-CM

## 2019-04-15 LAB
ALBUMIN SERPL-MCNC: 3.8 G/DL (ref 3.5–5.3)
ALP SERPL-CCNC: 106 U/L (ref 55–142)
ALT SERPL-CCNC: 21 U/L (ref 0–52)
ANION GAP SERPL CALC-SCNC: 8 MMOL/L (ref 3–11)
ANISOCYTOSIS PRESENCE IN BLOOD, ANALYZER: ABNORMAL
AST SERPL-CCNC: 38 U/L (ref 0–39)
BASOPHILS # BLD AUTO: 0.1 10*3/UL
BASOPHILS NFR BLD AUTO: 2 % (ref 0–2)
BILIRUB SERPL-MCNC: 0.67 MG/DL (ref 0–1.4)
BUN SERPL-MCNC: 8 MG/DL (ref 7–25)
CALCIUM ALBUM COR SERPL-MCNC: 9.2 MG/DL (ref 8.6–10.3)
CALCIUM SERPL-MCNC: 9 MG/DL (ref 8.6–10.3)
CHLORIDE SERPL-SCNC: 106 MMOL/L (ref 98–107)
CO2 SERPL-SCNC: 24 MMOL/L (ref 21–32)
CREAT SERPL-MCNC: 0.52 MG/DL (ref 0.6–1.2)
EOSINOPHIL # BLD AUTO: 0 10*3/UL
EOSINOPHIL NFR BLD AUTO: 1 % (ref 0–3)
ERYTHROCYTE [DISTWIDTH] IN BLOOD BY AUTOMATED COUNT: 22.4 % (ref 11.5–14)
GFR SERPL CREATININE-BSD FRML MDRD: 96 ML/MIN/1.73M*2
GLUCOSE SERPL-MCNC: 170 MG/DL (ref 70–105)
HCT VFR BLD AUTO: 39 % (ref 34–45)
HGB BLD-MCNC: 13 G/DL (ref 11.5–15.5)
LYMPHOCYTES # BLD AUTO: 0.9 10*3/UL
LYMPHOCYTES NFR BLD AUTO: 32 % (ref 11–47)
MCH RBC QN AUTO: 32.7 PG (ref 28–33)
MCHC RBC AUTO-ENTMCNC: 33.3 G/DL (ref 32–36)
MCV RBC AUTO: 98 FL (ref 81–97)
MONOCYTES # BLD AUTO: 0.4 10*3/UL
MONOCYTES NFR BLD AUTO: 15 % (ref 3–11)
NEUTROPHILS # BLD AUTO: 1.5 10*3/UL
NEUTROPHILS NFR BLD AUTO: 50 % (ref 41–81)
PLATELET # BLD AUTO: 125 10*3/UL (ref 140–350)
PMV BLD AUTO: 7.4 FL (ref 6.9–10.8)
POTASSIUM SERPL-SCNC: 3.6 MMOL/L (ref 3.5–5.1)
PROT SERPL-MCNC: 5.9 G/DL (ref 6–8.3)
RBC # BLD AUTO: 3.98 10*6/ΜL (ref 3.7–5.3)
SODIUM SERPL-SCNC: 138 MMOL/L (ref 135–145)
WBC # BLD AUTO: 2.9 10*3/UL (ref 4.5–10.5)

## 2019-04-15 PROCEDURE — G0463 HOSPITAL OUTPT CLINIC VISIT: HCPCS

## 2019-04-15 PROCEDURE — 36415 COLL VENOUS BLD VENIPUNCTURE: CPT

## 2019-04-15 PROCEDURE — 96375 TX/PRO/DX INJ NEW DRUG ADDON: CPT

## 2019-04-15 PROCEDURE — 85025 COMPLETE CBC W/AUTO DIFF WBC: CPT

## 2019-04-15 PROCEDURE — 99214 OFFICE O/P EST MOD 30 MIN: CPT | Performed by: INTERNAL MEDICINE

## 2019-04-15 PROCEDURE — 6360000200 HC RX 636 W HCPCS (ALT 250 FOR IP): Performed by: INTERNAL MEDICINE

## 2019-04-15 PROCEDURE — 96374 THER/PROPH/DIAG INJ IV PUSH: CPT | Mod: 59

## 2019-04-15 PROCEDURE — 96416 CHEMO PROLONG INFUSE W/PUMP: CPT

## 2019-04-15 PROCEDURE — 80053 COMPREHEN METABOLIC PANEL: CPT

## 2019-04-15 PROCEDURE — 2500000200 HC RX 250 WO HCPCS: Performed by: INTERNAL MEDICINE

## 2019-04-15 PROCEDURE — 2580000300 HC RX 258: Performed by: INTERNAL MEDICINE

## 2019-04-15 RX ORDER — DEXTROSE MONOHYDRATE 50 MG/ML
50 INJECTION, SOLUTION INTRAVENOUS AS NEEDED
Status: CANCELLED | OUTPATIENT
Start: 2019-04-15

## 2019-04-15 RX ORDER — DIPHENHYDRAMINE HYDROCHLORIDE 50 MG/ML
50 INJECTION INTRAMUSCULAR; INTRAVENOUS ONCE AS NEEDED
Status: CANCELLED | OUTPATIENT
Start: 2019-04-15

## 2019-04-15 RX ORDER — LORAZEPAM 2 MG/ML
1 INJECTION INTRAMUSCULAR ONCE AS NEEDED
Status: CANCELLED | OUTPATIENT
Start: 2019-04-15

## 2019-04-15 RX ORDER — PALONOSETRON 0.05 MG/ML
0.25 INJECTION, SOLUTION INTRAVENOUS ONCE
Status: CANCELLED | OUTPATIENT
Start: 2019-04-15

## 2019-04-15 RX ORDER — PALONOSETRON 0.05 MG/ML
0.25 INJECTION, SOLUTION INTRAVENOUS ONCE
Status: COMPLETED | OUTPATIENT
Start: 2019-04-15 | End: 2019-04-15

## 2019-04-15 RX ADMIN — PALONOSETRON HYDROCHLORIDE 0.25 MG: 0.25 INJECTION, SOLUTION INTRAVENOUS at 09:19

## 2019-04-15 RX ADMIN — FLUOROURACIL 2800 MG: 50 INJECTION, SOLUTION INTRAVENOUS at 09:46

## 2019-04-15 RX ADMIN — DEXAMETHASONE SODIUM PHOSPHATE 12 MG: 10 INJECTION, SOLUTION INTRAMUSCULAR; INTRAVENOUS at 09:23

## 2019-04-15 ASSESSMENT — PAIN SCALES - GENERAL: PAINLEVEL: 0-NO PAIN

## 2019-04-15 NOTE — PROGRESS NOTES
Medical Oncology Follow-Up    Date of Service: 4/15/2019    Subjective   HISTORY OF PRESENT ILLNESS:  This is a 73-year-old female with colon cancer.  The patient returns oncology clinic for follow-up and adjuvant chemotherapy.     The patient was diagnosed with colon cancer in October 2018 when she presented with 2-year history of anemia and more recent history of right lower quadrant abdominal pain.  She initially underwent a colonoscopy which revealed the presence of colon cancer in ascending colon. She underwent resection on October 25, 2018.  Surgical margins were negative, the tumor extended through pericolonic tissues.  There were 19 lymph nodes removed in 1 was metastatic.  Pathologically was a [id number] colon cancer.  After she recovered from surgery, I saw the patient for consultation in November 2018. Because of her high risk for recurrence I recommended adjuvant chemotherapy with FOLFOX over a period of 6 months.  After the first cycle she had neutropenia which required Neupogen and discontinuation of 5-FU injection.  With the second cycle she had a reaction to oxaliplatin requiring steroid premedication the night before and morning of chemotherapy.    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  With this cycle 9, Dr. Morris decided to stop oxaliplatin altogether because of worsening neurotoxicity and other side effects.    The patient states that she did well with the last cycle of chemotherapy after oxaliplatin was discontinued.  She still has some mild numbness at fingertips and toes but they are not any worse.  She also felt less fatigued with the last cycle of chemotherapy.  She had some loose bowel movements but no severe diarrhea and she denies other abdominal symptoms such as pain, nausea, vomiting, hematemesis, melena or bright red blood per rectum.  She has not lost any weight.              PAST MEDICAL HISTORY:   Past Medical History:   Diagnosis  Date   • Blood disorder     anemic   • Complication of anesthesia    • Diabetes mellitus (CMS/HCC) (HCC)    • Hypertension    • Malignant neoplasm of ascending colon (CMS/HCC) (HCC) 10/19/2018   • PONV (postoperative nausea and vomiting)    • Shortness of breath     low iron        FAMILY HISTORY:   Family History   Problem Relation Age of Onset   • Hypertension Mother    • COPD Mother    • Hypertension Father    • Brain Aneurysm Sister    • Cancer Maternal Grandmother 36        SOCIAL HISTORY:   Social History     Socioeconomic History   • Marital status:      Spouse name: Not on file   • Number of children: Not on file   • Years of education: Not on file   • Highest education level: Not on file   Social Needs   • Financial resource strain: Not on file   • Food insecurity - worry: Not on file   • Food insecurity - inability: Not on file   • Transportation needs - medical: Not on file   • Transportation needs - non-medical: Not on file   Occupational History   • Not on file   Tobacco Use   • Smoking status: Never Smoker   • Smokeless tobacco: Never Used   Substance and Sexual Activity   • Alcohol use: Yes     Alcohol/week: 4.2 oz     Types: 7 Glasses of wine per week     Comment: WEEKLY   • Drug use: No   • Sexual activity: Not on file   Other Topics Concern   • Not on file   Social History Narrative   • Not on file        ALLERGIES:   Allergies as of 04/15/2019   • (No Known Allergies)        MEDICATIONS:   Current Outpatient Medications   Medication Sig Dispense Refill   • prochlorperazine (COMPAZINE) 10 mg tablet      • losartan (COZAAR) 50 mg tablet TAKE 1 TABLET BY MOUTH EVERY DAY 90 tablet 0   • atorvastatin (LIPITOR) 20 mg tablet TAKE 1 TABLET BY MOUTH EVERY DAY 90 tablet 0   • metFORMIN (GLUCOPHAGE) 500 mg tablet TAKE 1 TABLET BY MOUTH TWICE DAILY WITH MEALS 180 tablet 0   • lidocaine-prilocaine (EMLA) cream Apply 1 application topically as needed for pain scale 1-3/10 Apply over port site 1 hour  before scheduled access 30 g 2   • sodium chloride injection Infuse 10 mL into a venous catheter as needed (to flush port) Syringe #1 20 mL 11   • heparin, porcine, PF, 100 unit/mL syringe Infuse 5 mL into a venous catheter as needed (to flush port) Syringe #2 10 mL 11   • acetaminophen (TYLENOL) 500 mg tablet Take 2 tablets (1,000 mg total) by mouth every 8 (eight) hours as needed for pain scale 1-3/10.  0   • sennosides-docusate sodium (SENNA WITH DOCUSATE SODIUM) 8.6-50 mg Take 1 tablet by mouth 2 (two) times a day as needed for constipation. Please take 1 tab BID while on narcotic pain medication to prevent constipation 30 tablet 0   • cholecalciferol, vitamin D3, (cholecalciferol) 1,000 unit tablet Take 2,000 Units by mouth daily.     • ferrous sulfate (SLOW FE) 142 mg (45 mg iron) tablet extended release Take 1 tablet by mouth daily.     • lancing device with lancets (ACCU-CHEK FASTCLIX) kit Use as directed to check blood sugars twice daily. 100 each 3   • lancets (ACCU-CHEK FASTCLIX) [facility] [place] USE BID TO TEST BLOOD SUGAR LEVELS 204 1   • blood-glucose meter (ACCU-CHEK JOSE MANUEL) misc FPD 1 0   • dexamethasone (DECADRON) 4 mg tablet Take 20 mg PO the night before and morning of Oxaliplatin to prevent reaction. Take with food. (Patient not taking: Reported on 4/3/2019 ) 20 tablet 3   • lidocaine-prilocaine (EMLA) cream Apply thick layer to intact skin over port one hour prior to procedure. Cover with occlusive dressing. (Patient not taking: Reported on 4/3/2019 ) 30 g 11     No current facility-administered medications for this visit.        REVIEW OF SYSTEMS:   14 point systems review was done.  It was negative with the exception of fatigue, loose bowel movements, numbness and tingling involving fingertips and toes    The ECOG performance status today is 0          Objective    PHYSICAL EXAM:   /62   Pulse 85   Temp 37 °C (98.6 °F) (Temporal)   Wt 51.7 kg (114 lb)   SpO2 97%   BMI 20.85 kg/m²    Body mass  index is 20.85 kg/m².     Patient  looked  fairly well.  No distress.      HEENT: No scleral icterus.  No oral or pharyngeal lesions     Ln: No lymphadenopathy neck axilla or groin     Lungs: Clear.  No rales, rhonchi or wheezing     CVS: Regular rate and rhythm.  No S3.  No friction rub     Abdomen: Benign nontender.  No hepatosplenomegaly.  No palpable abnormalities     Ext: No edema.  Pulses intact     Musculoskeletal: No muscle or bone tenderness including spine pelvis     Skin: No petechia purpura rash     Neuro: Alert oriented ×3.  No focal deficits.               Physical Exam    DIAGNOSTIC REPORTS REVIEWED:   CBC today shows a white cell count of 2.9, hemoglobin of 13.0 and platelet count of 125,000.  Absolute neutrophil count was 1.5.  The comprehensive metabolic panel was normal with the exception of elevated glucose at 170.           Assessment/Plan    IMPRESSION and PLAN:   This is a 73-year-old female with stage III colon cancer.  The patient returns oncology clinic for follow-up and adjuvant chemotherapy.    The patient appears to have done much better after oxaliplatin was discontinued from her treatment plan.  Her neuropathy did not progress further and overall she felt better as well.  Currently she does not have any new/acute issues.  Her labs are within normal/acceptable limits.  Her neutrophil count is borderline in spite of discontinuation of oxaliplatin.  As a result, I will support her with Neulasta with this cycle which will be her cycle #10.  I am hoping that she will be able to finish her adjuvant chemotherapy without any further issues problems.    I had a fairly long conversation with the patient and her .  I reviewed her labs.  Once again I reviewed the treatment plan and answered her questions.  The patient verbalized her understanding and approval of ongoing therapy and my recommendations.  As a result, she will return to oncology clinic in 2 weeks for cycle #11.                Physician: M. BEHNAN SAHIN, MD

## 2019-04-17 ENCOUNTER — HOSPITAL ENCOUNTER (OUTPATIENT)
Dept: INFUSION THERAPY | Facility: HOSPITAL | Age: 74
Setting detail: INFUSION SERIES
Discharge: STILL ADMITTED OR ADMINISTRATIVE | End: 2019-04-17
Payer: MEDICARE

## 2019-04-17 VITALS — SYSTOLIC BLOOD PRESSURE: 127 MMHG | HEART RATE: 73 BPM | DIASTOLIC BLOOD PRESSURE: 58 MMHG | RESPIRATION RATE: 20 BRPM

## 2019-04-17 DIAGNOSIS — K63.89 MASS OF CECUM: ICD-10-CM

## 2019-04-17 DIAGNOSIS — C18.2 MALIGNANT NEOPLASM OF ASCENDING COLON (CMS/HCC): Primary | ICD-10-CM

## 2019-04-17 PROCEDURE — G0463 HOSPITAL OUTPT CLINIC VISIT: HCPCS

## 2019-04-17 PROCEDURE — 6360000200 HC RX 636 W HCPCS (ALT 250 FOR IP): Performed by: NURSE PRACTITIONER

## 2019-04-17 PROCEDURE — 96523 IRRIG DRUG DELIVERY DEVICE: CPT

## 2019-04-17 RX ORDER — HEPARIN 100 UNIT/ML
500 SYRINGE INTRAVENOUS AS NEEDED
Status: CANCELLED | OUTPATIENT
Start: 2019-04-29

## 2019-04-17 RX ORDER — HEPARIN 100 UNIT/ML
500 SYRINGE INTRAVENOUS AS NEEDED
Status: DISCONTINUED | OUTPATIENT
Start: 2019-04-17 | End: 2019-04-18 | Stop reason: HOSPADM

## 2019-04-17 RX ORDER — SODIUM CHLORIDE 0.9 % (FLUSH) 0.9 %
10 SYRINGE (ML) INJECTION AS NEEDED
Status: DISCONTINUED | OUTPATIENT
Start: 2019-04-17 | End: 2019-04-18 | Stop reason: HOSPADM

## 2019-04-17 RX ORDER — SODIUM CHLORIDE 0.9 % (FLUSH) 0.9 %
10 SYRINGE (ML) INJECTION AS NEEDED
Status: CANCELLED | OUTPATIENT
Start: 2019-04-29

## 2019-04-17 RX ADMIN — Medication 10 ML: at 08:34

## 2019-04-17 RX ADMIN — HEPARIN 5 ML: 100 SYRINGE at 08:34

## 2019-04-17 ASSESSMENT — PAIN SCALES - GENERAL: PAINLEVEL: 0-NO PAIN

## 2019-04-18 ENCOUNTER — HOSPITAL ENCOUNTER (OUTPATIENT)
Dept: INFUSION THERAPY | Facility: HOSPITAL | Age: 74
Setting detail: INFUSION SERIES
Discharge: STILL ADMITTED OR ADMINISTRATIVE | End: 2019-04-18
Payer: MEDICARE

## 2019-04-18 VITALS
SYSTOLIC BLOOD PRESSURE: 109 MMHG | TEMPERATURE: 97.9 F | RESPIRATION RATE: 16 BRPM | HEART RATE: 86 BPM | DIASTOLIC BLOOD PRESSURE: 53 MMHG

## 2019-04-18 DIAGNOSIS — C18.2 MALIGNANT NEOPLASM OF ASCENDING COLON (CMS/HCC): Primary | ICD-10-CM

## 2019-04-18 PROCEDURE — 96372 THER/PROPH/DIAG INJ SC/IM: CPT

## 2019-04-18 PROCEDURE — 6360000200 HC RX 636 W HCPCS (ALT 250 FOR IP): Mod: TB | Performed by: INTERNAL MEDICINE

## 2019-04-18 RX ADMIN — PEGFILGRASTIM 6 MG: 6 INJECTION SUBCUTANEOUS at 07:59

## 2019-04-18 ASSESSMENT — PAIN SCALES - GENERAL: PAINLEVEL: 0-NO PAIN

## 2019-04-28 NOTE — PROGRESS NOTES
Oncology Progress Note      Chief Complaint:    Shama Caballero is a 73 y.o. female with   1. Malignant neoplasm of ascending colon (CMS/HCC) (HCC)    , here for cycle 11 of adjuvant 5-FU therapy.    History of Present Illness:    The patient was diagnosed with colon cancer in October 2018 when she presented with 2-year history of anemia and more recent history of right lower quadrant abdominal pain.  She initially underwent a colonoscopy which revealed the presence of colon cancer in ascending colon. She underwent resection on October 25, 2018.  Surgical margins were negative, the tumor extended through pericolonic tissues.  There were 19 lymph nodes removed and 1 was metastatic.  Pathologically it was a [id number] colon cancer.  She was seen for initial consult in November 2018. Because of her high risk for recurrence she was recommended adjuvant chemotherapy with FOLFOX over a period of 6 months.  After the first cycle she had neutropenia which required Neupogen and discontinuation of 5-FU injection.  With the second cycle she had a reaction to oxaliplatin requiring steroid premedication the night before and morning of chemotherapy.    Subsequently she did well, however after 8 cycles of therapy she developed worsening neurotoxicity from oxaliplatin and this was discontinued.    She is here today to consider cycle 11 of 5-FU therapy.  She denies any specific new complaints over the past couple weeks.  She has mild loose stools following chemotherapy that has been relieved by Imodium therapy.  Fatigue always worsens after treatment but improves after the first week.  She has remaining numbness in her fingertips only this is not worsened.  She currently feels well.    Significant Comorbidity:   Past Medical History:   Diagnosis Date   • Blood disorder     anemic   • Complication of anesthesia    • Diabetes mellitus (CMS/HCC) (HCC)    • Hypertension    • Malignant neoplasm of ascending colon (CMS/HCC) (HCC)  10/19/2018   • PONV (postoperative nausea and vomiting)    • Shortness of breath     low iron     Past Surgical History:   Procedure Laterality Date   • CHOLECYSTECTOMY     • COLON SURGERY Right 10/25/2018     LAPAROSCOPIC RIGHT HEMICOLECTOMY- Dr Rodriguez   • COLONOSCOPY N/A 10/19/2018    Dr Rodriguez   • EYE SURGERY Bilateral     CATARACT   • HYSTERECTOMY     • PORTACATH PLACEMENT N/A 11/30/2018    Procedure: PORTACATH INSERTION;  Surgeon: Tremaine Rodriguez MD;  Location: [facility];  Service: General;  Laterality: N/A;   • TONSILLECTOMY       Social History:  Social History     Socioeconomic History   • Marital status:      Spouse name: Not on file   • Number of children: Not on file   • Years of education: Not on file   • Highest education level: Not on file   Social Needs   • Financial resource strain: Not on file   • Food insecurity - worry: Not on file   • Food insecurity - inability: Not on file   • Transportation needs - medical: Not on file   • Transportation needs - non-medical: Not on file   Occupational History   • Not on file   Tobacco Use   • Smoking status: Never Smoker   • Smokeless tobacco: Never Used   Substance and Sexual Activity   • Alcohol use: Yes     Alcohol/week: 4.2 oz     Types: 7 Glasses of wine per week     Comment: WEEKLY   • Drug use: No   • Sexual activity: Not on file   Other Topics Concern   • Not on file   Social History Narrative   • Not on file     Current Medications:  Current Outpatient Medications:   •  prochlorperazine (COMPAZINE) 10 mg tablet, , Disp: , Rfl:   •  losartan (COZAAR) 50 mg tablet, TAKE 1 TABLET BY MOUTH EVERY DAY (Patient taking differently: TAKE 1/2 TABLET BY MOUTH EVERY DAY), Disp: 90 tablet, Rfl: 0  •  atorvastatin (LIPITOR) 20 mg tablet, TAKE 1 TABLET BY MOUTH EVERY DAY, Disp: 90 tablet, Rfl: 0  •  metFORMIN (GLUCOPHAGE) 500 mg tablet, TAKE 1 TABLET BY MOUTH TWICE DAILY WITH MEALS, Disp: 180 tablet, Rfl: 0  •  lidocaine-prilocaine (EMLA) cream,  Apply 1 application topically as needed for pain scale 1-3/10 Apply over port site 1 hour before scheduled access, Disp: 30 g, Rfl: 2  •  lidocaine-prilocaine (EMLA) cream, Apply thick layer to intact skin over port one hour prior to procedure. Cover with occlusive dressing., Disp: 30 g, Rfl: 11  •  sodium chloride injection, Infuse 10 mL into a venous catheter as needed (to flush port) Syringe #1, Disp: 20 mL, Rfl: 11  •  heparin, porcine, PF, 100 unit/mL syringe, Infuse 5 mL into a venous catheter as needed (to flush port) Syringe #2, Disp: 10 mL, Rfl: 11  •  acetaminophen (TYLENOL) 500 mg tablet, Take 2 tablets (1,000 mg total) by mouth every 8 (eight) hours as needed for pain scale 1-3/10., Disp: , Rfl: 0  •  cholecalciferol, vitamin D3, (cholecalciferol) 1,000 unit tablet, Take 2,000 Units by mouth daily., Disp: , Rfl:   •  ferrous sulfate (SLOW FE) 142 mg (45 mg iron) tablet extended release, Take 1 tablet by mouth daily., Disp: , Rfl:   •  lancing device with lancets (ACCU-CHEK FASTCLIX) kit, Use as directed to check blood sugars twice daily., Disp: 100 each, Rfl: 3  •  lancets (ACCU-CHEK FASTCLIX) [facility], USE BID TO TEST BLOOD SUGAR LEVELS, Disp: 204, Rfl: 1  •  blood-glucose meter (ACCU-CHEK JOSE MANUEL) misc, [facility], Disp: 1, Rfl: 0  •  sennosides-docusate sodium (SENNA WITH DOCUSATE SODIUM) 8.6-50 mg, Take 1 tablet by mouth 2 (two) times a day as needed for constipation. Please take 1 tab BID while on narcotic pain medication to prevent constipation (Patient not taking: Reported on 4/29/2019 ), Disp: 30 tablet, Rfl: 0    Allergies:  No Known Allergies    Review of Systems:  Reported ECOG performance status of: 1 - Symptomatic but completely ambulatory  Review of Systems   Constitutional: Positive for fatigue (mild currently, moderate during the week after chemo).   Respiratory: Positive for cough (dry rare cough).    Gastrointestinal: Positive for diarrhea (mild r/b imodium s/p chemo).   Neurological: Positive for  headaches (mild r/b Tylenol) and numbness (fingertips only).   Psychiatric/Behavioral: Positive for sleep disturbance (worse with treatment).   All other systems reviewed and are negative.    PAIN: She denies any pain.    Physical Exam:  /58   Pulse 85   Temp 36.1 °C (97 °F) (Tympanic)   Wt 49.9 kg (110 lb)   SpO2 98%   BMI 20.12 kg/m²   Physical Exam   Constitutional: She is oriented to person, place, and time.   Well appearing and in no acute distress.   HENT:   Mucous membranes are intact.   Eyes: Conjunctivae are normal.   Sclera anicteric.   Cardiovascular: Normal rate, regular rhythm, S1 normal and S2 normal. Exam reveals no gallop and no friction rub.   No murmur heard.  Pulmonary/Chest: Effort normal and breath sounds normal. She has no wheezes. She has no rhonchi. She has no rales.   Abdominal: Soft. Bowel sounds are normal. She exhibits no distension. There is no hepatosplenomegaly. There is no tenderness.   Musculoskeletal: Normal range of motion. She exhibits no edema.   Lymphadenopathy:     She has no cervical adenopathy.     She has no axillary adenopathy.        Right: No supraclavicular adenopathy present.        Left: No supraclavicular adenopathy present.   Neurological: She is alert and oriented to person, place, and time.   Exam non-focal.   Skin: Skin is warm and dry. No ecchymosis and no rash noted. No cyanosis. Nails show no clubbing.   Psychiatric: She has a normal mood and affect.     Lab Studies:  CBC with differential:    Lab Results   Component Value Date    WBC 6.4 04/29/2019    HGB 13.5 04/29/2019    HCT 40.2 04/29/2019     (L) 04/29/2019    RBC 4.02 04/29/2019    MCV 99.8 (H) 04/29/2019    MCH 33.5 (H) 04/29/2019    MCHC 33.5 04/29/2019    RDW 20.0 (H) 04/29/2019    MPV 7.6 04/29/2019    NEUTROABS 5.00 04/29/2019     CMP:   Lab Results   Component Value Date     04/29/2019    K 3.6 04/29/2019     04/29/2019    CO2 26 04/29/2019    BUN 10 04/29/2019     CREATININE 0.50 (L) 04/29/2019    GLUCOSE 157 (H) 04/29/2019    CALCIUM 9.4 04/29/2019    PROT 6.2 04/29/2019    ALBUMIN 3.9 04/29/2019    AST 33 04/29/2019    ALT 26 04/29/2019    ALKPHOS 138 04/29/2019    BILITOT 0.54 04/29/2019     Imaging:  None performed today.    Impression/Plan:  1.  Shama Caballero is a 73 y.o. female with resected stage [id number] ascending colon adenocarcinoma: She remains clinically stable without evidence based on physical exam, review of systems, current labs of disease recurrence.  She has mild thrombocytopenia, but platelet count remains adequate for treatment.  We will proceed forth with cycle 11 of adjuvant therapy with continuous infusion 5-FU at 1920 mg per metered squared given over 46 hours.    2.  Previous iron deficiency: She continues on ferrous sulfate 1 tab daily.  We will recheck ferritin level at follow-up in 2 weeks.  3.  Mild chemotherapy-induced diarrhea: She does get adequate relief with Imodium therapy.  She will continue this as needed.  4.  Insomnia during therapy: We discussed trying over-the-counter treatments such as melatonin or Benadryl.  Will continue to monitor.  5.  She will return for follow-up in 2 weeks with CBC, CMP labs prior to her last cycle of treatment.  She was advised to call with any questions or concerns in the interim.    MARCOS GAMINO CNP

## 2019-04-29 ENCOUNTER — OFFICE VISIT (OUTPATIENT)
Dept: ONCOLOGY | Facility: CLINIC | Age: 74
End: 2019-04-29
Payer: MEDICARE

## 2019-04-29 ENCOUNTER — APPOINTMENT (OUTPATIENT)
Dept: ONCOLOGY | Facility: CLINIC | Age: 74
End: 2019-04-29
Payer: MEDICARE

## 2019-04-29 ENCOUNTER — INFUSION (OUTPATIENT)
Dept: ONCOLOGY | Facility: CLINIC | Age: 74
End: 2019-04-29
Payer: MEDICARE

## 2019-04-29 VITALS
HEART RATE: 85 BPM | TEMPERATURE: 97 F | WEIGHT: 110 LBS | SYSTOLIC BLOOD PRESSURE: 153 MMHG | OXYGEN SATURATION: 98 % | BODY MASS INDEX: 20.12 KG/M2 | DIASTOLIC BLOOD PRESSURE: 58 MMHG

## 2019-04-29 DIAGNOSIS — C18.2 MALIGNANT NEOPLASM OF ASCENDING COLON (CMS/HCC): Primary | ICD-10-CM

## 2019-04-29 DIAGNOSIS — C18.2 MALIGNANT NEOPLASM OF ASCENDING COLON (CMS/HCC): ICD-10-CM

## 2019-04-29 LAB
ALBUMIN SERPL-MCNC: 3.9 G/DL (ref 3.5–5.3)
ALP SERPL-CCNC: 138 U/L (ref 55–142)
ALT SERPL-CCNC: 26 U/L (ref 0–52)
ANION GAP SERPL CALC-SCNC: 7 MMOL/L (ref 3–11)
ANISOCYTOSIS PRESENCE IN BLOOD, ANALYZER: ABNORMAL
AST SERPL-CCNC: 33 U/L (ref 0–39)
BASOPHILS # BLD AUTO: 0 10*3/UL
BASOPHILS NFR BLD AUTO: 0 % (ref 0–2)
BILIRUB SERPL-MCNC: 0.54 MG/DL (ref 0–1.4)
BUN SERPL-MCNC: 10 MG/DL (ref 7–25)
CALCIUM ALBUM COR SERPL-MCNC: 9.5 MG/DL (ref 8.6–10.3)
CALCIUM SERPL-MCNC: 9.4 MG/DL (ref 8.6–10.3)
CHLORIDE SERPL-SCNC: 105 MMOL/L (ref 98–107)
CO2 SERPL-SCNC: 26 MMOL/L (ref 21–32)
CREAT SERPL-MCNC: 0.5 MG/DL (ref 0.6–1.2)
EOSINOPHIL # BLD AUTO: 0 10*3/UL
EOSINOPHIL NFR BLD AUTO: 1 % (ref 0–3)
ERYTHROCYTE [DISTWIDTH] IN BLOOD BY AUTOMATED COUNT: 20 % (ref 11.5–14)
GFR SERPL CREATININE-BSD FRML MDRD: 97 ML/MIN/1.73M*2
GLUCOSE SERPL-MCNC: 157 MG/DL (ref 70–105)
HCT VFR BLD AUTO: 40.2 % (ref 34–45)
HGB BLD-MCNC: 13.5 G/DL (ref 11.5–15.5)
LYMPHOCYTES # BLD AUTO: 1 10*3/UL
LYMPHOCYTES NFR BLD AUTO: 16 % (ref 11–47)
MCH RBC QN AUTO: 33.5 PG (ref 28–33)
MCHC RBC AUTO-ENTMCNC: 33.5 G/DL (ref 32–36)
MCV RBC AUTO: 99.8 FL (ref 81–97)
MONOCYTES # BLD AUTO: 0.3 10*3/UL
MONOCYTES NFR BLD AUTO: 5 % (ref 3–11)
NEUTROPHILS # BLD AUTO: 5 10*3/UL
NEUTROPHILS NFR BLD AUTO: 78 % (ref 41–81)
PLATELET # BLD AUTO: 109 10*3/UL (ref 140–350)
PMV BLD AUTO: 7.6 FL (ref 6.9–10.8)
POTASSIUM SERPL-SCNC: 3.6 MMOL/L (ref 3.5–5.1)
PROT SERPL-MCNC: 6.2 G/DL (ref 6–8.3)
RBC # BLD AUTO: 4.02 10*6/ΜL (ref 3.7–5.3)
SODIUM SERPL-SCNC: 138 MMOL/L (ref 135–145)
WBC # BLD AUTO: 6.4 10*3/UL (ref 4.5–10.5)

## 2019-04-29 PROCEDURE — 2500000200 HC RX 250 WO HCPCS: Performed by: NURSE PRACTITIONER

## 2019-04-29 PROCEDURE — 96374 THER/PROPH/DIAG INJ IV PUSH: CPT | Mod: 59

## 2019-04-29 PROCEDURE — 2580000300 HC RX 258: Performed by: NURSE PRACTITIONER

## 2019-04-29 PROCEDURE — 6360000200 HC RX 636 W HCPCS (ALT 250 FOR IP): Performed by: NURSE PRACTITIONER

## 2019-04-29 PROCEDURE — 96375 TX/PRO/DX INJ NEW DRUG ADDON: CPT

## 2019-04-29 PROCEDURE — 99214 OFFICE O/P EST MOD 30 MIN: CPT | Performed by: NURSE PRACTITIONER

## 2019-04-29 PROCEDURE — 80053 COMPREHEN METABOLIC PANEL: CPT

## 2019-04-29 PROCEDURE — 36415 COLL VENOUS BLD VENIPUNCTURE: CPT

## 2019-04-29 PROCEDURE — 96416 CHEMO PROLONG INFUSE W/PUMP: CPT

## 2019-04-29 PROCEDURE — G0463 HOSPITAL OUTPT CLINIC VISIT: HCPCS

## 2019-04-29 PROCEDURE — 85025 COMPLETE CBC W/AUTO DIFF WBC: CPT

## 2019-04-29 RX ORDER — SODIUM CHLORIDE 9 MG/ML
100 INJECTION, SOLUTION INTRAVENOUS CONTINUOUS
Status: CANCELLED
Start: 2019-04-29

## 2019-04-29 RX ORDER — SODIUM CHLORIDE 9 MG/ML
100 INJECTION, SOLUTION INTRAVENOUS CONTINUOUS
Status: DISCONTINUED | OUTPATIENT
Start: 2019-04-29 | End: 2019-04-29 | Stop reason: HOSPADM

## 2019-04-29 RX ORDER — PALONOSETRON 0.05 MG/ML
0.25 INJECTION, SOLUTION INTRAVENOUS ONCE
Status: CANCELLED | OUTPATIENT
Start: 2019-04-29

## 2019-04-29 RX ORDER — DIPHENHYDRAMINE HYDROCHLORIDE 50 MG/ML
50 INJECTION INTRAMUSCULAR; INTRAVENOUS ONCE AS NEEDED
Status: CANCELLED | OUTPATIENT
Start: 2019-04-29

## 2019-04-29 RX ORDER — PALONOSETRON 0.05 MG/ML
0.25 INJECTION, SOLUTION INTRAVENOUS ONCE
Status: COMPLETED | OUTPATIENT
Start: 2019-04-29 | End: 2019-04-29

## 2019-04-29 RX ORDER — LORAZEPAM 2 MG/ML
1 INJECTION INTRAMUSCULAR ONCE AS NEEDED
Status: CANCELLED | OUTPATIENT
Start: 2019-04-29

## 2019-04-29 RX ADMIN — SODIUM CHLORIDE 100 ML/HR: 9 INJECTION, SOLUTION INTRAVENOUS at 08:51

## 2019-04-29 RX ADMIN — FLUOROURACIL 2800 MG: 50 INJECTION, SOLUTION INTRAVENOUS at 09:17

## 2019-04-29 RX ADMIN — PALONOSETRON HYDROCHLORIDE 0.25 MG: 0.25 INJECTION, SOLUTION INTRAVENOUS at 08:51

## 2019-04-29 RX ADMIN — DEXAMETHASONE SODIUM PHOSPHATE 12 MG: 10 INJECTION, SOLUTION INTRAMUSCULAR; INTRAVENOUS at 08:55

## 2019-04-29 ASSESSMENT — ENCOUNTER SYMPTOMS
SLEEP DISTURBANCE: 1
FATIGUE: 1
DIARRHEA: 1
NUMBNESS: 1
COUGH: 1
HEADACHES: 1

## 2019-05-01 ENCOUNTER — HOSPITAL ENCOUNTER (OUTPATIENT)
Dept: INFUSION THERAPY | Facility: HOSPITAL | Age: 74
Setting detail: INFUSION SERIES
Discharge: STILL ADMITTED OR ADMINISTRATIVE | End: 2019-05-01
Payer: MEDICARE

## 2019-05-01 VITALS
RESPIRATION RATE: 22 BRPM | SYSTOLIC BLOOD PRESSURE: 156 MMHG | DIASTOLIC BLOOD PRESSURE: 61 MMHG | HEART RATE: 89 BPM | OXYGEN SATURATION: 96 %

## 2019-05-01 DIAGNOSIS — C18.2 MALIGNANT NEOPLASM OF ASCENDING COLON (CMS/HCC): Primary | ICD-10-CM

## 2019-05-01 DIAGNOSIS — K63.89 MASS OF CECUM: ICD-10-CM

## 2019-05-01 PROCEDURE — G0463 HOSPITAL OUTPT CLINIC VISIT: HCPCS

## 2019-05-01 PROCEDURE — 96523 IRRIG DRUG DELIVERY DEVICE: CPT

## 2019-05-01 PROCEDURE — 6360000200 HC RX 636 W HCPCS (ALT 250 FOR IP): Performed by: NURSE PRACTITIONER

## 2019-05-01 RX ORDER — HEPARIN 100 UNIT/ML
500 SYRINGE INTRAVENOUS AS NEEDED
Status: DISCONTINUED | OUTPATIENT
Start: 2019-05-01 | End: 2019-05-02 | Stop reason: HOSPADM

## 2019-05-01 RX ORDER — SODIUM CHLORIDE 0.9 % (FLUSH) 0.9 %
10 SYRINGE (ML) INJECTION AS NEEDED
Status: CANCELLED | OUTPATIENT
Start: 2019-05-13

## 2019-05-01 RX ORDER — HEPARIN 100 UNIT/ML
500 SYRINGE INTRAVENOUS AS NEEDED
Status: CANCELLED | OUTPATIENT
Start: 2019-05-13

## 2019-05-01 RX ORDER — SODIUM CHLORIDE 0.9 % (FLUSH) 0.9 %
10 SYRINGE (ML) INJECTION AS NEEDED
Status: DISCONTINUED | OUTPATIENT
Start: 2019-05-01 | End: 2019-05-02 | Stop reason: HOSPADM

## 2019-05-01 RX ADMIN — Medication 10 ML: at 08:18

## 2019-05-01 RX ADMIN — HEPARIN 5 ML: 100 SYRINGE at 08:18

## 2019-05-06 DIAGNOSIS — I10 ESSENTIAL HYPERTENSION: ICD-10-CM

## 2019-05-06 DIAGNOSIS — E78.5 HYPERLIPIDEMIA, UNSPECIFIED HYPERLIPIDEMIA TYPE: ICD-10-CM

## 2019-05-07 RX ORDER — METFORMIN HYDROCHLORIDE 500 MG/1
TABLET ORAL
Qty: 180 TABLET | Refills: 0 | Status: SHIPPED | OUTPATIENT
Start: 2019-05-07 | End: 2019-08-05 | Stop reason: SDUPTHER

## 2019-05-07 RX ORDER — ATORVASTATIN CALCIUM 20 MG/1
TABLET, FILM COATED ORAL
Qty: 90 TABLET | Refills: 0 | Status: SHIPPED | OUTPATIENT
Start: 2019-05-07 | End: 2019-08-05 | Stop reason: SDUPTHER

## 2019-05-13 ENCOUNTER — INFUSION (OUTPATIENT)
Dept: ONCOLOGY | Facility: CLINIC | Age: 74
End: 2019-05-13
Payer: MEDICARE

## 2019-05-13 ENCOUNTER — OFFICE VISIT (OUTPATIENT)
Dept: ONCOLOGY | Facility: CLINIC | Age: 74
End: 2019-05-13
Payer: MEDICARE

## 2019-05-13 ENCOUNTER — APPOINTMENT (OUTPATIENT)
Dept: ONCOLOGY | Facility: CLINIC | Age: 74
End: 2019-05-13
Payer: MEDICARE

## 2019-05-13 VITALS
TEMPERATURE: 97.2 F | HEIGHT: 62 IN | DIASTOLIC BLOOD PRESSURE: 72 MMHG | SYSTOLIC BLOOD PRESSURE: 150 MMHG | WEIGHT: 112.6 LBS | BODY MASS INDEX: 20.72 KG/M2 | OXYGEN SATURATION: 95 % | HEART RATE: 84 BPM

## 2019-05-13 DIAGNOSIS — C18.2 MALIGNANT NEOPLASM OF ASCENDING COLON (CMS/HCC): ICD-10-CM

## 2019-05-13 DIAGNOSIS — C18.2 MALIGNANT NEOPLASM OF ASCENDING COLON (CMS/HCC): Primary | ICD-10-CM

## 2019-05-13 LAB
ALBUMIN SERPL-MCNC: 3.8 G/DL (ref 3.5–5.3)
ALP SERPL-CCNC: 85 U/L (ref 55–142)
ALT SERPL-CCNC: 20 U/L (ref 0–52)
ANION GAP SERPL CALC-SCNC: 8 MMOL/L (ref 3–11)
ANISOCYTOSIS PRESENCE IN BLOOD, ANALYZER: ABNORMAL
AST SERPL-CCNC: 31 U/L (ref 0–39)
BASOPHILS # BLD AUTO: 0 10*3/UL
BASOPHILS NFR BLD AUTO: 1 % (ref 0–2)
BILIRUB SERPL-MCNC: 0.58 MG/DL (ref 0–1.4)
BUN SERPL-MCNC: 11 MG/DL (ref 7–25)
CALCIUM ALBUM COR SERPL-MCNC: 9.3 MG/DL (ref 8.6–10.3)
CALCIUM SERPL-MCNC: 9.1 MG/DL (ref 8.6–10.3)
CHLORIDE SERPL-SCNC: 107 MMOL/L (ref 98–107)
CO2 SERPL-SCNC: 25 MMOL/L (ref 21–32)
CREAT SERPL-MCNC: 0.6 MG/DL (ref 0.6–1.2)
EOSINOPHIL # BLD AUTO: 0 10*3/UL
EOSINOPHIL NFR BLD AUTO: 1 % (ref 0–3)
ERYTHROCYTE [DISTWIDTH] IN BLOOD BY AUTOMATED COUNT: 17.8 % (ref 11.5–14)
FERRITIN SERPL-MCNC: 52.1 NG/ML (ref 11–307)
GFR SERPL CREATININE-BSD FRML MDRD: 92 ML/MIN/1.73M*2
GLUCOSE SERPL-MCNC: 172 MG/DL (ref 70–105)
HCT VFR BLD AUTO: 40 % (ref 34–45)
HGB BLD-MCNC: 13.4 G/DL (ref 11.5–15.5)
LYMPHOCYTES # BLD AUTO: 0.8 10*3/UL
LYMPHOCYTES NFR BLD AUTO: 25 % (ref 11–47)
MACROCYTOSIS PRESENCE IN BLOOD, ANALYZER: ABNORMAL
MCH RBC QN AUTO: 33.8 PG (ref 28–33)
MCHC RBC AUTO-ENTMCNC: 33.6 G/DL (ref 32–36)
MCV RBC AUTO: 100.6 FL (ref 81–97)
MONOCYTES # BLD AUTO: 0.5 10*3/UL
MONOCYTES NFR BLD AUTO: 16 % (ref 3–11)
NEUTROPHILS # BLD AUTO: 1.7 10*3/UL
NEUTROPHILS NFR BLD AUTO: 58 % (ref 41–81)
PLATELET # BLD AUTO: 123 10*3/UL (ref 140–350)
PMV BLD AUTO: 7 FL (ref 6.9–10.8)
POTASSIUM SERPL-SCNC: 3.8 MMOL/L (ref 3.5–5.1)
PROT SERPL-MCNC: 5.9 G/DL (ref 6–8.3)
RBC # BLD AUTO: 3.97 10*6/ΜL (ref 3.7–5.3)
SODIUM SERPL-SCNC: 140 MMOL/L (ref 135–145)
WBC # BLD AUTO: 3 10*3/UL (ref 4.5–10.5)

## 2019-05-13 PROCEDURE — 99214 OFFICE O/P EST MOD 30 MIN: CPT | Performed by: INTERNAL MEDICINE

## 2019-05-13 PROCEDURE — G0463 HOSPITAL OUTPT CLINIC VISIT: HCPCS

## 2019-05-13 PROCEDURE — 85025 COMPLETE CBC W/AUTO DIFF WBC: CPT

## 2019-05-13 PROCEDURE — 96374 THER/PROPH/DIAG INJ IV PUSH: CPT | Mod: 59

## 2019-05-13 PROCEDURE — 2500000200 HC RX 250 WO HCPCS: Performed by: INTERNAL MEDICINE

## 2019-05-13 PROCEDURE — 82728 ASSAY OF FERRITIN: CPT

## 2019-05-13 PROCEDURE — 36415 COLL VENOUS BLD VENIPUNCTURE: CPT

## 2019-05-13 PROCEDURE — 96375 TX/PRO/DX INJ NEW DRUG ADDON: CPT

## 2019-05-13 PROCEDURE — 96416 CHEMO PROLONG INFUSE W/PUMP: CPT

## 2019-05-13 PROCEDURE — 2580000300 HC RX 258: Performed by: INTERNAL MEDICINE

## 2019-05-13 PROCEDURE — 80053 COMPREHEN METABOLIC PANEL: CPT

## 2019-05-13 PROCEDURE — 6360000200 HC RX 636 W HCPCS (ALT 250 FOR IP): Performed by: INTERNAL MEDICINE

## 2019-05-13 RX ORDER — PALONOSETRON 0.05 MG/ML
0.25 INJECTION, SOLUTION INTRAVENOUS ONCE
Status: CANCELLED | OUTPATIENT
Start: 2019-05-13

## 2019-05-13 RX ORDER — SODIUM CHLORIDE 9 MG/ML
100 INJECTION, SOLUTION INTRAVENOUS CONTINUOUS
Status: CANCELLED
Start: 2019-05-13

## 2019-05-13 RX ORDER — SODIUM CHLORIDE 9 MG/ML
100 INJECTION, SOLUTION INTRAVENOUS CONTINUOUS
Status: DISCONTINUED | OUTPATIENT
Start: 2019-05-13 | End: 2019-05-13 | Stop reason: HOSPADM

## 2019-05-13 RX ORDER — DIPHENHYDRAMINE HYDROCHLORIDE 50 MG/ML
50 INJECTION INTRAMUSCULAR; INTRAVENOUS ONCE AS NEEDED
Status: CANCELLED | OUTPATIENT
Start: 2019-05-13

## 2019-05-13 RX ORDER — LORAZEPAM 2 MG/ML
1 INJECTION INTRAMUSCULAR ONCE AS NEEDED
Status: CANCELLED | OUTPATIENT
Start: 2019-05-13

## 2019-05-13 RX ORDER — PALONOSETRON 0.05 MG/ML
0.25 INJECTION, SOLUTION INTRAVENOUS ONCE
Status: COMPLETED | OUTPATIENT
Start: 2019-05-13 | End: 2019-05-13

## 2019-05-13 RX ADMIN — DEXAMETHASONE SODIUM PHOSPHATE 12 MG: 10 INJECTION, SOLUTION INTRAMUSCULAR; INTRAVENOUS at 09:22

## 2019-05-13 RX ADMIN — PALONOSETRON HYDROCHLORIDE 0.25 MG: 0.25 INJECTION, SOLUTION INTRAVENOUS at 09:17

## 2019-05-13 RX ADMIN — FLUOROURACIL 2800 MG: 50 INJECTION, SOLUTION INTRAVENOUS at 09:43

## 2019-05-13 RX ADMIN — SODIUM CHLORIDE 100 ML/HR: 9 INJECTION, SOLUTION INTRAVENOUS at 09:15

## 2019-05-13 ASSESSMENT — PAIN SCALES - GENERAL: PAINLEVEL: 0-NO PAIN

## 2019-05-13 NOTE — PROGRESS NOTES
Medical Oncology Follow-Up    Date of Service: 5/13/2019    Subjective   HISTORY OF PRESENT ILLNESS:  This is a 73-year-old female with colon cancer.  The patient returns oncology clinic for follow-up and adjuvant chemotherapy.     The patient was diagnosed with colon cancer in October 2018 when she presented with 2-year history of anemia and more recent history of right lower quadrant abdominal pain.  She initially underwent a colonoscopy which revealed the presence of colon cancer in ascending colon. She underwent resection on October 25, 2018.  Surgical margins were negative, the tumor extended through pericolonic tissues.  There were 19 lymph nodes removed in 1 was metastatic.  Pathologically was a [id number] colon cancer.  After she recovered from surgery, I saw the patient for consultation in November 2018. Because of her high risk for recurrence I recommended adjuvant chemotherapy with FOLFOX over a period of 6 months.  After the first cycle she had neutropenia which required Neupogen and discontinuation of 5-FU injection.  With the second cycle she had a reaction to oxaliplatin requiring steroid premedication the night before and morning of chemotherapy.    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  With this cycle 9, Dr. Morris decided to stop oxaliplatin altogether because of worsening neurotoxicity and other side effects.  The patient did better with 5-FU alone.  Today she returns oncology clinic for the cycle #12 which will be her last cycle.    Patient states that she has done well after the last cycle without any significant side effects.  She has some mild fatigue but no other symptoms.  Denies abdominal complaints such as abdominal pain, diarrhea, constipation, hematemesis, melena, hematochezia or jaundice.  She has not lost any weight recently.  Neuropathy in her hands in the form of numbness involving fingertips has been stable.            PAST MEDICAL  HISTORY:   Past Medical History:   Diagnosis Date   • Blood disorder     anemic   • Complication of anesthesia    • Diabetes mellitus (CMS/HCC) (HCC)    • Hypertension    • Malignant neoplasm of ascending colon (CMS/HCC) (HCC) 10/19/2018   • PONV (postoperative nausea and vomiting)    • Shortness of breath     low iron        FAMILY HISTORY:   Family History   Problem Relation Age of Onset   • Hypertension Mother    • COPD Mother    • Hypertension Father    • Brain Aneurysm Sister    • Cancer Maternal Grandmother 36        SOCIAL HISTORY:   Social History     Socioeconomic History   • Marital status:      Spouse name: Not on file   • Number of children: Not on file   • Years of education: Not on file   • Highest education level: Not on file   Social Needs   • Financial resource strain: Not on file   • Food insecurity - worry: Not on file   • Food insecurity - inability: Not on file   • Transportation needs - medical: Not on file   • Transportation needs - non-medical: Not on file   Occupational History   • Not on file   Tobacco Use   • Smoking status: Never Smoker   • Smokeless tobacco: Never Used   Substance and Sexual Activity   • Alcohol use: Yes     Alcohol/week: 4.2 oz     Types: 7 Glasses of wine per week     Comment: WEEKLY   • Drug use: No   • Sexual activity: Not on file   Other Topics Concern   • Not on file   Social History Narrative   • Not on file        ALLERGIES:   Allergies as of 05/13/2019   • (No Known Allergies)        MEDICATIONS:   Current Outpatient Medications   Medication Sig Dispense Refill   • metFORMIN (GLUCOPHAGE) 500 mg tablet TAKE 1 TABLET BY MOUTH TWICE DAILY WITH MEALS 180 tablet 0   • atorvastatin (LIPITOR) 20 mg tablet TAKE 1 TABLET BY MOUTH EVERY DAY 90 tablet 0   • prochlorperazine (COMPAZINE) 10 mg tablet      • losartan (COZAAR) 50 mg tablet TAKE 1 TABLET BY MOUTH EVERY DAY (Patient taking differently: TAKE 1/2 TABLET BY MOUTH EVERY DAY) 90 tablet 0   •  "lidocaine-prilocaine (EMLA) cream Apply 1 application topically as needed for pain scale 1-3/10 Apply over port site 1 hour before scheduled access 30 g 2   • lidocaine-prilocaine (EMLA) cream Apply thick layer to intact skin over port one hour prior to procedure. Cover with occlusive dressing. 30 g 11   • sodium chloride injection Infuse 10 mL into a venous catheter as needed (to flush port) Syringe #1 20 mL 11   • heparin, porcine, PF, 100 unit/mL syringe Infuse 5 mL into a venous catheter as needed (to flush port) Syringe #2 10 mL 11   • acetaminophen (TYLENOL) 500 mg tablet Take 2 tablets (1,000 mg total) by mouth every 8 (eight) hours as needed for pain scale 1-3/10.  0   • cholecalciferol, vitamin D3, (cholecalciferol) 1,000 unit tablet Take 2,000 Units by mouth daily.     • ferrous sulfate (SLOW FE) 142 mg (45 mg iron) tablet extended release Take 1 tablet by mouth daily.     • lancing device with lancets (ACCU-CHEK FASTCLIX) kit Use as directed to check blood sugars twice daily. 100 each 3   • lancets (ACCU-CHEK FASTCLIX) [facility] [place] USE BID TO TEST BLOOD SUGAR LEVELS 204 1   • blood-glucose meter (ACCU-CHEK JOSE MANUEL) misc [employer] 1 0   • sennosides-docusate sodium (SENNA WITH DOCUSATE SODIUM) 8.6-50 mg Take 1 tablet by mouth 2 (two) times a day as needed for constipation. Please take 1 tab BID while on narcotic pain medication to prevent constipation (Patient not taking: Reported on 4/29/2019 ) 30 tablet 0     No current facility-administered medications for this visit.        REVIEW OF SYSTEMS:   A 14 point systems review was done.  It was negative with the exception of stable neuropathy involving fingers and mild fatigue    The ECOG performance status today is 0          Objective    PHYSICAL EXAM:   /72   Pulse 84   Temp 36.2 °C (97.2 °F) (Temporal)   Ht 1.575 m (5' 2\")   Wt 51.1 kg (112 lb 9.6 oz)   SpO2 95%   BMI 20.59 kg/m²    Body mass index is 20.59 kg/m².   Patient  looked  fairly well.  No distress. "      HEENT: No scleral icterus.  No oral or pharyngeal lesions     Ln: No lymphadenopathy neck axilla or groin     Lungs: Clear.  No rales, rhonchi or wheezing     CVS: Regular rate and rhythm.  No S3.  No friction rub     Abdomen: Benign nontender.  No hepatosplenomegaly.  No palpable abnormalities     Ext: No edema.  Pulses intact     Musculoskeletal: No muscle or bone tenderness including spine pelvis     Skin: No petechia purpura rash     Neuro: Alert oriented ×3.  No focal deficits.  No significant sensory defect involving fingers            Physical Exam    DIAGNOSTIC REPORTS REVIEWED:   CBC today shows a white cell count of 3.0, hemoglobin of 13.4 and platelet count of 123,000.  Comprehensive metabolic panel was normal with the exception of elevated glucose at 172.           Assessment/Plan    IMPRESSION and PLAN:   This is a 73-year-old female with colon cancer.  The patient returns oncology clinic for follow-up and treatment.    The patient appears to have done well after oxaliplatin was discontinued.  She does not have any new/acute issues.  Her examination is unremarkable.  Her labs are within normal limits with the exception of mild leukopenia which is due to chemotherapy.  As a result, we will proceed with the last cycle of her adjuvant chemotherapy with 5-FU infusion supported by Neulasta.  My intention is to repeat her imaging studies in 2 weeks to make sure that she is still in remission.  If the CT scan does not show any abnormalities we will start her surveillance and she will return to oncology clinic in 3 months for follow-up.    I had a fairly long conversation with the patient.  I reviewed her labs.  Once again I reviewed the treatment and follow-up plan and answered her questions.  The patient verbalized her understanding and approval of ongoing therapy and my recommendations.               Physician: M. BEHNAN SAHIN, MD

## 2019-05-15 ENCOUNTER — HOSPITAL ENCOUNTER (OUTPATIENT)
Dept: INFUSION THERAPY | Facility: HOSPITAL | Age: 74
Setting detail: INFUSION SERIES
Discharge: STILL ADMITTED OR ADMINISTRATIVE | End: 2019-05-15
Payer: MEDICARE

## 2019-05-15 VITALS
DIASTOLIC BLOOD PRESSURE: 52 MMHG | HEART RATE: 84 BPM | TEMPERATURE: 97.9 F | SYSTOLIC BLOOD PRESSURE: 155 MMHG | RESPIRATION RATE: 16 BRPM

## 2019-05-15 DIAGNOSIS — K63.89 MASS OF CECUM: ICD-10-CM

## 2019-05-15 DIAGNOSIS — C18.2 MALIGNANT NEOPLASM OF ASCENDING COLON (CMS/HCC): Primary | ICD-10-CM

## 2019-05-15 PROCEDURE — 96523 IRRIG DRUG DELIVERY DEVICE: CPT

## 2019-05-15 PROCEDURE — 6360000200 HC RX 636 W HCPCS (ALT 250 FOR IP): Performed by: NURSE PRACTITIONER

## 2019-05-15 PROCEDURE — G0463 HOSPITAL OUTPT CLINIC VISIT: HCPCS

## 2019-05-15 RX ORDER — HEPARIN 100 UNIT/ML
500 SYRINGE INTRAVENOUS AS NEEDED
Status: DISCONTINUED | OUTPATIENT
Start: 2019-05-15 | End: 2019-05-16 | Stop reason: HOSPADM

## 2019-05-15 RX ORDER — SODIUM CHLORIDE 0.9 % (FLUSH) 0.9 %
10 SYRINGE (ML) INJECTION AS NEEDED
Status: CANCELLED | OUTPATIENT
Start: 2019-05-15

## 2019-05-15 RX ORDER — HEPARIN 100 UNIT/ML
500 SYRINGE INTRAVENOUS AS NEEDED
Status: CANCELLED | OUTPATIENT
Start: 2019-05-15

## 2019-05-15 RX ORDER — SODIUM CHLORIDE 0.9 % (FLUSH) 0.9 %
10 SYRINGE (ML) INJECTION AS NEEDED
Status: DISCONTINUED | OUTPATIENT
Start: 2019-05-15 | End: 2019-05-16 | Stop reason: HOSPADM

## 2019-05-15 RX ADMIN — HEPARIN 5 ML: 100 SYRINGE at 08:16

## 2019-05-15 ASSESSMENT — PAIN SCALES - GENERAL: PAINLEVEL: 0-NO PAIN

## 2019-05-16 ENCOUNTER — HOSPITAL ENCOUNTER (OUTPATIENT)
Dept: INFUSION THERAPY | Facility: HOSPITAL | Age: 74
Setting detail: INFUSION SERIES
Discharge: STILL ADMITTED OR ADMINISTRATIVE | End: 2019-05-16
Payer: MEDICARE

## 2019-05-16 VITALS
SYSTOLIC BLOOD PRESSURE: 141 MMHG | RESPIRATION RATE: 16 BRPM | TEMPERATURE: 98.3 F | HEART RATE: 80 BPM | DIASTOLIC BLOOD PRESSURE: 57 MMHG

## 2019-05-16 DIAGNOSIS — C18.2 MALIGNANT NEOPLASM OF ASCENDING COLON (CMS/HCC): Primary | ICD-10-CM

## 2019-05-16 PROCEDURE — 96372 THER/PROPH/DIAG INJ SC/IM: CPT

## 2019-05-16 PROCEDURE — 6360000200 HC RX 636 W HCPCS (ALT 250 FOR IP): Mod: TB | Performed by: INTERNAL MEDICINE

## 2019-05-16 RX ADMIN — PEGFILGRASTIM 6 MG: 6 INJECTION SUBCUTANEOUS at 09:38

## 2019-05-16 ASSESSMENT — PAIN SCALES - GENERAL: PAINLEVEL: 0-NO PAIN

## 2019-05-28 ENCOUNTER — HOSPITAL ENCOUNTER (OUTPATIENT)
Dept: CT IMAGING | Facility: HOSPITAL | Age: 74
Discharge: HOME | End: 2019-05-28
Payer: MEDICARE

## 2019-05-28 DIAGNOSIS — K63.89 MASS OF CECUM: ICD-10-CM

## 2019-05-28 DIAGNOSIS — C18.2 MALIGNANT NEOPLASM OF ASCENDING COLON (CMS/HCC): Primary | ICD-10-CM

## 2019-05-28 PROCEDURE — 6360000200 HC RX 636 W HCPCS (ALT 250 FOR IP): Performed by: NURSE PRACTITIONER

## 2019-05-28 PROCEDURE — 74177 CT ABD & PELVIS W/CONTRAST: CPT

## 2019-05-28 PROCEDURE — 71260 CT THORAX DX C+: CPT | Mod: 26,51 | Performed by: RADIOLOGY

## 2019-05-28 PROCEDURE — 74177 CT ABD & PELVIS W/CONTRAST: CPT | Mod: 26 | Performed by: RADIOLOGY

## 2019-05-28 PROCEDURE — 2550000100 HC RX 255: Performed by: INTERNAL MEDICINE

## 2019-05-28 RX ORDER — HEPARIN 100 UNIT/ML
500 SYRINGE INTRAVENOUS AS NEEDED
Status: CANCELLED | OUTPATIENT
Start: 2019-05-28

## 2019-05-28 RX ORDER — HEPARIN 100 UNIT/ML
500 SYRINGE INTRAVENOUS AS NEEDED
Status: DISCONTINUED | OUTPATIENT
Start: 2019-05-28 | End: 2019-05-29 | Stop reason: HOSPADM

## 2019-05-28 RX ORDER — IOPAMIDOL 755 MG/ML
100 INJECTION, SOLUTION INTRAVASCULAR ONCE
Status: COMPLETED | OUTPATIENT
Start: 2019-05-28 | End: 2019-05-28

## 2019-05-28 RX ORDER — SODIUM CHLORIDE 0.9 % (FLUSH) 0.9 %
10 SYRINGE (ML) INJECTION AS NEEDED
Status: DISCONTINUED | OUTPATIENT
Start: 2019-05-28 | End: 2019-05-29 | Stop reason: HOSPADM

## 2019-05-28 RX ORDER — SODIUM CHLORIDE 0.9 % (FLUSH) 0.9 %
10 SYRINGE (ML) INJECTION AS NEEDED
Status: CANCELLED | OUTPATIENT
Start: 2019-05-28

## 2019-05-28 RX ADMIN — IOPAMIDOL 100 ML: 755 INJECTION, SOLUTION INTRAVENOUS at 12:30

## 2019-05-28 RX ADMIN — ALTEPLASE 2 MG: 2.2 INJECTION, POWDER, LYOPHILIZED, FOR SOLUTION INTRAVENOUS at 11:05

## 2019-05-28 RX ADMIN — HEPARIN 5 ML: 100 SYRINGE at 11:59

## 2019-05-28 RX ADMIN — Medication 10 ML: at 11:59

## 2019-05-29 DIAGNOSIS — I10 ESSENTIAL HYPERTENSION: ICD-10-CM

## 2019-05-29 RX ORDER — LOSARTAN POTASSIUM 50 MG/1
TABLET ORAL
Qty: 90 TABLET | Refills: 0 | OUTPATIENT
Start: 2019-05-29

## 2019-05-30 DIAGNOSIS — C18.2 MALIGNANT NEOPLASM OF ASCENDING COLON (CMS/HCC): Primary | ICD-10-CM

## 2019-05-31 ENCOUNTER — TELEPHONE (OUTPATIENT)
Dept: ONCOLOGY | Facility: CLINIC | Age: 74
End: 2019-05-31

## 2019-05-31 DIAGNOSIS — I10 ESSENTIAL HYPERTENSION: ICD-10-CM

## 2019-05-31 RX ORDER — LOSARTAN POTASSIUM 50 MG/1
TABLET ORAL
Qty: 90 TABLET | Refills: 1 | Status: SHIPPED | OUTPATIENT
Start: 2019-05-31 | End: 2019-11-18 | Stop reason: SDUPTHER

## 2019-05-31 NOTE — TELEPHONE ENCOUNTER
Let patient know her CT results, there was a nonspecific finding near the surgical site; disease recurrence/residual disease vs seroma vs surgical changes. CT scan recommended in 3 months. Dr. Fierro placed this order already. Asked that she call back with questions.    ----- Message from Raisa Hall RN sent at 5/31/2019  2:15 PM MDT -----  Contact: [phone]  Can you go over CT results or recommendations?    ----- Message -----  From: Luciana Vital  Sent: 5/31/2019   8:14 AM  To: , #    8:15 Pt believes MBS called her yesterday at about 5:00 to go over her CT results - please call.

## 2019-06-17 ENCOUNTER — TELEPHONE (OUTPATIENT)
Dept: ONCOLOGY | Facility: CLINIC | Age: 74
End: 2019-06-17

## 2019-06-17 DIAGNOSIS — G62.2 POLYNEUROPATHY DUE TO OTHER TOXIC AGENTS (CMS/HCC): Primary | ICD-10-CM

## 2019-06-17 RX ORDER — GABAPENTIN 100 MG/1
CAPSULE ORAL
Qty: 90 CAPSULE | Refills: 3 | Status: SHIPPED | OUTPATIENT
Start: 2019-06-17 | End: 2020-05-19

## 2019-06-17 NOTE — PROGRESS NOTES
----- Message from Geovanna Ulloa RN sent at 6/17/2019 10:59 AM MDT -----  Regarding: RE: numbness and pain  Contact: [phone]  She does want to try the Gabapentin.  Please, send to [facility].  Thank you!    ----- Message -----  From: Suri Bee CNP  Sent: 6/17/2019  10:14 AM  To: Geovanna Ulloa RN  Subject: RE: numbness and pain                            We can try low dose gabapentin if she would like. It may help with the pain/tingling but won't do anything for numbness. I would start her on 100 mg nightly, then increase to 100 mg BID, then TID as tolerates. Side effects can include some drowsiness. If she wants to look into the medication first that would be fine. I can see her in follow up sometime this week (if I have an opening) to talk about it in more detail or I can order it for her if she is interested.    ----- Message -----  From: Geovanna Ulloa RN  Sent: 6/17/2019  10:00 AM  To: Suri Bee CNP  Subject: numbness and pain                                Patient is experiencing numbness of fingers and toes since being off chemo.  Fingers and toes are, also, sore and the numbness and tingling go up her arm when she lays down at night and keeps her from sleeping.  Any suggestions?    ----- Message -----  From: Raisa Hall RN  Sent: 6/17/2019   9:50 AM  To: , #        ----- Message -----  From: Luciana Vital  Sent: 6/17/2019   9:40 AM  To: , #    9:41 Pt is having some side effects from chemo & would like to visit with Suri ROJAS about it - please call.

## 2019-06-17 NOTE — PROGRESS NOTES
Phone call from patient stating she has numbness in fingers and toes and pain in fingers and radiating up her arm at night, keeping her from sleeping. This has continued since her last chem end of May.  Discussed with KM.  Pt would like to start the Gabapentin, KM suggested.  KM will order this.  I did discuss the side effect of drowsiness and let her know she would start at 1 100 mg tab at night and increase to BID and TID as tolerated.

## 2019-06-24 ENCOUNTER — TELEPHONE (OUTPATIENT)
Dept: ONCOLOGY | Facility: CLINIC | Age: 74
End: 2019-06-24

## 2019-06-24 NOTE — TELEPHONE ENCOUNTER
Patient called stating that her left leg has had excruciating pain that started Wednesday. It radiates from her back to her hip and down the lateral side. She denies any swelling, warmth, redness and no loss of bladder or bowel. She states the only thing different is she did was weed eat the yard. Discussed with Suri Hua. Per Suri Cui, patient can reduce dose of gabapentin for a few days and see if this helps and/or discuss sciatic with PCP. Patient made aware of this and verbalizes understanding.    ----- Message from Patt Soares sent at 6/24/2019  8:49 AM MDT -----  Regarding: FW: Zaria  Contact: [phone]  Sorry sent to the [facility] person to much going on up here lol  ----- Message -----  From: Patt Soares  Sent: 6/24/2019   8:18 AM  To: , #  Subject: Zaria                                      Pt having a bad reaction to her medication and is upset.

## 2019-08-05 DIAGNOSIS — E78.5 HYPERLIPIDEMIA, UNSPECIFIED HYPERLIPIDEMIA TYPE: ICD-10-CM

## 2019-08-05 DIAGNOSIS — I10 ESSENTIAL HYPERTENSION: ICD-10-CM

## 2019-08-05 RX ORDER — METFORMIN HYDROCHLORIDE 500 MG/1
TABLET ORAL
Qty: 180 TABLET | Refills: 0 | Status: SHIPPED | OUTPATIENT
Start: 2019-08-05 | End: 2019-11-05 | Stop reason: SDUPTHER

## 2019-08-05 RX ORDER — ATORVASTATIN CALCIUM 20 MG/1
TABLET, FILM COATED ORAL
Qty: 90 TABLET | Refills: 0 | Status: SHIPPED | OUTPATIENT
Start: 2019-08-05 | End: 2019-11-05 | Stop reason: SDUPTHER

## 2019-08-27 ENCOUNTER — OFFICE VISIT (OUTPATIENT)
Dept: ONCOLOGY | Facility: CLINIC | Age: 74
End: 2019-08-27
Payer: MEDICARE

## 2019-08-27 ENCOUNTER — HOSPITAL ENCOUNTER (OUTPATIENT)
Dept: CT IMAGING | Facility: HOSPITAL | Age: 74
Discharge: HOME | End: 2019-08-27
Payer: MEDICARE

## 2019-08-27 ENCOUNTER — APPOINTMENT (OUTPATIENT)
Dept: ONCOLOGY | Facility: CLINIC | Age: 74
End: 2019-08-27
Payer: MEDICARE

## 2019-08-27 VITALS
BODY MASS INDEX: 20.67 KG/M2 | DIASTOLIC BLOOD PRESSURE: 74 MMHG | WEIGHT: 113 LBS | TEMPERATURE: 98.3 F | OXYGEN SATURATION: 98 % | SYSTOLIC BLOOD PRESSURE: 123 MMHG | HEART RATE: 91 BPM

## 2019-08-27 DIAGNOSIS — C18.2 MALIGNANT NEOPLASM OF ASCENDING COLON (CMS/HCC): ICD-10-CM

## 2019-08-27 LAB
ALBUMIN SERPL-MCNC: 4.3 G/DL (ref 3.5–5.3)
ALP SERPL-CCNC: 74 U/L (ref 55–142)
ALT SERPL-CCNC: 20 U/L (ref 0–52)
ANION GAP SERPL CALC-SCNC: 10 MMOL/L (ref 3–11)
AST SERPL-CCNC: 29 U/L (ref 0–39)
BASOPHILS # BLD AUTO: 0 10*3/UL
BASOPHILS NFR BLD AUTO: 0 % (ref 0–2)
BILIRUB SERPL-MCNC: 0.88 MG/DL (ref 0–1.4)
BUN SERPL-MCNC: 12 MG/DL (ref 7–25)
CALCIUM ALBUM COR SERPL-MCNC: 9.4 MG/DL (ref 8.6–10.3)
CALCIUM SERPL-MCNC: 9.6 MG/DL (ref 8.6–10.3)
CEA SERPL-MCNC: 2.8 NG/ML (ref 0–9.9)
CHLORIDE SERPL-SCNC: 105 MMOL/L (ref 98–107)
CO2 SERPL-SCNC: 24 MMOL/L (ref 21–32)
CREAT SERPL-MCNC: 0.6 MG/DL (ref 0.6–1.2)
EOSINOPHIL # BLD AUTO: 0.1 10*3/UL
EOSINOPHIL NFR BLD AUTO: 1 % (ref 0–3)
ERYTHROCYTE [DISTWIDTH] IN BLOOD BY AUTOMATED COUNT: 13.8 % (ref 11.5–14)
GFR SERPL CREATININE-BSD FRML MDRD: 91 ML/MIN/1.73M*2
GLUCOSE SERPL-MCNC: 107 MG/DL (ref 70–105)
HCT VFR BLD AUTO: 45.9 % (ref 34–45)
HGB BLD-MCNC: 15.2 G/DL (ref 11.5–15.5)
LYMPHOCYTES # BLD AUTO: 1.4 10*3/UL
LYMPHOCYTES NFR BLD AUTO: 32 % (ref 11–47)
MCH RBC QN AUTO: 30.3 PG (ref 28–33)
MCHC RBC AUTO-ENTMCNC: 33.1 G/DL (ref 32–36)
MCV RBC AUTO: 91.8 FL (ref 81–97)
MONOCYTES # BLD AUTO: 0.4 10*3/UL
MONOCYTES NFR BLD AUTO: 10 % (ref 3–11)
NEUTROPHILS # BLD AUTO: 2.5 10*3/UL
NEUTROPHILS NFR BLD AUTO: 57 % (ref 41–81)
PLATELET # BLD AUTO: 182 10*3/UL (ref 140–350)
PMV BLD AUTO: 7 FL (ref 6.9–10.8)
POTASSIUM SERPL-SCNC: 4 MMOL/L (ref 3.5–5.1)
PROT SERPL-MCNC: 6.6 G/DL (ref 6–8.3)
RBC # BLD AUTO: 5 10*6/ΜL (ref 3.7–5.3)
SODIUM SERPL-SCNC: 139 MMOL/L (ref 135–145)
WBC # BLD AUTO: 4.3 10*3/UL (ref 4.5–10.5)

## 2019-08-27 PROCEDURE — 82378 CARCINOEMBRYONIC ANTIGEN: CPT

## 2019-08-27 PROCEDURE — 36415 COLL VENOUS BLD VENIPUNCTURE: CPT

## 2019-08-27 PROCEDURE — G0463 HOSPITAL OUTPT CLINIC VISIT: HCPCS

## 2019-08-27 PROCEDURE — 80053 COMPREHEN METABOLIC PANEL: CPT

## 2019-08-27 PROCEDURE — 85025 COMPLETE CBC W/AUTO DIFF WBC: CPT

## 2019-08-27 PROCEDURE — 6360000200 HC RX 636 W HCPCS (ALT 250 FOR IP): Performed by: INTERNAL MEDICINE

## 2019-08-27 PROCEDURE — 99215 OFFICE O/P EST HI 40 MIN: CPT | Performed by: INTERNAL MEDICINE

## 2019-08-27 PROCEDURE — 74177 CT ABD & PELVIS W/CONTRAST: CPT

## 2019-08-27 PROCEDURE — 2550000100 HC RX 255: Mod: JW | Performed by: INTERNAL MEDICINE

## 2019-08-27 RX ORDER — HEPARIN 100 UNIT/ML
5 SYRINGE INTRAVENOUS ONCE
Status: COMPLETED | OUTPATIENT
Start: 2019-08-27 | End: 2019-08-27

## 2019-08-27 RX ORDER — IOPAMIDOL 755 MG/ML
70 INJECTION, SOLUTION INTRAVASCULAR ONCE
Status: COMPLETED | OUTPATIENT
Start: 2019-08-27 | End: 2019-08-27

## 2019-08-27 RX ADMIN — IOPAMIDOL 70 ML: 755 INJECTION, SOLUTION INTRAVENOUS at 11:09

## 2019-08-27 RX ADMIN — Medication 5 ML: at 11:15

## 2019-08-27 ASSESSMENT — PAIN SCALES - GENERAL: PAINLEVEL: 0-NO PAIN

## 2019-08-27 NOTE — PROGRESS NOTES
Medical Oncology Follow-Up    Date of Service: 8/27/2019    Subjective   HISTORY OF PRESENT ILLNESS:  This is a 73-year-old female with colon cancer.  The patient returns oncology clinic for follow-up and adjuvant chemotherapy.     The patient was diagnosed with colon cancer in October 2018 when she presented with 2-year history of anemia and more recent history of right lower quadrant abdominal pain.  She initially underwent a colonoscopy which revealed the presence of colon cancer in ascending colon. She underwent resection on October 25, 2018.  Surgical margins were negative, the tumor extended through pericolonic tissues.  There were 19 lymph nodes removed in 1 was metastatic.  Pathologically was a [id number] colon cancer.  After she recovered from surgery, I saw the patient for consultation in November 2018. Because of her high risk for recurrence I recommended adjuvant chemotherapy with FOLFOX over a period of 6 months.  After the first cycle she had neutropenia which required Neupogen and discontinuation of 5-FU injection.  With the second cycle she had a reaction to oxaliplatin requiring steroid premedication the night before and morning of chemotherapy.    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  With this cycle 9, Dr. Morris decided to stop oxaliplatin altogether because of worsening neurotoxicity and other side effects.  The patient did better with 5-FU alone.  Finished adjuvant chemotheapry     5/2019 restaging showed no evidence of recurrrence, however had post surgical seroma    8/2019  Restaging showed no recurrence or mets, seroma/ hematoma is smaller .    Today  Fatigue has improved, almost back to baseline   Denies abdominal complaints such as abdominal pain, diarrhea, constipation, hematemesis, melena, hematochezia or jaundice.    She has not lost any weight recently.    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            PAST MEDICAL HISTORY:   Past Medical History:   Diagnosis Date   • Blood disorder     anemic   • Complication of anesthesia    • Diabetes mellitus (CMS/HCC) (HCC)    • Hypertension    • Malignant neoplasm of ascending colon (CMS/HCC) (HCC) 10/19/2018   • PONV (postoperative nausea and vomiting)    • Shortness of breath     low iron        FAMILY HISTORY:   Family History   Problem Relation Age of Onset   • Hypertension Mother    • COPD Mother    • Hypertension Father    • Brain Aneurysm Sister    • Cancer Maternal Grandmother 36        SOCIAL HISTORY:   Social History     Socioeconomic History   • Marital status:      Spouse name: Not on file   • Number of children: Not on file   • Years of education: Not on file   • Highest education level: Not on file   Occupational History   • Not on file   Social Needs   • Financial resource strain: Not on file   • Food insecurity:     Worry: Not on file     Inability: Not on file   • Transportation needs:     Medical: Not on file     Non-medical: Not on file   Tobacco Use   • Smoking status: Never Smoker   • Smokeless tobacco: Never Used   Substance and Sexual Activity   • Alcohol use: Yes     Alcohol/week: 7.0 standard drinks     Types: 7 Glasses of wine per week     Comment: WEEKLY   • Drug use: No   • Sexual activity: Not on file   Lifestyle   • Physical activity:     Days per week: Not on file     Minutes per session: Not on file   • Stress: Not on file   Relationships   • Social connections:     Talks on phone: Not on file     Gets together: Not on file     Attends Hoahaoism service: Not on file     Active member of club or organization: Not on file     Attends meetings of clubs or organizations: Not on file     Relationship status: Not on file   • Intimate partner violence:     Fear of current or ex partner: Not on file     Emotionally abused: Not on file     Physically abused: Not on file     Forced sexual activity: Not on file   Other Topics  Concern   • Not on file   Social History Narrative   • Not on file        ALLERGIES:   Allergies as of 08/27/2019   • (No Known Allergies)        MEDICATIONS:   Current Outpatient Medications   Medication Sig Dispense Refill   • atorvastatin (LIPITOR) 20 mg tablet TAKE 1 TABLET BY MOUTH EVERY DAY 90 tablet 0   • metFORMIN (GLUCOPHAGE) 500 mg tablet TAKE 1 TABLET BY MOUTH TWICE DAILY WITH MEALS 180 tablet 0   • losartan (COZAAR) 50 mg tablet TAKE 1 TABLET BY MOUTH EVERY DAY 90 tablet 1   • lidocaine-prilocaine (EMLA) cream Apply 1 application topically as needed for pain scale 1-3/10 Apply over port site 1 hour before scheduled access 30 g 2   • lidocaine-prilocaine (EMLA) cream Apply thick layer to intact skin over port one hour prior to procedure. Cover with occlusive dressing. 30 g 11   • sodium chloride injection Infuse 10 mL into a venous catheter as needed (to flush port) Syringe #1 20 mL 11   • heparin, porcine, PF, 100 unit/mL syringe Infuse 5 mL into a venous catheter as needed (to flush port) Syringe #2 10 mL 11   • acetaminophen (TYLENOL) 500 mg tablet Take 2 tablets (1,000 mg total) by mouth every 8 (eight) hours as needed for pain scale 1-3/10.  0   • cholecalciferol, vitamin D3, (cholecalciferol) 1,000 unit tablet Take 2,000 Units by mouth daily.     • ferrous sulfate (SLOW FE) 142 mg (45 mg iron) tablet extended release Take 1 tablet by mouth daily.     • lancing device with lancets (ACCU-CHEK FASTCLIX) kit Use as directed to check blood sugars twice daily. 100 each 3   • lancets (ACCU-CHEK FASTCLIX) [facility] [place] USE BID TO TEST BLOOD SUGAR LEVELS 204 1   • blood-glucose meter (ACCU-CHEK JOSE MANUEL) misc FPD 1 0   • gabapentin (NEURONTIN) 100 mg capsule Take 1 capsule at bedtime x 3 days, take 1 capsule BID x 3 days, then take 1 capsule TID (Patient not taking: Reported on 8/27/2019 ) 90 capsule 3   • prochlorperazine (COMPAZINE) 10 mg tablet      • sennosides-docusate sodium (SENNA WITH DOCUSATE SODIUM) 8.6-50 mg  Take 1 tablet by mouth 2 (two) times a day as needed for constipation. Please take 1 tab BID while on narcotic pain medication to prevent constipation (Patient not taking: Reported on 4/29/2019 ) 30 tablet 0     No current facility-administered medications for this visit.        REVIEW OF SYSTEMS:   A 14 point systems review was done.  It was negative with the exception of stable neuropathy involving fingers and mild fatigue    The ECOG performance status today is 0          Objective    PHYSICAL EXAM:   /74   Pulse 91   Temp 36.8 °C (98.3 °F) (Temporal)   Wt 51.3 kg (113 lb)   SpO2 98%   BMI 20.67 kg/m²    Body mass index is 20.67 kg/m².   Patient  looked  fairly well.  No distress.      HEENT: No scleral icterus.  No oral or pharyngeal lesions     Ln: No lymphadenopathy neck axilla or groin     Lungs: Clear.  No rales, rhonchi or wheezing     CVS: Regular rate and rhythm.  No S3.  No friction rub     Abdomen: Benign nontender.  No hepatosplenomegaly.  No palpable abnormalities     Ext: No edema.  Pulses intact     Musculoskeletal: No muscle or bone tenderness including spine pelvis     Skin: No petechia purpura rash     Neuro: Alert oriented ×3.  No focal deficits.  No significant sensory defect involving fingers            Physical Exam    DIAGNOSTIC REPORTS REVIEWED:      Appointment on 08/27/2019   Component Date Value Ref Range Status   • WBC 08/27/2019 4.3* 4.5 - 10.5 10*3/uL Final   • RBC 08/27/2019 5.00  3.70 - 5.30 10*6/µL Final   • Hemoglobin 08/27/2019 15.2  11.5 - 15.5 g/dL Final   • Hematocrit 08/27/2019 45.9* 34.0 - 45.0 % Final   • MCV 08/27/2019 91.8  81.0 - 97.0 fL Final   • MCH 08/27/2019 30.3  28.0 - 33.0 pg Final   • MCHC 08/27/2019 33.1  32.0 - 36.0 g/dL Final   • RDW 08/27/2019 13.8  11.5 - 14.0 % Final   • Platelets 08/27/2019 182  140 - 350 10*3/uL Final   • MPV 08/27/2019 7.0  6.9 - 10.8 fL Final   • Neutrophils% 08/27/2019 57  41 - 81 % Final   • Lymphocytes% 08/27/2019 32  11 -  47 % Final   • Monocytes% 08/27/2019 10  3 - 11 % Final   • Eosinophils% 08/27/2019 1  0 - 3 % Final   • Basophils% 08/27/2019 0  0 - 2 % Final   • Neutrophils Absolute 08/27/2019 2.50  10*3/uL Final   • Lymphocytes Absolute 08/27/2019 1.40  10*3/uL Final   • Monocytes Absolute 08/27/2019 0.40  10*3/uL Final   • Eosinophils Absolute 08/27/2019 0.10  10*3/uL Final   • Basophils Absolute 08/27/2019 0.00  10*3/uL Final   • Sodium 08/27/2019 139  135 - 145 mmol/L Final   • Potassium 08/27/2019 4.0  3.5 - 5.1 mmol/L Final   • Chloride 08/27/2019 105  98 - 107 mmol/L Final   • CO2 08/27/2019 24  21 - 32 mmol/L Final   • Anion Gap 08/27/2019 10  3 - 11 mmol/L Final   • BUN 08/27/2019 12  7 - 25 mg/dL Final   • Creatinine 08/27/2019 0.60  0.60 - 1.20 mg/dL Final   • Glucose 08/27/2019 107* 70 - 105 mg/dL Final   • Calcium 08/27/2019 9.6  8.6 - 10.3 mg/dL Final   • AST 08/27/2019 29  0 - 39 U/L Final   • ALT (SGPT) 08/27/2019 20  0 - 52 U/L Final   • Alkaline Phosphatase 08/27/2019 74  55 - 142 U/L Final   • Total Protein 08/27/2019 6.6  6.0 - 8.3 g/dL Final   • Albumin 08/27/2019 4.3  3.5 - 5.3 g/dL Final   • Total Bilirubin 08/27/2019 0.88  0.00 - 1.40 mg/dL Final   • eGFR 08/27/2019 91  >60 mL/min/1.73m*2 Final   • Corrected Calcium 08/27/2019 9.4  8.6 - 10.3 mg/dL Final   • CEA 08/27/2019 2.8  0.0 - 9.9 ng/mL Final              Assessment/Plan    IMPRESSION and PLAN:   This is a 73-year-old female with colon cancer.   Stage III (T3,N1,M0) s/p resection followed by 6 months of adjuvant FOLFOX (oxaliplatin dropped after cycle 9 due to neuropathy)   CT today 8/27/19 without recurrence   Plan:   - next CT scan in 3 months with Labs and CEA.   - then 6 month follow up with scans and labs and CEA till 5 years   - colonoscopy will need to be done.  - port removal after next CT scan        Physician: KIERA Salas

## 2019-11-05 ENCOUNTER — IMMUNIZATION (OUTPATIENT)
Dept: FAMILY MEDICINE | Facility: CLINIC | Age: 74
End: 2019-11-05
Payer: MEDICARE

## 2019-11-05 DIAGNOSIS — I10 ESSENTIAL HYPERTENSION: ICD-10-CM

## 2019-11-05 DIAGNOSIS — E78.5 HYPERLIPIDEMIA, UNSPECIFIED HYPERLIPIDEMIA TYPE: ICD-10-CM

## 2019-11-05 PROCEDURE — 90686 IIV4 VACC NO PRSV 0.5 ML IM: CPT | Mod: FLU

## 2019-11-05 RX ORDER — METFORMIN HYDROCHLORIDE 500 MG/1
TABLET ORAL
Qty: 180 TABLET | Refills: 0 | Status: SHIPPED | OUTPATIENT
Start: 2019-11-05 | End: 2020-01-29

## 2019-11-05 RX ORDER — ATORVASTATIN CALCIUM 20 MG/1
TABLET, FILM COATED ORAL
Qty: 90 TABLET | Refills: 0 | Status: SHIPPED | OUTPATIENT
Start: 2019-11-05 | End: 2020-01-29

## 2019-11-18 DIAGNOSIS — I10 ESSENTIAL HYPERTENSION: ICD-10-CM

## 2019-11-18 RX ORDER — LOSARTAN POTASSIUM 50 MG/1
TABLET ORAL
Qty: 90 TABLET | Refills: 1 | Status: SHIPPED | OUTPATIENT
Start: 2019-11-18 | End: 2020-05-05

## 2019-11-26 ENCOUNTER — HOSPITAL ENCOUNTER (OUTPATIENT)
Dept: CT IMAGING | Facility: HOSPITAL | Age: 74
Discharge: HOME | End: 2019-11-26
Payer: MEDICARE

## 2019-11-26 ENCOUNTER — OFFICE VISIT (OUTPATIENT)
Dept: ONCOLOGY | Facility: CLINIC | Age: 74
End: 2019-11-26
Payer: MEDICARE

## 2019-11-26 ENCOUNTER — HOSPITAL ENCOUNTER (OUTPATIENT)
Dept: INTERVENTIONAL RADIOLOGY/VASCULAR | Facility: HOSPITAL | Age: 74
Discharge: HOME | End: 2019-11-26
Payer: MEDICARE

## 2019-11-26 ENCOUNTER — APPOINTMENT (OUTPATIENT)
Dept: ONCOLOGY | Facility: CLINIC | Age: 74
End: 2019-11-26
Payer: MEDICARE

## 2019-11-26 VITALS
DIASTOLIC BLOOD PRESSURE: 56 MMHG | TEMPERATURE: 98 F | OXYGEN SATURATION: 96 % | BODY MASS INDEX: 21.44 KG/M2 | RESPIRATION RATE: 15 BRPM | WEIGHT: 117.2 LBS | HEART RATE: 84 BPM | SYSTOLIC BLOOD PRESSURE: 152 MMHG

## 2019-11-26 VITALS — RESPIRATION RATE: 20 BRPM | HEART RATE: 79 BPM | OXYGEN SATURATION: 95 %

## 2019-11-26 DIAGNOSIS — C18.2 MALIGNANT NEOPLASM OF ASCENDING COLON (CMS/HCC): ICD-10-CM

## 2019-11-26 LAB
ALBUMIN SERPL-MCNC: 4.3 G/DL (ref 3.5–5.3)
ALP SERPL-CCNC: 72 U/L (ref 55–142)
ALT SERPL-CCNC: 21 U/L (ref 0–52)
ANION GAP SERPL CALC-SCNC: 7 MMOL/L (ref 3–11)
AST SERPL-CCNC: 26 U/L (ref 0–39)
BILIRUB SERPL-MCNC: 0.74 MG/DL (ref 0–1.4)
BUN SERPL-MCNC: 15 MG/DL (ref 7–25)
CALCIUM ALBUM COR SERPL-MCNC: 9.6 MG/DL (ref 8.6–10.3)
CALCIUM SERPL-MCNC: 9.8 MG/DL (ref 8.6–10.3)
CEA SERPL-MCNC: 1.6 NG/ML (ref 0–9.9)
CHLORIDE SERPL-SCNC: 106 MMOL/L (ref 98–107)
CO2 SERPL-SCNC: 27 MMOL/L (ref 21–32)
CREAT SERPL-MCNC: 0.65 MG/DL (ref 0.6–1.2)
EOSINOPHIL # BLD MANUAL: 0.1 10*3/UL
EOSINOPHIL NFR BLD MANUAL: 2 % (ref 0–3)
ERYTHROCYTE [DISTWIDTH] IN BLOOD BY AUTOMATED COUNT: 13.9 % (ref 11.5–14)
GFR SERPL CREATININE-BSD FRML MDRD: 89 ML/MIN/1.73M*2
GLUCOSE SERPL-MCNC: 109 MG/DL (ref 70–105)
HCT VFR BLD AUTO: 44 % (ref 34–45)
HGB BLD-MCNC: 14.9 G/DL (ref 11.5–15.5)
LYMPHOCYTES # BLD MANUAL: 1.2 10*3/UL
LYMPHOCYTES NFR BLD MANUAL: 29 % (ref 11–47)
MCH RBC QN AUTO: 31.5 PG (ref 28–33)
MCHC RBC AUTO-ENTMCNC: 33.8 G/DL (ref 32–36)
MCV RBC AUTO: 93.1 FL (ref 81–97)
MONOCYTES # BLD MANUAL: 0.2 10*3/UL
MONOCYTES NFR BLD MANUAL: 5 % (ref 3–11)
NEUTROPHILS # BLD MANUAL: 2.69 10*3/UL
NEUTS SEG # BLD MANUAL: 2.7 10*3/UL
NEUTS SEG NFR BLD MANUAL: 64 % (ref 41–81)
PLATELET # BLD AUTO: 185 10*3/UL (ref 140–350)
PLATELET # BLD EST: ADEQUATE 10*3/UL
PMV BLD AUTO: 7 FL (ref 6.9–10.8)
POTASSIUM SERPL-SCNC: 4.4 MMOL/L (ref 3.5–5.1)
PROT SERPL-MCNC: 6.2 G/DL (ref 6–8.3)
RBC # BLD AUTO: 4.72 10*6/ΜL (ref 3.7–5.3)
RBC MORPH BLD: NORMAL
SODIUM SERPL-SCNC: 140 MMOL/L (ref 135–145)
TOTAL CELLS COUNTED BLD: 100 CELLS
WBC # BLD AUTO: 4.2 10*3/UL (ref 4.5–10.5)
WBC NRBC COR # BLD: 4.2 10*3/UL

## 2019-11-26 PROCEDURE — 85007 BL SMEAR W/DIFF WBC COUNT: CPT

## 2019-11-26 PROCEDURE — 85027 COMPLETE CBC AUTOMATED: CPT

## 2019-11-26 PROCEDURE — 99214 OFFICE O/P EST MOD 30 MIN: CPT | Performed by: INTERNAL MEDICINE

## 2019-11-26 PROCEDURE — 80053 COMPREHEN METABOLIC PANEL: CPT

## 2019-11-26 PROCEDURE — 36590 REMOVAL TUNNELED CV CATH: CPT

## 2019-11-26 PROCEDURE — G0463 HOSPITAL OUTPT CLINIC VISIT: HCPCS

## 2019-11-26 PROCEDURE — 6360000200 HC RX 636 W HCPCS (ALT 250 FOR IP): Performed by: INTERNAL MEDICINE

## 2019-11-26 PROCEDURE — 36415 COLL VENOUS BLD VENIPUNCTURE: CPT

## 2019-11-26 PROCEDURE — 74177 CT ABD & PELVIS W/CONTRAST: CPT

## 2019-11-26 PROCEDURE — 2550000100 HC RX 255: Mod: JW | Performed by: INTERNAL MEDICINE

## 2019-11-26 PROCEDURE — 82378 CARCINOEMBRYONIC ANTIGEN: CPT

## 2019-11-26 RX ORDER — HEPARIN 100 UNIT/ML
5 SYRINGE INTRAVENOUS
Status: COMPLETED | OUTPATIENT
Start: 2019-11-26 | End: 2019-11-26

## 2019-11-26 RX ORDER — IOPAMIDOL 755 MG/ML
70 INJECTION, SOLUTION INTRAVASCULAR ONCE
Status: COMPLETED | OUTPATIENT
Start: 2019-11-26 | End: 2019-11-26

## 2019-11-26 RX ADMIN — Medication 5 ML: at 10:53

## 2019-11-26 RX ADMIN — IOPAMIDOL 70 ML: 755 INJECTION, SOLUTION INTRAVENOUS at 11:00

## 2019-11-26 ASSESSMENT — PAIN SCALES - GENERAL: PAINLEVEL: 0-NO PAIN

## 2019-11-26 NOTE — POST-PROCEDURE NOTE
Post Procedure Note    Patient Name: Shama Caballero     : 1945    Radiologist: TRISTEN SIMON MD    Pre-operative Diagnosis: Colon cancer, no longer needs port.,     Operation : Power Port  removal    Anesthesia Type: 1% lidocaine       Estimated Blood Loss: <10ml    Findings: Right Port removed.    Complications:  None; patient tolerated the procedure well.          Prosthetic devices, implanted devices: None

## 2019-11-26 NOTE — PROGRESS NOTES
Medical Oncology Follow-Up    Date of Service: 11/26/2019    Subjective   HISTORY OF PRESENT ILLNESS:  This is a 73-year-old female with colon cancer.  The patient returns oncology clinic for follow-up and adjuvant chemotherapy.     The patient was diagnosed with colon cancer in October 2018 when she presented with 2-year history of anemia and more recent history of right lower quadrant abdominal pain.  She initially underwent a colonoscopy which revealed the presence of colon cancer in ascending colon. She underwent resection on October 25, 2018.  Surgical margins were negative, the tumor extended through pericolonic tissues.  There were 19 lymph nodes removed in 1 was metastatic.  Pathologically was a [id number] colon cancer.  After she recovered from surgery, I saw the patient for consultation in November 2018. Because of her high risk for recurrence I recommended adjuvant chemotherapy with FOLFOX over a period of 6 months.  After the first cycle she had neutropenia which required Neupogen and discontinuation of 5-FU injection.  With the second cycle she had a reaction to oxaliplatin requiring steroid premedication the night before and morning of chemotherapy.    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  With this cycle 9, Dr. Morris decided to stop oxaliplatin altogether because of worsening neurotoxicity and other side effects.  The patient did better with 5-FU alone.  Finished adjuvant chemotheapry     5/2019 restaging showed no evidence of recurrrence, however had post surgical seroma    8/2019  Restaging showed no recurrence or mets, seroma/ hematoma is smaller .    11/26/19 restaging CT showed  No definite CT evidence of recurrent disease to the chest, abdomen or pelvis. No significant change    Today   Fatigue has improved, back to baseline if not better    Denies abdominal complaints such as abdominal pain, diarrhea, constipation, hematemesis, melena,  hematochezia or jaundice.    She has not lost any weight recently.  She gained 3 pounds   Neuropathy in her hands in the form of numbness involving fingertips has been stable.            PAST MEDICAL HISTORY:   Past Medical History:   Diagnosis Date   • Blood disorder     anemic   • Complication of anesthesia    • Diabetes mellitus (CMS/HCC) (HCC)    • Hypertension    • Malignant neoplasm of ascending colon (CMS/HCC) (HCC) 10/19/2018   • PONV (postoperative nausea and vomiting)    • Shortness of breath     low iron        FAMILY HISTORY:   Family History   Problem Relation Age of Onset   • Hypertension Mother    • COPD Mother    • Hypertension Father    • Brain Aneurysm Sister    • Cancer Maternal Grandmother 36        SOCIAL HISTORY:   Social History     Socioeconomic History   • Marital status:      Spouse name: Not on file   • Number of children: Not on file   • Years of education: Not on file   • Highest education level: Not on file   Occupational History   • Not on file   Social Needs   • Financial resource strain: Not on file   • Food insecurity     Worry: Not on file     Inability: Not on file   • Transportation needs     Medical: Not on file     Non-medical: Not on file   Tobacco Use   • Smoking status: Never Smoker   • Smokeless tobacco: Never Used   Substance and Sexual Activity   • Alcohol use: Yes     Alcohol/week: 7.0 standard drinks     Types: 7 Glasses of wine per week     Comment: WEEKLY   • Drug use: No   • Sexual activity: Not on file   Lifestyle   • Physical activity     Days per week: Not on file     Minutes per session: Not on file   • Stress: Not on file   Relationships   • Social connections     Talks on phone: Not on file     Gets together: Not on file     Attends Worship service: Not on file     Active member of club or organization: Not on file     Attends meetings of clubs or organizations: Not on file     Relationship status: Not on file   • Intimate partner violence     Fear of  current or ex partner: Not on file     Emotionally abused: Not on file     Physically abused: Not on file     Forced sexual activity: Not on file   Other Topics Concern   • Not on file   Social History Narrative   • Not on file        ALLERGIES:   Allergies as of 11/26/2019   • (No Known Allergies)        MEDICATIONS:   No current facility-administered medications for this visit.      No current outpatient medications on file.     Facility-Administered Medications Ordered in Other Visits   Medication Dose Route Frequency Provider Last Rate Last Dose   • IsoWater (15 mL iopamidol 76% (ISOVUE-370) + 590 mL water) oral contrast  605 mL oral Once Leyla Olvera MD           REVIEW OF SYSTEMS:   A 14 point systems review was done.  It was negative with the exception of stable neuropathy involving fingers and mild fatigue    The ECOG performance status today is 0          Objective    PHYSICAL EXAM:   /56   Pulse 84   Temp 36.7 °C (98 °F)   Resp 15   Wt 53.2 kg (117 lb 3.2 oz)   SpO2 96%   BMI 21.44 kg/m²    Body mass index is 21.44 kg/m².   Patient  looked  fairly well.  No distress.      HEENT: No scleral icterus.  No oral or pharyngeal lesions     Ln: No lymphadenopathy neck axilla or groin     Lungs: Clear.  No rales, rhonchi or wheezing     CVS: Regular rate and rhythm.  No S3.  No friction rub     Abdomen: Benign nontender.  No hepatosplenomegaly.  No palpable abnormalities     Ext: No edema.  Pulses intact     Musculoskeletal: No muscle or bone tenderness including spine pelvis     Skin: No petechia purpura rash     Neuro: Alert oriented ×3.  No focal deficits.  No significant sensory defect involving fingers            Physical Exam    DIAGNOSTIC REPORTS REVIEWED:      Appointment on 11/26/2019   Component Date Value Ref Range Status   • CEA 11/26/2019 1.6  0.0 - 9.9 ng/mL Final   • Sodium 11/26/2019 140  135 - 145 mmol/L Final   • Potassium 11/26/2019 4.4  3.5 - 5.1 mmol/L Final   • Chloride  11/26/2019 106  98 - 107 mmol/L Final   • CO2 11/26/2019 27  21 - 32 mmol/L Final   • Anion Gap 11/26/2019 7  3 - 11 mmol/L Final   • BUN 11/26/2019 15  7 - 25 mg/dL Final   • Creatinine 11/26/2019 0.65  0.60 - 1.20 mg/dL Final   • Glucose 11/26/2019 109* 70 - 105 mg/dL Final   • Calcium 11/26/2019 9.8  8.6 - 10.3 mg/dL Final   • AST 11/26/2019 26  0 - 39 U/L Final   • ALT (SGPT) 11/26/2019 21  0 - 52 U/L Final   • Alkaline Phosphatase 11/26/2019 72  55 - 142 U/L Final   • Total Protein 11/26/2019 6.2  6.0 - 8.3 g/dL Final   • Albumin 11/26/2019 4.3  3.5 - 5.3 g/dL Final   • Total Bilirubin 11/26/2019 0.74  0.00 - 1.40 mg/dL Final   • eGFR 11/26/2019 89  >60 mL/min/1.73m*2 Final   • Corrected Calcium 11/26/2019 9.6  8.6 - 10.3 mg/dL Final   • WBC 11/26/2019 4.2* 4.5 - 10.5 10*3/uL Final   • RBC 11/26/2019 4.72  3.70 - 5.30 10*6/µL Final   • Hemoglobin 11/26/2019 14.9  11.5 - 15.5 g/dL Final   • Hematocrit 11/26/2019 44.0  34.0 - 45.0 % Final   • MCV 11/26/2019 93.1  81.0 - 97.0 fL Final   • MCH 11/26/2019 31.5  28.0 - 33.0 pg Final   • MCHC 11/26/2019 33.8  32.0 - 36.0 g/dL Final   • RDW 11/26/2019 13.9  11.5 - 14.0 % Final   • Platelets 11/26/2019 185  140 - 350 10*3/uL Final   • MPV 11/26/2019 7.0  6.9 - 10.8 fL Final   • WBC 11/26/2019 4.20  see automated WBC 10*3/uL Final   • Neutrophils% 11/26/2019 64  41 - 81 % Final   • Lymphocytes% 11/26/2019 29  11 - 47 % Final   • Monocytes% 11/26/2019 5  3 - 11 % Final   • Eosinophils% 11/26/2019 2  0 - 3 % Final   • Absolute Neutrophil Count 11/26/2019 2.688  10*3/uL Final   • Segs Absolute 11/26/2019 2.7  10*3/uL Final   • Lymphocytes Absolute 11/26/2019 1.2  10*3/uL Final   • Monocytes Absolute 11/26/2019 0.2  10*3/uL Final   • Eosinophils Absolute 11/26/2019 0.1  10*3/uL Final   • Total Counted 11/26/2019 100  cells Final   • RBC Morphology 11/26/2019 Normal  Normal Final   • Platelet Estimate 11/26/2019 Adequate  Adequate Final              Assessment/Plan     IMPRESSION and PLAN:   This is a 73-year-old female with colon cancer.   Stage III (T3,N1,M0) s/p resection 10/2018  followed by 6 months of adjuvant FOLFOX (oxaliplatin dropped after cycle 9 due to neuropathy)   CT  8/27/19 and 11/26/19  without recurrence     Plan:   -  6 month follow up with scans and labs and CEA till 5 years   - colonoscopy done 10/24/19 next due in one year 10/2020   - port removal today         Physician: Leyla Olvera MD

## 2020-01-29 DIAGNOSIS — I10 ESSENTIAL HYPERTENSION: ICD-10-CM

## 2020-01-29 DIAGNOSIS — E78.5 HYPERLIPIDEMIA, UNSPECIFIED HYPERLIPIDEMIA TYPE: ICD-10-CM

## 2020-01-29 RX ORDER — ATORVASTATIN CALCIUM 20 MG/1
TABLET, FILM COATED ORAL
Qty: 90 TABLET | Refills: 0 | Status: SHIPPED | OUTPATIENT
Start: 2020-01-29 | End: 2020-04-30

## 2020-01-29 RX ORDER — METFORMIN HYDROCHLORIDE 500 MG/1
TABLET ORAL
Qty: 180 TABLET | Refills: 0 | Status: SHIPPED | OUTPATIENT
Start: 2020-01-29 | End: 2020-04-30

## 2020-04-30 ENCOUNTER — TELEPHONE (OUTPATIENT)
Dept: FAMILY MEDICINE | Facility: CLINIC | Age: 75
End: 2020-04-30

## 2020-04-30 DIAGNOSIS — E78.5 HYPERLIPIDEMIA, UNSPECIFIED HYPERLIPIDEMIA TYPE: ICD-10-CM

## 2020-04-30 DIAGNOSIS — I10 ESSENTIAL HYPERTENSION: ICD-10-CM

## 2020-04-30 RX ORDER — ATORVASTATIN CALCIUM 20 MG/1
20 TABLET, FILM COATED ORAL DAILY
Qty: 30 TABLET | Refills: 0 | Status: SHIPPED | OUTPATIENT
Start: 2020-04-30 | End: 2020-05-20 | Stop reason: SDUPTHER

## 2020-04-30 RX ORDER — METFORMIN HYDROCHLORIDE 500 MG/1
TABLET ORAL
Qty: 180 TABLET | OUTPATIENT
Start: 2020-04-30

## 2020-04-30 RX ORDER — METFORMIN HYDROCHLORIDE 500 MG/1
500 TABLET ORAL 2 TIMES DAILY WITH MEALS
Qty: 60 TABLET | Refills: 0 | Status: SHIPPED | OUTPATIENT
Start: 2020-04-30 | End: 2020-05-20 | Stop reason: SDUPTHER

## 2020-04-30 RX ORDER — ATORVASTATIN CALCIUM 20 MG/1
TABLET, FILM COATED ORAL
Qty: 90 TABLET | OUTPATIENT
Start: 2020-04-30

## 2020-04-30 NOTE — TELEPHONE ENCOUNTER
Pt tried to refill meds but they were declined. She was under the impression that we weren't taking appts, I advised Physicals are out to July but we are doing other appts. She would like to speak to nurse. Please call.

## 2020-04-30 NOTE — TELEPHONE ENCOUNTER
Informed patient she hasn't seen Christina since 05/01/18 and she needed to see Christina for further refills, noted refills for 30 days sent in today.  Patient states she has scans and blood draws for her oncology, explained that Christina is the provider for these prescriptions so she does need to f/u with the patient annually for these.  Patient states she will look at her calender and call back to schedule

## 2020-05-02 DIAGNOSIS — I10 ESSENTIAL HYPERTENSION: ICD-10-CM

## 2020-05-04 ENCOUNTER — TELEPHONE (OUTPATIENT)
Dept: FAMILY MEDICINE | Facility: CLINIC | Age: 75
End: 2020-05-04

## 2020-05-04 DIAGNOSIS — E11.9 TYPE 2 DIABETES MELLITUS WITHOUT COMPLICATION, WITHOUT LONG-TERM CURRENT USE OF INSULIN (CMS/HCC): Primary | ICD-10-CM

## 2020-05-04 DIAGNOSIS — D50.9 IRON DEFICIENCY ANEMIA, UNSPECIFIED IRON DEFICIENCY ANEMIA TYPE: ICD-10-CM

## 2020-05-04 NOTE — TELEPHONE ENCOUNTER
Blood draw A1C and faratin-iron test added to her orders, going to come in next Wednesday for labs

## 2020-05-05 RX ORDER — LOSARTAN POTASSIUM 50 MG/1
TABLET ORAL
Qty: 90 TABLET | Refills: 0 | Status: SHIPPED | OUTPATIENT
Start: 2020-05-05 | End: 2020-08-07

## 2020-05-13 ENCOUNTER — APPOINTMENT (OUTPATIENT)
Dept: LAB | Facility: HOSPITAL | Age: 75
End: 2020-05-13
Payer: MEDICARE

## 2020-05-13 DIAGNOSIS — D50.9 IRON DEFICIENCY ANEMIA, UNSPECIFIED IRON DEFICIENCY ANEMIA TYPE: ICD-10-CM

## 2020-05-13 DIAGNOSIS — E11.9 TYPE 2 DIABETES MELLITUS WITHOUT COMPLICATION, WITHOUT LONG-TERM CURRENT USE OF INSULIN (CMS/HCC): ICD-10-CM

## 2020-05-13 DIAGNOSIS — C18.2 MALIGNANT NEOPLASM OF ASCENDING COLON (CMS/HCC): ICD-10-CM

## 2020-05-13 LAB
ALBUMIN SERPL-MCNC: 4.1 G/DL (ref 3.5–5.3)
ALP SERPL-CCNC: 64 U/L (ref 55–142)
ALT SERPL-CCNC: 21 U/L (ref 7–52)
ANION GAP SERPL CALC-SCNC: 3 MMOL/L (ref 3–11)
AST SERPL-CCNC: 24 U/L
BILIRUB SERPL-MCNC: 0.64 MG/DL (ref 0.2–1.4)
BUN SERPL-MCNC: 15 MG/DL (ref 7–25)
CALCIUM ALBUM COR SERPL-MCNC: 9.3 MG/DL (ref 8.6–10.3)
CALCIUM SERPL-MCNC: 9.4 MG/DL (ref 8.6–10.3)
CEA SERPL-MCNC: 1.8 NG/ML (ref 0–9.9)
CHLORIDE SERPL-SCNC: 107 MMOL/L (ref 98–107)
CO2 SERPL-SCNC: 30 MMOL/L (ref 21–32)
CREAT SERPL-MCNC: 0.64 MG/DL (ref 0.6–1.1)
EOSINOPHIL # BLD MANUAL: 0.1 10*3/UL
EOSINOPHIL NFR BLD MANUAL: 2 % (ref 0–3)
ERYTHROCYTE [DISTWIDTH] IN BLOOD BY AUTOMATED COUNT: 13.6 % (ref 11.5–14)
EST. AVERAGE GLUCOSE BLD GHB EST-MCNC: 116.9 MG/DL
FERRITIN SERPL-MCNC: 26.9 NG/ML (ref 11–307)
GFR SERPL CREATININE-BSD FRML MDRD: 89 ML/MIN/1.73M*2
GLUCOSE SERPL-MCNC: 106 MG/DL (ref 70–105)
HBA1C MFR BLD: 5.7 % (ref 4–6)
HCT VFR BLD AUTO: 42.9 % (ref 34–45)
HGB BLD-MCNC: 14.3 G/DL (ref 11.5–15.5)
LYMPHOCYTES # BLD MANUAL: 0.9 10*3/UL
LYMPHOCYTES NFR BLD MANUAL: 21 % (ref 11–47)
MCH RBC QN AUTO: 30.2 PG (ref 28–33)
MCHC RBC AUTO-ENTMCNC: 33.2 G/DL (ref 32–36)
MCV RBC AUTO: 90.9 FL (ref 81–97)
MONOCYTES # BLD MANUAL: 0.3 10*3/UL
MONOCYTES NFR BLD MANUAL: 7 % (ref 3–11)
NEUTROPHILS # BLD MANUAL: 3.01 10*3/UL
NEUTS BAND # BLD MANUAL: 0 10*3/UL
NEUTS BAND NFR BLD MANUAL: 1 % (ref 0–10)
NEUTS SEG # BLD MANUAL: 3 10*3/UL
NEUTS SEG NFR BLD MANUAL: 69 % (ref 41–81)
PLATELET # BLD AUTO: 182 10*3/UL (ref 140–350)
PLATELET CLUMP BLD QL SMEAR: NORMAL
PLATELET MORPHOLOGY IN BLOOD: NORMAL
PMV BLD AUTO: 7.1 FL (ref 6.9–10.8)
POTASSIUM SERPL-SCNC: 4.1 MMOL/L (ref 3.5–5.1)
PROT SERPL-MCNC: 6.3 G/DL (ref 6–8.3)
RBC # BLD AUTO: 4.72 10*6/ΜL (ref 3.7–5.3)
RBC MORPH BLD: NORMAL
SODIUM SERPL-SCNC: 140 MMOL/L (ref 135–145)
TOTAL CELLS COUNTED BLD: 100 CELLS
WBC # BLD AUTO: 4.3 10*3/UL (ref 4.5–10.5)
WBC NRBC COR # BLD: 4.3 10*3/UL

## 2020-05-13 PROCEDURE — 85007 BL SMEAR W/DIFF WBC COUNT: CPT

## 2020-05-13 PROCEDURE — 80053 COMPREHEN METABOLIC PANEL: CPT

## 2020-05-13 PROCEDURE — 82378 CARCINOEMBRYONIC ANTIGEN: CPT

## 2020-05-13 PROCEDURE — 83036 HEMOGLOBIN GLYCOSYLATED A1C: CPT

## 2020-05-13 PROCEDURE — 85027 COMPLETE CBC AUTOMATED: CPT

## 2020-05-13 PROCEDURE — 36415 COLL VENOUS BLD VENIPUNCTURE: CPT

## 2020-05-13 PROCEDURE — 82728 ASSAY OF FERRITIN: CPT

## 2020-05-15 ENCOUNTER — HOSPITAL ENCOUNTER (OUTPATIENT)
Dept: CT IMAGING | Facility: HOSPITAL | Age: 75
Discharge: HOME | End: 2020-05-15
Payer: MEDICARE

## 2020-05-15 DIAGNOSIS — C18.2 MALIGNANT NEOPLASM OF ASCENDING COLON (CMS/HCC): ICD-10-CM

## 2020-05-15 PROCEDURE — 74177 CT ABD & PELVIS W/CONTRAST: CPT | Mod: 26 | Performed by: RADIOLOGY

## 2020-05-15 PROCEDURE — 74177 CT ABD & PELVIS W/CONTRAST: CPT

## 2020-05-15 PROCEDURE — 2550000100 HC RX 255: Performed by: INTERNAL MEDICINE

## 2020-05-15 PROCEDURE — 71260 CT THORAX DX C+: CPT | Mod: 26,51 | Performed by: RADIOLOGY

## 2020-05-15 RX ORDER — IOPAMIDOL 755 MG/ML
70 INJECTION, SOLUTION INTRAVASCULAR ONCE
Status: COMPLETED | OUTPATIENT
Start: 2020-05-15 | End: 2020-05-15

## 2020-05-15 RX ADMIN — IOPAMIDOL 70 ML: 755 INJECTION, SOLUTION INTRAVENOUS at 08:45

## 2020-05-19 ENCOUNTER — TELEPHONE - BILLABLE (OUTPATIENT)
Dept: ONCOLOGY | Facility: CLINIC | Age: 75
End: 2020-05-19
Payer: MEDICARE

## 2020-05-19 DIAGNOSIS — C18.2 MALIGNANT NEOPLASM OF ASCENDING COLON (CMS/HCC): ICD-10-CM

## 2020-05-19 PROCEDURE — 99442 *INACTIVE DO NOT USE* PR PHYS/QHP TELEPHONE EVALUATION 11-20 MIN: CPT | Mod: 95 | Performed by: INTERNAL MEDICINE

## 2020-05-19 NOTE — PROGRESS NOTES
Subjective   Per discussion with Leyla Olvera MD, Shama, has verbally consented to be treated via a telephone based visit: Yes. A total of 15 minutes were required for this telephone based visit.   Patient Location: Home  Provider Location: home  Technology used by Provider: Phone    Medical Oncology Follow-Up    Date of Service: 5/19/2020    Subjective   HISTORY OF PRESENT ILLNESS:  This is a 73-year-old female with colon cancer.  The patient returns oncology clinic for follow-up and adjuvant chemotherapy.     The patient was diagnosed with colon cancer in October 2018 when she presented with 2-year history of anemia and more recent history of right lower quadrant abdominal pain.  She initially underwent a colonoscopy which revealed the presence of colon cancer in ascending colon. She underwent resection on October 25, 2018.  Surgical margins were negative, the tumor extended through pericolonic tissues.  There were 19 lymph nodes removed in 1 was metastatic.  Pathologically was a [id number] colon cancer.  After she recovered from surgery, I saw the patient for consultation in November 2018. Because of her high risk for recurrence I recommended adjuvant chemotherapy with FOLFOX over a period of 6 months.  After the first cycle she had neutropenia which required Neupogen and discontinuation of 5-FU injection.  With the second cycle she had a reaction to oxaliplatin requiring steroid premedication the night before and morning of chemotherapy.    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  With this cycle 9, Dr. Morris decided to stop oxaliplatin altogether because of worsening neurotoxicity and other side effects.  The patient did better with 5-FU alone.  Finished adjuvant chemotheapry     5/2019 restaging showed no evidence of recurrrence, however had post surgical seroma    8/2019  Restaging showed no recurrence or mets, seroma/ hematoma is smaller  .    11/26/19 restaging CT showed  No definite CT evidence of recurrent disease to the chest, abdomen or pelvis. No significant change    5/15/20 Stable appearance of the chest, abdomen and pelvis. No evidence for recurrent or residual neoplasm. No acute abnormality within the chest, abdomen or pelvis.    Today   Fatigue resolved, ECOG 0.   Denies abdominal complaints such as abdominal pain  Constipation, has been there, resolves with fiber.  ( no red flags)   Weight stable.   Neuropathy in her hands improved, stable in her feet           PAST MEDICAL HISTORY:   Past Medical History:   Diagnosis Date   • Blood disorder     anemic   • Complication of anesthesia    • Diabetes mellitus (CMS/HCC) (HCC)    • Hypertension    • Malignant neoplasm of ascending colon (CMS/HCC) (HCC) 10/19/2018   • PONV (postoperative nausea and vomiting)    • Shortness of breath     low iron        FAMILY HISTORY:   Family History   Problem Relation Age of Onset   • Hypertension Mother    • COPD Mother    • Hypertension Father    • Brain Aneurysm Sister    • Cancer Maternal Grandmother 36        SOCIAL HISTORY:   Social History     Socioeconomic History   • Marital status:      Spouse name: Not on file   • Number of children: Not on file   • Years of education: Not on file   • Highest education level: Not on file   Occupational History   • Not on file   Social Needs   • Financial resource strain: Not on file   • Food insecurity     Worry: Not on file     Inability: Not on file   • Transportation needs     Medical: Not on file     Non-medical: Not on file   Tobacco Use   • Smoking status: Never Smoker   • Smokeless tobacco: Never Used   Substance and Sexual Activity   • Alcohol use: Yes     Alcohol/week: 7.0 standard drinks     Types: 7 Glasses of wine per week     Comment: WEEKLY   • Drug use: No   • Sexual activity: Not on file   Lifestyle   • Physical activity     Days per week: Not on file     Minutes per session: Not on file   •  Stress: Not on file   Relationships   • Social connections     Talks on phone: Not on file     Gets together: Not on file     Attends Buddhism service: Not on file     Active member of club or organization: Not on file     Attends meetings of clubs or organizations: Not on file     Relationship status: Not on file   • Intimate partner violence     Fear of current or ex partner: Not on file     Emotionally abused: Not on file     Physically abused: Not on file     Forced sexual activity: Not on file   Other Topics Concern   • Not on file   Social History Narrative   • Not on file        ALLERGIES:   Allergies as of 05/19/2020   • (No Known Allergies)        MEDICATIONS:   Current Outpatient Medications   Medication Sig Dispense Refill   • losartan (COZAAR) 50 mg tablet TAKE 1 TABLET BY MOUTH EVERY DAY 90 tablet 0   • metFORMIN (GLUCOPHAGE) 500 mg tablet Take 1 tablet (500 mg total) by mouth 2 (two) times a day with meals MUST SCHEDULE APPOINTMENT FOR REFILLS 60 tablet 0   • atorvastatin (LIPITOR) 20 mg tablet Take 1 tablet (20 mg total) by mouth daily MUST SCHEDULE APPOINTMENT FOR REFILLS 30 tablet 0   • heparin, porcine, PF, 100 unit/mL syringe Infuse 5 mL into a venous catheter as needed (to flush port) Syringe #2 10 mL 11   • acetaminophen (TYLENOL) 500 mg tablet Take 2 tablets (1,000 mg total) by mouth every 8 (eight) hours as needed for pain scale 1-3/10.  0   • cholecalciferol, vitamin D3, (cholecalciferol) 1,000 unit tablet Take 2,000 Units by mouth daily.     • lancing device with lancets (ACCU-CHEK FASTCLIX) kit Use as directed to check blood sugars twice daily. 100 each 3   • lancets (ACCU-CHEK FASTCLIX) [facility] [place] USE BID TO TEST BLOOD SUGAR LEVELS 204 1   • blood-glucose meter (ACCU-CHEK JOSE MANUEL) misc FPD 1 0   • prochlorperazine (COMPAZINE) 10 mg tablet      • lidocaine-prilocaine (EMLA) cream Apply thick layer to intact skin over port one hour prior to procedure. Cover with occlusive dressing. (Patient not  taking: Reported on 5/19/2020 ) 30 g 11   • sodium chloride injection Infuse 10 mL into a venous catheter as needed (to flush port) Syringe #1 (Patient not taking: Reported on 11/26/2019 ) 20 mL 11     No current facility-administered medications for this visit.        REVIEW OF SYSTEMS:   A 14 point systems review was done.  It was negative with the exception of stable neuropathy involving fingers and mild fatigue    The ECOG performance status today is 0          Objective    PHYSICAL EXAM:   There were no vitals taken for this visit.   There is no height or weight on file to calculate BMI.      Physical Exam  Exam not done, complying with prevention methods of coronavirus spread     DIAGNOSTIC REPORTS REVIEWED:      Appointment on 05/13/2020   Component Date Value Ref Range Status   • Hemoglobin A1C 05/13/2020 5.7  4.0 - 6.0 % Final   • Estimated Average Glucose 05/13/2020 116.9  mg/dL Final   • Ferritin 05/13/2020 26.9  11.0 - 307.0 ng/mL Final   • Sodium 05/13/2020 140  135 - 145 mmol/L Final   • Potassium 05/13/2020 4.1  3.5 - 5.1 mmol/L Final   • Chloride 05/13/2020 107  98 - 107 mmol/L Final   • CO2 05/13/2020 30  21 - 32 mmol/L Final   • Anion Gap 05/13/2020 3  3 - 11 mmol/L Final   • BUN 05/13/2020 15  7 - 25 mg/dL Final   • Creatinine 05/13/2020 0.64  0.60 - 1.10 mg/dL Final   • Glucose 05/13/2020 106* 70 - 105 mg/dL Final   • Calcium 05/13/2020 9.4  8.6 - 10.3 mg/dL Final   • AST 05/13/2020 24  <40 U/L Final   • ALT (SGPT) 05/13/2020 21  7 - 52 U/L Final   • Alkaline Phosphatase 05/13/2020 64  55 - 142 U/L Final   • Total Protein 05/13/2020 6.3  6.0 - 8.3 g/dL Final   • Albumin 05/13/2020 4.1  3.5 - 5.3 g/dL Final   • Total Bilirubin 05/13/2020 0.64  0.20 - 1.40 mg/dL Final   • eGFR 05/13/2020 89  >60 mL/min/1.73m*2 Final   • Corrected Calcium 05/13/2020 9.3  8.6 - 10.3 mg/dL Final   • CEA 05/13/2020 1.8  0.0 - 9.9 ng/mL Final   • WBC 05/13/2020 4.3* 4.5 - 10.5 10*3/uL Final   • RBC 05/13/2020 4.72  3.70  - 5.30 10*6/µL Final   • Hemoglobin 05/13/2020 14.3  11.5 - 15.5 g/dL Final   • Hematocrit 05/13/2020 42.9  34.0 - 45.0 % Final   • MCV 05/13/2020 90.9  81.0 - 97.0 fL Final   • MCH 05/13/2020 30.2  28.0 - 33.0 pg Final   • MCHC 05/13/2020 33.2  32.0 - 36.0 g/dL Final   • RDW 05/13/2020 13.6  11.5 - 14.0 % Final   • Platelets 05/13/2020 182  140 - 350 10*3/uL Final   • MPV 05/13/2020 7.1  6.9 - 10.8 fL Final   • WBC 05/13/2020 4.30  see automated WBC 10*3/uL Final   • Neutrophils% 05/13/2020 69  41 - 81 % Final   • Bands% 05/13/2020 1  0 - 10 % Final   • Lymphocytes% 05/13/2020 21  11 - 47 % Final   • Monocytes% 05/13/2020 7  3 - 11 % Final   • Eosinophils% 05/13/2020 2  0 - 3 % Final   • Absolute Neutrophil Count 05/13/2020 3.010  10*3/uL Final   • Segs Absolute 05/13/2020 3.0  10*3/uL Final   • Bands Absolute 05/13/2020 0.0  10*3/uL Final   • Lymphocytes Absolute 05/13/2020 0.9  10*3/uL Final   • Monocytes Absolute 05/13/2020 0.3  10*3/uL Final   • Eosinophils Absolute 05/13/2020 0.1  10*3/uL Final   • Total Counted 05/13/2020 100  cells Final   • RBC Morphology 05/13/2020 Normal  Normal Final   • Platelet Morphology 05/13/2020 Normal  Normal Final   • Clumped Platelets 05/13/2020 None Seen  None Seen Final              Assessment/Plan    IMPRESSION and PLAN:   This is a 73-year-old female with colon cancer.   Stage III (T3,N1,M0) s/p resection 10/2018  followed by 6 months of adjuvant FOLFOX (oxaliplatin dropped after cycle 9 due to neuropathy)   CT 5/2020 without recurrence     Plan:   -  6 month follow up with scans and labs and CEA till 5 years ( till 10/2023)  - colonoscopy done 10/24/19 next due in one year 10/2020   - port removed  - discussed increasing fiber intake. For constipation        Physician: Leyla Olvera MD

## 2020-05-20 ENCOUNTER — OFFICE VISIT (OUTPATIENT)
Dept: FAMILY MEDICINE | Facility: CLINIC | Age: 75
End: 2020-05-20
Payer: MEDICARE

## 2020-05-20 VITALS
SYSTOLIC BLOOD PRESSURE: 144 MMHG | BODY MASS INDEX: 21.6 KG/M2 | WEIGHT: 117.4 LBS | TEMPERATURE: 97 F | RESPIRATION RATE: 16 BRPM | OXYGEN SATURATION: 96 % | DIASTOLIC BLOOD PRESSURE: 70 MMHG | HEART RATE: 80 BPM | HEIGHT: 62 IN

## 2020-05-20 DIAGNOSIS — I10 ESSENTIAL HYPERTENSION: ICD-10-CM

## 2020-05-20 DIAGNOSIS — K59.09 CHRONIC CONSTIPATION: ICD-10-CM

## 2020-05-20 DIAGNOSIS — E78.5 HYPERLIPIDEMIA, UNSPECIFIED HYPERLIPIDEMIA TYPE: ICD-10-CM

## 2020-05-20 DIAGNOSIS — C18.2 MALIGNANT NEOPLASM OF ASCENDING COLON (CMS/HCC): ICD-10-CM

## 2020-05-20 DIAGNOSIS — D50.9 IRON DEFICIENCY ANEMIA, UNSPECIFIED IRON DEFICIENCY ANEMIA TYPE: ICD-10-CM

## 2020-05-20 DIAGNOSIS — E11.9 TYPE 2 DIABETES MELLITUS WITHOUT COMPLICATION, WITHOUT LONG-TERM CURRENT USE OF INSULIN (CMS/HCC): Primary | ICD-10-CM

## 2020-05-20 PROCEDURE — G0463 HOSPITAL OUTPT CLINIC VISIT: HCPCS | Performed by: NURSE PRACTITIONER

## 2020-05-20 PROCEDURE — 99213 OFFICE O/P EST LOW 20 MIN: CPT | Performed by: NURSE PRACTITIONER

## 2020-05-20 RX ORDER — ATORVASTATIN CALCIUM 20 MG/1
20 TABLET, FILM COATED ORAL DAILY
Qty: 90 TABLET | Refills: 3 | Status: SHIPPED | OUTPATIENT
Start: 2020-05-20 | End: 2020-11-11 | Stop reason: SDUPTHER

## 2020-05-20 RX ORDER — METFORMIN HYDROCHLORIDE 500 MG/1
500 TABLET ORAL 2 TIMES DAILY WITH MEALS
Qty: 180 TABLET | Refills: 1 | Status: SHIPPED | OUTPATIENT
Start: 2020-05-20 | End: 2020-11-11 | Stop reason: SDUPTHER

## 2020-05-20 ASSESSMENT — ENCOUNTER SYMPTOMS
SORE THROAT: 1
WEAKNESS: 0
NUMBNESS: 1
TREMORS: 0
ARTHRALGIAS: 1
DIFFICULTY URINATING: 0
BLOOD IN STOOL: 0
HEADACHES: 0
NERVOUS/ANXIOUS: 0
ADENOPATHY: 0
FEVER: 0
COUGH: 0
NAUSEA: 0
BRUISES/BLEEDS EASILY: 0
DIARRHEA: 0
SINUS PRESSURE: 0
DIZZINESS: 0
CONSTIPATION: 1
ABDOMINAL PAIN: 0
DYSURIA: 0
SINUS PAIN: 0
DIAPHORESIS: 0
SLEEP DISTURBANCE: 0
PALPITATIONS: 0
DECREASED CONCENTRATION: 0
FATIGUE: 0
UNEXPECTED WEIGHT CHANGE: 0
SHORTNESS OF BREATH: 0

## 2020-05-20 ASSESSMENT — PAIN SCALES - GENERAL: PAINLEVEL: 0-NO PAIN

## 2020-05-20 NOTE — PROGRESS NOTES
Shama Caballero          [id number]      CHIEF COMPLAINT/REASON FOR VISIT  Shama Caballero is a 74 y.o. female who presents for:  Chief Complaint   Patient presents with   • Med Management     [facility]   • Diabetes     type 2 diabetic, blood sugars one time daily-this am was 95   • Hyperlipidemia   • History of colon cancer     port-a-cath out; 12 chemo treatments   • Hypertension     blood pressure check; takes home blood pressures(teens over 65-70)       HISTORY OF PRESENT ILLNESS:  HPI   She is doing well.  Had a good checkup yesterday with oncology and does not have to go back for 6 months.  He saw her labs.  She is not taking iron any more and her ferritin has dropped some but is still in the low normals.  Unfortunately, a lipid panel was not done.      Some constipation, going to try metamucil first.  Drinks plenty of water, walking at least 2 miles per day.      Denies SOB and cough.  Checking BS daily, 95 this morning.      Patient Care Team:  Christina Singh CNP as PCP - General (Family Medicine)     The following portions of the patient's history were reviewed and updated as appropriate: allergies, current medications, family history, medical history, social history, surgical history and problem list.     MEDICATIONS:     Current Outpatient Medications:   •  metFORMIN (GLUCOPHAGE) 500 mg tablet, Take 1 tablet (500 mg total) by mouth 2 (two) times a day with meals, Disp: 180 tablet, Rfl: 1  •  atorvastatin (LIPITOR) 20 mg tablet, Take 1 tablet (20 mg total) by mouth daily MUST SCHEDULE APPOINTMENT FOR REFILLS, Disp: 90 tablet, Rfl: 3  •  losartan (COZAAR) 50 mg tablet, TAKE 1 TABLET BY MOUTH EVERY DAY, Disp: 90 tablet, Rfl: 0  •  cholecalciferol, vitamin D3, (cholecalciferol) 1,000 unit tablet, Take 2,000 Units by mouth daily., Disp: , Rfl:   •  lancets (ACCU-CHEK FASTCLIX) [facility] [place] BID TO TEST BLOOD SUGAR LEVELS, Disp: 204, Rfl: 1  •  blood-glucose meter (ACCU-CHEK JOSE MANUEL) misc, FPD, Disp: 1, Rfl: 0  •   acetaminophen (TYLENOL) 500 mg tablet, Take 2 tablets (1,000 mg total) by mouth every 8 (eight) hours as needed for pain scale 1-3/10., Disp: , Rfl: 0    REVIEW OF SYSTEMS  Review of Systems   Constitutional: Negative for diaphoresis, fatigue, fever and unexpected weight change.   HENT: Positive for congestion and sore throat. Negative for ear pain, sinus pressure and sinus pain.    Respiratory: Negative for cough and shortness of breath.    Cardiovascular: Negative for chest pain, palpitations and leg swelling.   Gastrointestinal: Positive for constipation. Negative for abdominal pain, blood in stool, diarrhea and nausea.   Genitourinary: Negative for difficulty urinating and dysuria.   Musculoskeletal: Positive for arthralgias.   Skin: Negative for rash.   Allergic/Immunologic: Positive for environmental allergies.   Neurological: Positive for numbness. Negative for dizziness, tremors, weakness and headaches.   Hematological: Negative for adenopathy. Does not bruise/bleed easily.   Psychiatric/Behavioral: Negative for decreased concentration and sleep disturbance. The patient is not nervous/anxious.        VITALS  Vitals:    05/20/20 0837   BP: 144/70   Pulse:    Resp:    Temp:    SpO2: 96%     Body mass index is 21.47 kg/m².    Wt Readings from Last 3 Encounters:   05/20/20 53.3 kg (117 lb 6.4 oz)   11/26/19 53.2 kg (117 lb 3.2 oz)   08/27/19 51.3 kg (113 lb)     Temp Readings from Last 3 Encounters:   05/20/20 36.1 °C (97 °F) (Temporal)   11/26/19 36.7 °C (98 °F)   08/27/19 36.8 °C (98.3 °F) (Temporal)     BP Readings from Last 3 Encounters:   05/20/20 144/70   11/26/19 152/56   08/27/19 123/74     Pulse Readings from Last 3 Encounters:   05/20/20 80   11/26/19 79   11/26/19 84       PHYSICAL EXAMINATION:  Physical Exam  She looks well, skin warm and dry, HRR, lungs CTA, abdomen is soft and nontender.  TMs are clear, pharynx pink and moist, no cervical lymphadenopathy, no edema    Assessment/Plan   Diagnoses and  all orders for this visit:    Type 2 diabetes mellitus without complication, without long-term current use of insulin (CMS/[facility]) ([facility])  -     Hemoglobin A1c (glycosylated) Blood, Venous; Future    Essential hypertension  -     metFORMIN (GLUCOPHAGE) 500 mg tablet; Take 1 tablet (500 mg total) by mouth 2 (two) times a day with meals    Hyperlipidemia, unspecified hyperlipidemia type  -     atorvastatin (LIPITOR) 20 mg tablet; Take 1 tablet (20 mg total) by mouth daily MUST SCHEDULE APPOINTMENT FOR REFILLS  -     Lipid panel Blood, Venous; Future    Malignant neoplasm of ascending colon (CMS/[facility]) ([facility])    Iron deficiency anemia, unspecified iron deficiency anemia type    Chronic constipation      With her next lab draw, we need a lipid panel as well as an A1C.  Lab has been ordered for Dr. Esteban, will add to that.    MICHAEL BOYD, CNP

## 2020-08-06 DIAGNOSIS — I10 ESSENTIAL HYPERTENSION: ICD-10-CM

## 2020-08-07 RX ORDER — LOSARTAN POTASSIUM 50 MG/1
TABLET ORAL
Qty: 90 TABLET | Refills: 0 | Status: SHIPPED | OUTPATIENT
Start: 2020-08-07 | End: 2020-11-11 | Stop reason: SDUPTHER

## 2020-08-12 NOTE — ANESTHESIA PREPROCEDURE EVALUATION
"Pre-Procedure Assessment    Patient: Shama Caballero, female, 73 y.o.    Ht Readings from Last 1 Encounters:   11/16/18 1.575 m (5' 2\")     Wt Readings from Last 1 Encounters:   11/16/18 49.6 kg (109 lb 6.4 oz)       Last Vitals  /96 (11/30/18 0742)    Temp 36.6 °C (97.8 °F) (11/30/18 0742)    Pulse 81 (11/30/18 0742)   Resp 16 (11/30/18 0742)    SpO2 96 % (11/30/18 0742)    Pain Score         Problem list reviewed and Medical history reviewed    History of anesthetic complications: PONV         Airway   Mallampati: I  TM distance: >3 FB  Neck ROM: full      Dental - normal exam     Pulmonary - normal exam    breath sounds clear to auscultation  (+) shortness of breath (r/t low iron),   Cardiovascular - normal exam  Exercise tolerance: good (4-7 METS)  (+) hypertension well controlled,     Rhythm: regular  Rate: normal  ROS comment: 10/18  Sinus rhythm  . Minimal ST depression, lateral leads    Mental Status/Neuro/Psych - negative ROS   Pt is alert.        GI/Hepatic/Renal      Comments: Colon cancer s/p hemicolectomy 10.25.18      Endo/Other    (+) diabetes mellitus (A1C 5.2018 - 5.8) type 2 well controlled, history of cancer (colon),     Comments: Iron deficiency anemia  Abdominal  - normal exam          Social History     Tobacco Use   • Smoking status: Never Smoker   • Smokeless tobacco: Never Used   Substance Use Topics   • Alcohol use: Yes     Alcohol/week: 4.2 oz     Types: 7 Glasses of wine per week     Comment: WEEKLY      Hematology   Lab Results   Component Value Date/Time    WBC 6.0 11/16/2018 04:13 PM    RBC 4.73 11/16/2018 04:13 PM    MCV 78.2 (L) 11/16/2018 04:13 PM    HGB 11.7 11/16/2018 04:13 PM    HCT 37.0 11/16/2018 04:13 PM     11/16/2018 04:13 PM      Coagulation No results found for: PT, APTT, INR   General Chemistry   Lab Results   Component Value Date/Time    POCGLU 105 10/27/2018 07:29 AM    CALCIUM 9.9 11/16/2018 04:13 PM    BUN 17 11/16/2018 04:13 PM    CREATININE 0.70 " Addended by: MAKI ANDREWS on: 8/12/2020 10:00 AM     Modules accepted: Orders     11/16/2018 04:13 PM    GLUCOSE 112 (H) 11/16/2018 04:13 PM     11/16/2018 04:13 PM    K 4.1 11/16/2018 04:13 PM    CO2 26 11/16/2018 04:13 PM     11/16/2018 04:13 PM     Anesthesia Plan    ASA 3   NPO status reviewed: > 8 hours    MAC         Induction: intravenous                 Anesthetic plan and risks discussed with patient.  Use of blood products discussed with patient whom consented to blood products.

## 2020-11-09 ENCOUNTER — TELEPHONE (OUTPATIENT)
Dept: FAMILY MEDICINE | Facility: CLINIC | Age: 75
End: 2020-11-09

## 2020-11-09 DIAGNOSIS — I10 ESSENTIAL HYPERTENSION: ICD-10-CM

## 2020-11-09 RX ORDER — LOSARTAN POTASSIUM 50 MG/1
TABLET ORAL
Qty: 90 TABLET | Refills: 0 | OUTPATIENT
Start: 2020-11-09

## 2020-11-09 RX ORDER — METFORMIN HYDROCHLORIDE 500 MG/1
TABLET ORAL
Qty: 180 TABLET | Refills: 1 | OUTPATIENT
Start: 2020-11-09

## 2020-11-09 NOTE — TELEPHONE ENCOUNTER
Called patient and informed her it has been six months and she is due for a 6 month appointment.  She states she would rather just see Christina every 1-2 years, and I explained with being on diabetic and hypertension medications she will always need to be seen every 6 months by her provider.  She agreed to be transferred to scheduling for an apt this Wednesday.  Patient states, and med record shows, that patients labs will be done by oncology on Thursday.  Will refill medications at her apt.

## 2020-11-11 ENCOUNTER — OFFICE VISIT (OUTPATIENT)
Dept: FAMILY MEDICINE | Facility: CLINIC | Age: 75
End: 2020-11-11
Payer: MEDICARE

## 2020-11-11 VITALS
TEMPERATURE: 97.7 F | OXYGEN SATURATION: 96 % | RESPIRATION RATE: 16 BRPM | WEIGHT: 118 LBS | BODY MASS INDEX: 21.58 KG/M2 | SYSTOLIC BLOOD PRESSURE: 130 MMHG | DIASTOLIC BLOOD PRESSURE: 70 MMHG | HEART RATE: 93 BPM

## 2020-11-11 DIAGNOSIS — I10 ESSENTIAL HYPERTENSION: ICD-10-CM

## 2020-11-11 DIAGNOSIS — E78.5 HYPERLIPIDEMIA, UNSPECIFIED HYPERLIPIDEMIA TYPE: ICD-10-CM

## 2020-11-11 PROCEDURE — 99213 OFFICE O/P EST LOW 20 MIN: CPT | Performed by: NURSE PRACTITIONER

## 2020-11-11 PROCEDURE — G0463 HOSPITAL OUTPT CLINIC VISIT: HCPCS | Performed by: NURSE PRACTITIONER

## 2020-11-11 RX ORDER — METFORMIN HYDROCHLORIDE 500 MG/1
500 TABLET ORAL 2 TIMES DAILY WITH MEALS
Qty: 180 TABLET | Refills: 1 | Status: SHIPPED | OUTPATIENT
Start: 2020-11-11 | End: 2021-02-11

## 2020-11-11 RX ORDER — LOSARTAN POTASSIUM 50 MG/1
50 TABLET ORAL DAILY
Qty: 90 TABLET | Refills: 1 | Status: SHIPPED | OUTPATIENT
Start: 2020-11-11 | End: 2021-02-11

## 2020-11-11 RX ORDER — ATORVASTATIN CALCIUM 20 MG/1
20 TABLET, FILM COATED ORAL DAILY
Qty: 90 TABLET | Refills: 1 | Status: SHIPPED | OUTPATIENT
Start: 2020-11-11 | End: 2021-05-13 | Stop reason: SDUPTHER

## 2020-11-11 ASSESSMENT — ENCOUNTER SYMPTOMS
FATIGUE: 0
ACTIVITY CHANGE: 0
DYSURIA: 0
SHORTNESS OF BREATH: 0
ADENOPATHY: 0
APPETITE CHANGE: 0
HEADACHES: 0
DIZZINESS: 0
UNEXPECTED WEIGHT CHANGE: 0
BLOOD IN STOOL: 0
FEVER: 0
NAUSEA: 0
COUGH: 0
ABDOMINAL PAIN: 0
PALPITATIONS: 0
SLEEP DISTURBANCE: 0
DIARRHEA: 0
CONSTIPATION: 0

## 2020-11-11 ASSESSMENT — PAIN SCALES - GENERAL: PAINLEVEL: 0-NO PAIN

## 2020-11-11 NOTE — PROGRESS NOTES
Shama Caballero          [id number]      CHIEF COMPLAINT/REASON FOR VISIT  Shama Caballero is a 75 y.o. female who presents for:  Chief Complaint   Patient presents with   • Med Refill     no new concerns, just needs medications filled [place]       HISTORY OF PRESENT ILLNESS:  HPI  Will have labs done tomorrow.  Needs meds refilled.  Will need to see in 6 months.  Leaving for the winter soon.  Oncology check with labs.  BP has been good.      Patient Care Team:  Christina Singh CNP as PCP - General (Family Medicine)     The following portions of the patient's history were reviewed and updated as appropriate: allergies, current medications, family history, medical history, social history, surgical history and problem list.     MEDICATIONS:     Current Outpatient Medications:   •  losartan (COZAAR) 50 mg tablet, Take 1 tablet (50 mg total) by mouth daily, Disp: 90 tablet, Rfl: 1  •  metFORMIN (GLUCOPHAGE) 500 mg tablet, Take 1 tablet (500 mg total) by mouth 2 (two) times a day with meals, Disp: 180 tablet, Rfl: 1  •  atorvastatin (LIPITOR) 20 mg tablet, Take 1 tablet (20 mg total) by mouth daily, Disp: 90 tablet, Rfl: 1  •  acetaminophen (TYLENOL) 500 mg tablet, Take 2 tablets (1,000 mg total) by mouth every 8 (eight) hours as needed for pain scale 1-3/10., Disp: , Rfl: 0  •  cholecalciferol, vitamin D3, (cholecalciferol) 1,000 unit tablet, Take 2,000 Units by mouth daily., Disp: , Rfl:   •  lancets (ACCU-CHEK FASTCLIX) [facility] [place], USE BID TO TEST BLOOD SUGAR LEVELS, Disp: 204, Rfl: 1  •  blood-glucose meter (ACCU-CHEK JOSE MANUEL) misc, FPD, Disp: 1, Rfl: 0    REVIEW OF SYSTEMS  Review of Systems   Constitutional: Negative for activity change, appetite change, fatigue, fever and unexpected weight change.   Respiratory: Negative for cough and shortness of breath.    Cardiovascular: Negative for chest pain, palpitations and leg swelling.   Gastrointestinal: Negative for abdominal pain, blood in stool,  constipation, diarrhea and nausea.   Genitourinary: Negative for dysuria.   Neurological: Negative for dizziness and headaches.   Hematological: Negative for adenopathy.   Psychiatric/Behavioral: Negative for sleep disturbance.       VITALS  Vitals:    11/11/20 1504   BP: 130/70   Pulse: 93   Resp: 16   Temp: 36.5 °C (97.7 °F)   SpO2: 96%     Body mass index is 21.58 kg/m².    Wt Readings from Last 3 Encounters:   11/11/20 53.5 kg (118 lb)   05/20/20 53.3 kg (117 lb 6.4 oz)   11/26/19 53.2 kg (117 lb 3.2 oz)     Temp Readings from Last 3 Encounters:   11/11/20 36.5 °C (97.7 °F) (Temporal)   05/20/20 36.1 °C (97 °F) (Temporal)   11/26/19 36.7 °C (98 °F)     BP Readings from Last 3 Encounters:   11/11/20 130/70   05/20/20 144/70   11/26/19 152/56     Pulse Readings from Last 3 Encounters:   11/11/20 93   05/20/20 80   11/26/19 79       PHYSICAL EXAMINATION:  Physical Exam  She looks well, pleasant and cooperative, skin warm and dry, no edema, HRR, lungs CTA    Assessment/Plan   Diagnoses and all orders for this visit:    Essential hypertension  -     losartan (COZAAR) 50 mg tablet; Take 1 tablet (50 mg total) by mouth daily  -     metFORMIN (GLUCOPHAGE) 500 mg tablet; Take 1 tablet (500 mg total) by mouth 2 (two) times a day with meals    Hyperlipidemia, unspecified hyperlipidemia type  -     atorvastatin (LIPITOR) 20 mg tablet; Take 1 tablet (20 mg total) by mouth daily      Added labs for a1c and lipid panel to labs requested by oncology.  Meds refilled.  Wish her a good winter in [place].  MICHAEL BOYD, [place]

## 2020-11-12 ENCOUNTER — OFFICE VISIT (OUTPATIENT)
Dept: ONCOLOGY | Facility: CLINIC | Age: 75
End: 2020-11-12
Payer: MEDICARE

## 2020-11-12 ENCOUNTER — HOSPITAL ENCOUNTER (OUTPATIENT)
Dept: CT IMAGING | Facility: HOSPITAL | Age: 75
Discharge: HOME | End: 2020-11-12
Payer: MEDICARE

## 2020-11-12 ENCOUNTER — APPOINTMENT (OUTPATIENT)
Dept: ONCOLOGY | Facility: CLINIC | Age: 75
End: 2020-11-12
Payer: MEDICARE

## 2020-11-12 VITALS
RESPIRATION RATE: 16 BRPM | WEIGHT: 119 LBS | BODY MASS INDEX: 21.76 KG/M2 | HEART RATE: 82 BPM | OXYGEN SATURATION: 96 % | SYSTOLIC BLOOD PRESSURE: 110 MMHG | TEMPERATURE: 99.4 F | DIASTOLIC BLOOD PRESSURE: 52 MMHG

## 2020-11-12 DIAGNOSIS — C18.2 MALIGNANT NEOPLASM OF ASCENDING COLON (CMS/HCC): ICD-10-CM

## 2020-11-12 LAB
ALBUMIN SERPL-MCNC: 4.4 G/DL (ref 3.5–5.3)
ALP SERPL-CCNC: 75 U/L (ref 55–142)
ALT SERPL-CCNC: 22 U/L (ref 7–52)
ANION GAP SERPL CALC-SCNC: 12 MMOL/L (ref 3–11)
AST SERPL-CCNC: 25 U/L
BASOPHILS # BLD AUTO: 0 10*3/UL
BASOPHILS NFR BLD AUTO: 1 % (ref 0–2)
BILIRUB SERPL-MCNC: 0.31 MG/DL (ref 0.2–1.4)
BUN SERPL-MCNC: 17 MG/DL (ref 7–25)
CALCIUM ALBUM COR SERPL-MCNC: 8.5 MG/DL (ref 8.6–10.3)
CALCIUM SERPL-MCNC: 8.8 MG/DL (ref 8.6–10.3)
CEA SERPL-MCNC: 1.6 NG/ML (ref 0–9.9)
CHLORIDE SERPL-SCNC: 106 MMOL/L (ref 98–107)
CO2 SERPL-SCNC: 22 MMOL/L (ref 21–32)
CREAT SERPL-MCNC: 0.71 MG/DL (ref 0.6–1.1)
EOSINOPHIL # BLD AUTO: 0 10*3/UL
EOSINOPHIL NFR BLD AUTO: 1 % (ref 0–3)
ERYTHROCYTE [DISTWIDTH] IN BLOOD BY AUTOMATED COUNT: 14.3 % (ref 11.5–14)
GFR SERPL CREATININE-BSD FRML MDRD: 84 ML/MIN/1.73M*2
GLUCOSE SERPL-MCNC: 103 MG/DL (ref 70–105)
HCT VFR BLD AUTO: 44 % (ref 34–45)
HGB BLD-MCNC: 14.7 G/DL (ref 11.5–15.5)
LYMPHOCYTES # BLD AUTO: 1.5 10*3/UL
LYMPHOCYTES NFR BLD AUTO: 37 % (ref 11–47)
MCH RBC QN AUTO: 30.6 PG (ref 28–33)
MCHC RBC AUTO-ENTMCNC: 33.4 G/DL (ref 32–36)
MCV RBC AUTO: 91.5 FL (ref 81–97)
MONOCYTES # BLD AUTO: 0.6 10*3/UL
MONOCYTES NFR BLD AUTO: 15 % (ref 3–11)
NEUTROPHILS # BLD AUTO: 2 10*3/UL
NEUTROPHILS NFR BLD AUTO: 47 % (ref 41–81)
PLATELET # BLD AUTO: 155 10*3/UL (ref 140–350)
PMV BLD AUTO: 7.2 FL (ref 6.9–10.8)
POTASSIUM SERPL-SCNC: 4.3 MMOL/L (ref 3.5–5.1)
PROT SERPL-MCNC: 6.3 G/DL (ref 6–8.3)
RBC # BLD AUTO: 4.81 10*6/ΜL (ref 3.7–5.3)
SODIUM SERPL-SCNC: 140 MMOL/L (ref 135–145)
WBC # BLD AUTO: 4.1 10*3/UL (ref 4.5–10.5)

## 2020-11-12 PROCEDURE — G0463 HOSPITAL OUTPT CLINIC VISIT: HCPCS

## 2020-11-12 PROCEDURE — 80053 COMPREHEN METABOLIC PANEL: CPT

## 2020-11-12 PROCEDURE — 74177 CT ABD & PELVIS W/CONTRAST: CPT | Mod: MG

## 2020-11-12 PROCEDURE — 99214 OFFICE O/P EST MOD 30 MIN: CPT | Performed by: INTERNAL MEDICINE

## 2020-11-12 PROCEDURE — 82378 CARCINOEMBRYONIC ANTIGEN: CPT

## 2020-11-12 PROCEDURE — 2550000100 HC RX 255: Performed by: INTERNAL MEDICINE

## 2020-11-12 PROCEDURE — 85025 COMPLETE CBC W/AUTO DIFF WBC: CPT

## 2020-11-12 PROCEDURE — 36415 COLL VENOUS BLD VENIPUNCTURE: CPT

## 2020-11-12 RX ORDER — IOPAMIDOL 755 MG/ML
70 INJECTION, SOLUTION INTRAVASCULAR ONCE
Status: COMPLETED | OUTPATIENT
Start: 2020-11-12 | End: 2020-11-12

## 2020-11-12 RX ADMIN — IOPAMIDOL 70 ML: 755 INJECTION, SOLUTION INTRAVENOUS at 09:30

## 2020-11-12 ASSESSMENT — PAIN SCALES - GENERAL: PAINLEVEL: 0-NO PAIN

## 2020-11-12 NOTE — PATIENT INSTRUCTIONS
DISCHARGE INSTRUCTIONS FOR RADIOLOGY/CT PROCEDURES WITH IV CONTRAST FOR NON DIABETIC AND DIABETIC PATIENTS WITH GFR <30     If you have any of the following conditions listed below or if you are on fluid restrictions, please contact your primary care doctor for instructions.    • Heart failure  • Dialysis patients  • Liver disease  • Low sodium patients  • Any other fluid restriction diet    You have had a Radiology/CT procedure in which you have received IV contrast (x-ray  dye). The kidneys will excrete this IV contrast within the next 24 hours. It is always important to maintain good hydration, and is especially important following IV contrast administration. As such, we encourage you to drink at least 4 to 8 extra 8 ounce glasses of water during the next 24 hours to help your kidneys clear the contrast from your body.

## 2020-11-12 NOTE — PROGRESS NOTES
Medical Oncology Follow-Up    Date of Service: 11/12/2020    Subjective   HISTORY OF PRESENT ILLNESS:  This is a 73-year-old female with colon cancer.  The patient returns oncology clinic for follow-up and adjuvant chemotherapy.     The patient was diagnosed with colon cancer in October 2018 when she presented with 2-year history of anemia and more recent history of right lower quadrant abdominal pain.  She initially underwent a colonoscopy which revealed the presence of colon cancer in ascending colon. She underwent resection on October 25, 2018.  Surgical margins were negative, the tumor extended through pericolonic tissues.  There were 19 lymph nodes removed in 1 was metastatic.  Pathologically was a [id number] colon cancer.  After she recovered from surgery, I saw the patient for consultation in November 2018. Because of her high risk for recurrence I recommended adjuvant chemotherapy with FOLFOX over a period of 6 months.  After the first cycle she had neutropenia which required Neupogen and discontinuation of 5-FU injection.  With the second cycle she had a reaction to oxaliplatin requiring steroid premedication the night before and morning of chemotherapy.    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  With this cycle 9, Dr. Morris decided to stop oxaliplatin altogether because of worsening neurotoxicity and other side effects.  The patient did better with 5-FU alone.  Finished adjuvant chemotheapry     5/2019 restaging showed no evidence of recurrrence, however had post surgical seroma    8/2019  Restaging showed no recurrence or mets, seroma/ hematoma is smaller .    11/26/19 restaging CT showed  No definite CT evidence of recurrent disease to the chest, abdomen or pelvis. No significant change    5/15/20 Stable appearance of the chest, abdomen and pelvis. No evidence for recurrent or residual neoplasm. No acute abnormality within the chest, abdomen or  pelvis.    11/12/20 CT restaging showed  Negative chest. Negative abdomen. Negative pelvis.      Today:   Lost her daughter in a car accident in 8/2020  Fatigue resolved, ECOG 0.   Denies abdominal complaints such as abdominal pain.  Constipation, has been there, resolves with fiber.  ( no red flags).  Weight stable.   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           PAST MEDICAL HISTORY:   Past Medical History:   Diagnosis Date   • Blood disorder     anemic   • Complication of anesthesia    • Diabetes mellitus (CMS/HCC) (HCC)    • Hypertension    • Malignant neoplasm of ascending colon (CMS/HCC) (HCC) 10/19/2018   • PONV (postoperative nausea and vomiting)    • Shortness of breath     low iron        FAMILY HISTORY:   Family History   Problem Relation Age of Onset   • Hypertension Mother    • COPD Mother    • Hypertension Father    • Brain Aneurysm Sister    • Cancer Maternal Grandmother 36        SOCIAL HISTORY:   Social History     Socioeconomic History   • Marital status:      Spouse name: Not on file   • Number of children: Not on file   • Years of education: Not on file   • Highest education level: Not on file   Occupational History   • Not on file   Social Needs   • Financial resource strain: Not on file   • Food insecurity     Worry: Not on file     Inability: Not on file   • Transportation needs     Medical: Not on file     Non-medical: Not on file   Tobacco Use   • Smoking status: Never Smoker   • Smokeless tobacco: Never Used   Substance and Sexual Activity   • Alcohol use: Yes     Alcohol/week: 7.0 standard drinks     Types: 7 Glasses of wine per week     Comment: WEEKLY   • Drug use: No   • Sexual activity: Not on file   Lifestyle   • Physical activity     Days per week: Not on file     Minutes per session: Not on file   • Stress: Not on file   Relationships   • Social connections     Talks on phone: Not on file     Gets together: Not on file     Attends Scientologist service: Not on  file     Active member of club or organization: Not on file     Attends meetings of clubs or organizations: Not on file     Relationship status: Not on file   • Intimate partner violence     Fear of current or ex partner: Not on file     Emotionally abused: Not on file     Physically abused: Not on file     Forced sexual activity: Not on file   Other Topics Concern   • Not on file   Social History Narrative   • Not on file        ALLERGIES:   Allergies as of 11/12/2020   • (No Known Allergies)        MEDICATIONS:   Current Outpatient Medications   Medication Sig Dispense Refill   • losartan (COZAAR) 50 mg tablet Take 1 tablet (50 mg total) by mouth daily 90 tablet 1   • metFORMIN (GLUCOPHAGE) 500 mg tablet Take 1 tablet (500 mg total) by mouth 2 (two) times a day with meals 180 tablet 1   • atorvastatin (LIPITOR) 20 mg tablet Take 1 tablet (20 mg total) by mouth daily 90 tablet 1   • acetaminophen (TYLENOL) 500 mg tablet Take 2 tablets (1,000 mg total) by mouth every 8 (eight) hours as needed for pain scale 1-3/10.  0   • cholecalciferol, vitamin D3, (cholecalciferol) 1,000 unit tablet Take 2,000 Units by mouth daily.     • lancets (ACCU-CHEK FASTCLIX) [facility] USE BID TO TEST BLOOD SUGAR LEVELS 204 1   • blood-glucose meter (ACCU-CHEK JOSE MANUEL) misc FPD 1 0     No current facility-administered medications for this visit.        REVIEW OF SYSTEMS:   A 14 point systems review was done.  It was negative with the exception of stable neuropathy involving fingers and mild fatigue    The ECOG performance status today is 0          Objective    PHYSICAL EXAM:   /52   Pulse 82   Temp 37.4 °C (99.4 °F) (Oral)   Resp 16   Wt 54 kg (119 lb)   LMP  (LMP Unknown) Comment: still has ovaries  SpO2 96%   BMI 21.76 kg/m²    Body mass index is 21.76 kg/m².      Physical Exam  HENT:      Head: Normocephalic.      Nose: Nose normal.   Eyes:      Pupils: Pupils are equal, round, and reactive to light.   Cardiovascular:      Rate  and Rhythm: Normal rate.   Pulmonary:      Effort: Pulmonary effort is normal.   Abdominal:      General: Abdomen is flat.   Musculoskeletal: Normal range of motion.   Neurological:      General: No focal deficit present.      Mental Status: She is alert.         DIAGNOSTIC REPORTS REVIEWED:      Appointment on 11/12/2020   Component Date Value Ref Range Status   • WBC 11/12/2020 4.1* 4.5 - 10.5 10*3/uL Final   • RBC 11/12/2020 4.81  3.70 - 5.30 10*6/µL Final   • Hemoglobin 11/12/2020 14.7  11.5 - 15.5 g/dL Final   • Hematocrit 11/12/2020 44.0  34.0 - 45.0 % Final   • MCV 11/12/2020 91.5  81.0 - 97.0 fL Final   • MCH 11/12/2020 30.6  28.0 - 33.0 pg Final   • MCHC 11/12/2020 33.4  32.0 - 36.0 g/dL Final   • RDW 11/12/2020 14.3* 11.5 - 14.0 % Final   • Platelets 11/12/2020 155  140 - 350 10*3/uL Final   • MPV 11/12/2020 7.2  6.9 - 10.8 fL Final   • Neutrophils% 11/12/2020 47  41 - 81 % Final   • Lymphocytes% 11/12/2020 37  11 - 47 % Final   • Monocytes% 11/12/2020 15* 3 - 11 % Final   • Eosinophils% 11/12/2020 1  0 - 3 % Final   • Basophils% 11/12/2020 1  0 - 2 % Final   • Neutrophils Absolute 11/12/2020 2.00  10*3/uL Final   • Lymphocytes Absolute 11/12/2020 1.50  10*3/uL Final   • Monocytes Absolute 11/12/2020 0.60  10*3/uL Final   • Eosinophils Absolute 11/12/2020 0.00  10*3/uL Final   • Basophils Absolute 11/12/2020 0.00  10*3/uL Final   • Sodium 11/12/2020 140  135 - 145 mmol/L Final   • Potassium 11/12/2020 4.3  3.5 - 5.1 mmol/L Final   • Chloride 11/12/2020 106  98 - 107 mmol/L Final   • CO2 11/12/2020 22  21 - 32 mmol/L Final   • Anion Gap 11/12/2020 12* 3 - 11 mmol/L Final   • BUN 11/12/2020 17  7 - 25 mg/dL Final   • Creatinine 11/12/2020 0.71  0.60 - 1.10 mg/dL Final   • Glucose 11/12/2020 103  70 - 105 mg/dL Final   • Calcium 11/12/2020 8.8  8.6 - 10.3 mg/dL Final   • AST 11/12/2020 25  <40 U/L Final   • ALT (SGPT) 11/12/2020 22  7 - 52 U/L Final   • Alkaline Phosphatase 11/12/2020 75  55 - 142 U/L Final    • Total Protein 11/12/2020 6.3  6.0 - 8.3 g/dL Final   • Albumin 11/12/2020 4.4  3.5 - 5.3 g/dL Final   • Total Bilirubin 11/12/2020 0.31  0.20 - 1.40 mg/dL Final   • eGFR 11/12/2020 84  >60 mL/min/1.73m*2 Final   • Corrected Calcium 11/12/2020 8.5* 8.6 - 10.3 mg/dL Final   • CEA 11/12/2020 1.6  0.0 - 9.9 ng/mL Final              Assessment/Plan    IMPRESSION and PLAN:   This is a 73-year-old female with colon cancer.   Stage III (T3,N1,M0) s/p resection 10/2018  followed by 6 months of adjuvant FOLFOX (oxaliplatin dropped after cycle 9 due to neuropathy)   CT 11/2020 without recurrence.    Plan:   -  6 month follow up with scans and labs and CEA till 5 years ( till 10/2023)  - colonoscopy done 10/24/19 next due in one year 10/2020 however the patient wants to do it next spring, counseled her about the importance of colonoscopy   - port removed  - discussed increasing fiber intake. For constipation        Physician: Leyla Olvera MD

## 2021-02-11 DIAGNOSIS — I10 ESSENTIAL HYPERTENSION: ICD-10-CM

## 2021-02-11 RX ORDER — METFORMIN HYDROCHLORIDE 500 MG/1
TABLET ORAL
Qty: 180 TABLET | Refills: 0 | Status: SHIPPED | OUTPATIENT
Start: 2021-02-11 | End: 2021-05-13 | Stop reason: SDUPTHER

## 2021-02-11 RX ORDER — METFORMIN HYDROCHLORIDE 500 MG/1
TABLET ORAL
Qty: 180 TABLET | Refills: 1 | OUTPATIENT
Start: 2021-02-11

## 2021-02-11 RX ORDER — LOSARTAN POTASSIUM 50 MG/1
TABLET ORAL
Qty: 90 TABLET | Refills: 1 | OUTPATIENT
Start: 2021-02-11

## 2021-02-11 RX ORDER — LOSARTAN POTASSIUM 50 MG/1
TABLET ORAL
Qty: 90 TABLET | Refills: 0 | Status: SHIPPED | OUTPATIENT
Start: 2021-02-11 | End: 2021-05-13 | Stop reason: SDUPTHER

## 2021-04-12 ENCOUNTER — APPOINTMENT (RX ONLY)
Dept: URBAN - METROPOLITAN AREA CLINIC 61 | Facility: CLINIC | Age: 76
Setting detail: DERMATOLOGY
End: 2021-04-12

## 2021-04-12 ENCOUNTER — TELEPHONE (OUTPATIENT)
Dept: FAMILY MEDICINE | Facility: CLINIC | Age: 76
End: 2021-04-12

## 2021-04-12 VITALS — HEIGHT: 55 IN

## 2021-04-12 DIAGNOSIS — L81.4 OTHER MELANIN HYPERPIGMENTATION: ICD-10-CM

## 2021-04-12 DIAGNOSIS — L82.1 OTHER SEBORRHEIC KERATOSIS: ICD-10-CM

## 2021-04-12 DIAGNOSIS — L81.4 OTHER MELANIN HYPERPIGMENTATION: ICD-10-CM | Status: STABLE

## 2021-04-12 PROCEDURE — ? LIQUID NITROGEN

## 2021-04-12 PROCEDURE — ? COUNSELING

## 2021-04-12 PROCEDURE — 99213 OFFICE O/P EST LOW 20 MIN: CPT | Mod: 25

## 2021-04-12 PROCEDURE — ? PRESCRIPTION

## 2021-04-12 PROCEDURE — 17110 DESTRUCTION B9 LES UP TO 14: CPT

## 2021-04-12 RX ORDER — HYDROQUINONE 4 %
CREAM (GRAM) TOPICAL
Qty: 1 | Refills: 0 | Status: ERX | COMMUNITY
Start: 2021-04-12

## 2021-04-12 RX ADMIN — Medication: at 00:00

## 2021-04-12 ASSESSMENT — LOCATION ZONE DERM
LOCATION ZONE: FACE
LOCATION ZONE: EYELID
LOCATION ZONE: NOSE
LOCATION ZONE: NOSE
LOCATION ZONE: FACE

## 2021-04-12 ASSESSMENT — LOCATION SIMPLE DESCRIPTION DERM
LOCATION SIMPLE: RIGHT CHEEK
LOCATION SIMPLE: RIGHT EYELID
LOCATION SIMPLE: NOSE
LOCATION SIMPLE: RIGHT CHEEK
LOCATION SIMPLE: NOSE

## 2021-04-12 ASSESSMENT — LOCATION DETAILED DESCRIPTION DERM
LOCATION DETAILED: NASAL DORSUM
LOCATION DETAILED: RIGHT SUPERIOR LATERAL BUCCAL CHEEK
LOCATION DETAILED: RIGHT INFERIOR CENTRAL MALAR CHEEK
LOCATION DETAILED: RIGHT SUPERIOR CENTRAL MALAR CHEEK
LOCATION DETAILED: RIGHT INFERIOR LATERAL MALAR CHEEK
LOCATION DETAILED: RIGHT LATERAL MANDIBULAR CHEEK
LOCATION DETAILED: RIGHT LATERAL CANTHUS
LOCATION DETAILED: NASAL DORSUM

## 2021-04-12 NOTE — PROCEDURE: LIQUID NITROGEN
Include Z78.9 (Other Specified Conditions Influencing Health Status) As An Associated Diagnosis?: No
Consent: The patient's consent was obtained including but not limited to risks of crusting, scabbing, blistering, scarring, darker or lighter pigmentary change, recurrence, incomplete removal and infection.
Post-Care Instructions: I reviewed with the patient in detail post-care instructions. Patient is to wear sunprotection, and avoid picking at any of the treated lesions. Pt may apply Vaseline to crusted or scabbing areas.
Medical Necessity Clause: This procedure was medically necessary because the lesions that were treated were:
Total Number Of Lesions Treated: 4
Duration Of Freeze Thaw-Cycle (Seconds): 5-10
Number Of Freeze-Thaw Cycles: 3 freeze-thaw cycles
Medical Necessity Information: It is in your best interest to select a reason for this procedure from the list below. All of these items fulfill various CMS LCD requirements except the new and changing color options.
Detail Level: Zone
Bill Insurance (You Assume Risk Of Denial Or Audit By Selecting Yes): Yes

## 2021-04-12 NOTE — TELEPHONE ENCOUNTER
Patient will be getting labs done at [facility] on 5/12 and patient is wanting to make sure Christina places orders for A1C and whatever else Christina may want checked. Has appt with Christina on 5/13. Please review and place orders, call patient if any questions.

## 2021-05-12 ENCOUNTER — HOSPITAL ENCOUNTER (OUTPATIENT)
Dept: CT IMAGING | Facility: HOSPITAL | Age: 76
Discharge: HOME | End: 2021-05-12
Payer: MEDICARE

## 2021-05-12 ENCOUNTER — OFFICE VISIT (OUTPATIENT)
Dept: ONCOLOGY | Facility: CLINIC | Age: 76
End: 2021-05-12
Payer: MEDICARE

## 2021-05-12 ENCOUNTER — APPOINTMENT (OUTPATIENT)
Dept: ONCOLOGY | Facility: CLINIC | Age: 76
End: 2021-05-12
Payer: MEDICARE

## 2021-05-12 VITALS
DIASTOLIC BLOOD PRESSURE: 80 MMHG | OXYGEN SATURATION: 95 % | WEIGHT: 122.6 LBS | BODY MASS INDEX: 22.42 KG/M2 | SYSTOLIC BLOOD PRESSURE: 171 MMHG | TEMPERATURE: 98.1 F | HEART RATE: 90 BPM

## 2021-05-12 DIAGNOSIS — C18.2 MALIGNANT NEOPLASM OF ASCENDING COLON (CMS/HCC): ICD-10-CM

## 2021-05-12 LAB
ALBUMIN SERPL-MCNC: 4.7 G/DL (ref 3.5–5.3)
ALP SERPL-CCNC: 75 U/L (ref 55–142)
ALT SERPL-CCNC: 20 U/L (ref 7–52)
ANION GAP SERPL CALC-SCNC: 9 MMOL/L (ref 3–11)
AST SERPL-CCNC: 22 U/L
BASOPHILS # BLD AUTO: 0 10*3/UL
BASOPHILS NFR BLD AUTO: 1 % (ref 0–2)
BILIRUB SERPL-MCNC: 0.8 MG/DL (ref 0.2–1.4)
BUN SERPL-MCNC: 15 MG/DL (ref 7–25)
CALCIUM ALBUM COR SERPL-MCNC: 9.1 MG/DL (ref 8.6–10.3)
CALCIUM SERPL-MCNC: 9.7 MG/DL (ref 8.6–10.3)
CEA SERPL-MCNC: 1.6 NG/ML (ref 0–9.9)
CHLORIDE SERPL-SCNC: 104 MMOL/L (ref 98–107)
CO2 SERPL-SCNC: 27 MMOL/L (ref 21–32)
CREAT SERPL-MCNC: 0.7 MG/DL (ref 0.6–1.1)
EOSINOPHIL # BLD AUTO: 0 10*3/UL
EOSINOPHIL NFR BLD AUTO: 1 % (ref 0–3)
ERYTHROCYTE [DISTWIDTH] IN BLOOD BY AUTOMATED COUNT: 14 % (ref 11.5–14)
GFR SERPL CREATININE-BSD FRML MDRD: 86 ML/MIN/1.73M*2
GLUCOSE SERPL-MCNC: 120 MG/DL (ref 70–105)
HCT VFR BLD AUTO: 43.4 % (ref 34–45)
HGB BLD-MCNC: 14.7 G/DL (ref 11.5–15.5)
LYMPHOCYTES # BLD AUTO: 1.1 10*3/UL
LYMPHOCYTES NFR BLD AUTO: 27 % (ref 11–47)
MCH RBC QN AUTO: 31.3 PG (ref 28–33)
MCHC RBC AUTO-ENTMCNC: 34 G/DL (ref 32–36)
MCV RBC AUTO: 92.2 FL (ref 81–97)
MONOCYTES # BLD AUTO: 0.4 10*3/UL
MONOCYTES NFR BLD AUTO: 9 % (ref 3–11)
NEUTROPHILS # BLD AUTO: 2.6 10*3/UL
NEUTROPHILS NFR BLD AUTO: 62 % (ref 41–81)
PLATELET # BLD AUTO: 197 10*3/UL (ref 140–350)
PMV BLD AUTO: 6.9 FL (ref 6.9–10.8)
POTASSIUM SERPL-SCNC: 4.2 MMOL/L (ref 3.5–5.1)
PROT SERPL-MCNC: 6.7 G/DL (ref 6–8.3)
RBC # BLD AUTO: 4.71 10*6/ΜL (ref 3.7–5.3)
SODIUM SERPL-SCNC: 140 MMOL/L (ref 135–145)
WBC # BLD AUTO: 4.2 10*3/UL (ref 4.5–10.5)

## 2021-05-12 PROCEDURE — 82378 CARCINOEMBRYONIC ANTIGEN: CPT

## 2021-05-12 PROCEDURE — G0463 HOSPITAL OUTPT CLINIC VISIT: HCPCS

## 2021-05-12 PROCEDURE — 99214 OFFICE O/P EST MOD 30 MIN: CPT | Performed by: INTERNAL MEDICINE

## 2021-05-12 PROCEDURE — 80053 COMPREHEN METABOLIC PANEL: CPT

## 2021-05-12 PROCEDURE — 71260 CT THORAX DX C+: CPT | Mod: MG

## 2021-05-12 PROCEDURE — 85025 COMPLETE CBC W/AUTO DIFF WBC: CPT

## 2021-05-12 PROCEDURE — 2550000100 HC RX 255: Performed by: INTERNAL MEDICINE

## 2021-05-12 PROCEDURE — 36415 COLL VENOUS BLD VENIPUNCTURE: CPT

## 2021-05-12 RX ORDER — IOPAMIDOL 755 MG/ML
70 INJECTION, SOLUTION INTRAVASCULAR ONCE
Status: COMPLETED | OUTPATIENT
Start: 2021-05-12 | End: 2021-05-12

## 2021-05-12 RX ADMIN — IOPAMIDOL 70 ML: 755 INJECTION, SOLUTION INTRAVENOUS at 09:45

## 2021-05-12 ASSESSMENT — PAIN SCALES - GENERAL: PAINLEVEL: 0-NO PAIN

## 2021-05-12 NOTE — PROGRESS NOTES
Medical Oncology Follow-Up    Date of Service: 5/12/2021    Subjective   HISTORY OF PRESENT ILLNESS:  This is a 73-year-old female with colon cancer.  The patient returns oncology clinic for follow-up and adjuvant chemotherapy.     The patient was diagnosed with colon cancer in October 2018 when she presented with 2-year history of anemia and more recent history of right lower quadrant abdominal pain.  She initially underwent a colonoscopy which revealed the presence of colon cancer in ascending colon. She underwent resection on October 25, 2018.  Surgical margins were negative, the tumor extended through pericolonic tissues.  There were 19 lymph nodes removed in 1 was metastatic.  Pathologically was a [id number] colon cancer.  After she recovered from surgery, I saw the patient for consultation in November 2018. Because of her high risk for recurrence I recommended adjuvant chemotherapy with FOLFOX over a period of 6 months.  After the first cycle she had neutropenia which required Neupogen and discontinuation of 5-FU injection.  With the second cycle she had a reaction to oxaliplatin requiring steroid premedication the night before and morning of chemotherapy.    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  With this cycle 9, Dr. Morris decided to stop oxaliplatin altogether because of worsening neurotoxicity and other side effects.  The patient did better with 5-FU alone.  Finished adjuvant chemotheapry     5/2019 -5/2021 restaging showed no evidence of recurrrence,.        Today:  Recovered from COVID 11/2021,  However her  is still having trouble breathing   Lost her daughter in a car accident in 8/2020  Fatigue resolved, ECOG 0.   Denies abdominal complaints such as abdominal pain.  Constipation, has been there, resolves with fiber.  ( no red flags).  Weight stable.   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           PAST MEDICAL HISTORY:    Past Medical History:   Diagnosis Date   • Blood disorder     anemic   • Complication of anesthesia    • Diabetes mellitus (CMS/HCC) (HCC)    • Hypertension    • Malignant neoplasm of ascending colon (CMS/HCC) (HCC) 10/19/2018   • PONV (postoperative nausea and vomiting)    • Shortness of breath     low iron        FAMILY HISTORY:   Family History   Problem Relation Age of Onset   • Hypertension Mother    • COPD Mother    • Hypertension Father    • Brain Aneurysm Sister    • Cancer Maternal Grandmother 36        SOCIAL HISTORY:   Social History     Socioeconomic History   • Marital status:      Spouse name: Not on file   • Number of children: Not on file   • Years of education: Not on file   • Highest education level: Not on file   Occupational History   • Not on file   Tobacco Use   • Smoking status: Never Smoker   • Smokeless tobacco: Never Used   Substance and Sexual Activity   • Alcohol use: Yes     Alcohol/week: 7.0 standard drinks     Types: 7 Glasses of wine per week     Comment: WEEKLY   • Drug use: No   • Sexual activity: Not on file   Other Topics Concern   • Not on file   Social History Narrative   • Not on file     Social Determinants of Health     Financial Resource Strain:    • Difficulty of Paying Living Expenses:    Food Insecurity:    • Worried About Running Out of Food in the Last Year:    • Ran Out of Food in the Last Year:    Transportation Needs:    • Lack of Transportation (Medical):    • Lack of Transportation (Non-Medical):    Physical Activity:    • Days of Exercise per Week:    • Minutes of Exercise per Session:    Stress:    • Feeling of Stress :    Social Connections:    • Frequency of Communication with Friends and Family:    • Frequency of Social Gatherings with Friends and Family:    • Attends Sikhism Services:    • Active Member of Clubs or Organizations:    • Attends Club or Organization Meetings:    • Marital Status:    Intimate Partner Violence:    • Fear of Current or  Ex-Partner:    • Emotionally Abused:    • Physically Abused:    • Sexually Abused:         ALLERGIES:   Allergies as of 05/12/2021   • (No Known Allergies)        MEDICATIONS:   Current Outpatient Medications   Medication Sig Dispense Refill   • metFORMIN (GLUCOPHAGE) 500 mg tablet TAKE 1 TABLET(500 MG) BY MOUTH TWICE DAILY WITH MEALS 180 tablet 0   • losartan (COZAAR) 50 mg tablet TAKE 1 TABLET BY MOUTH EVERY DAY 90 tablet 0   • atorvastatin (LIPITOR) 20 mg tablet Take 1 tablet (20 mg total) by mouth daily 90 tablet 1   • acetaminophen (TYLENOL) 500 mg tablet Take 2 tablets (1,000 mg total) by mouth every 8 (eight) hours as needed for pain scale 1-3/10.  0   • cholecalciferol, vitamin D3, (cholecalciferol) 1,000 unit tablet Take 2,000 Units by mouth daily.     • lancets (ACCU-CHEK FASTCLIX) [facility] [place] USE BID TO TEST BLOOD SUGAR LEVELS 204 1   • blood-glucose meter (ACCU-CHEK JOSE MANUEL) misc FPD 1 0     No current facility-administered medications for this visit.       REVIEW OF SYSTEMS:   A 14 point systems review was done.  It was negative with the exception of stable neuropathy involving fingers and mild fatigue    The ECOG performance status today is 0          Objective    PHYSICAL EXAM:   /80   Pulse 90   Temp 36.7 °C (98.1 °F) (Temporal)   Wt 55.6 kg (122 lb 9.6 oz)   LMP  (LMP Unknown) Comment: still has ovaries  SpO2 95%   BMI 22.42 kg/m²    Body mass index is 22.42 kg/m².      Physical Exam  HENT:      Head: Normocephalic.      Nose: Nose normal.   Eyes:      Pupils: Pupils are equal, round, and reactive to light.   Cardiovascular:      Rate and Rhythm: Normal rate.   Pulmonary:      Effort: Pulmonary effort is normal.   Abdominal:      General: Abdomen is flat.   Musculoskeletal:         General: Normal range of motion.   Neurological:      General: No focal deficit present.      Mental Status: She is alert.         DIAGNOSTIC REPORTS REVIEWED:      Appointment on 05/12/2021   Component Date Value Ref  Range Status   • WBC 05/12/2021 4.2* 4.5 - 10.5 10*3/uL Final   • RBC 05/12/2021 4.71  3.70 - 5.30 10*6/µL Final   • Hemoglobin 05/12/2021 14.7  11.5 - 15.5 g/dL Final   • Hematocrit 05/12/2021 43.4  34.0 - 45.0 % Final   • MCV 05/12/2021 92.2  81.0 - 97.0 fL Final   • MCH 05/12/2021 31.3  28.0 - 33.0 pg Final   • MCHC 05/12/2021 34.0  32.0 - 36.0 g/dL Final   • RDW 05/12/2021 14.0  11.5 - 14.0 % Final   • Platelets 05/12/2021 197  140 - 350 10*3/uL Final   • MPV 05/12/2021 6.9  6.9 - 10.8 fL Final   • Neutrophils% 05/12/2021 62  41 - 81 % Final   • Lymphocytes% 05/12/2021 27  11 - 47 % Final   • Monocytes% 05/12/2021 9  3 - 11 % Final   • Eosinophils% 05/12/2021 1  0 - 3 % Final   • Basophils% 05/12/2021 1  0 - 2 % Final   • Neutrophils Absolute 05/12/2021 2.60  10*3/uL Final   • Lymphocytes Absolute 05/12/2021 1.10  10*3/uL Final   • Monocytes Absolute 05/12/2021 0.40  10*3/uL Final   • Eosinophils Absolute 05/12/2021 0.00  10*3/uL Final   • Basophils Absolute 05/12/2021 0.00  10*3/uL Final   • Sodium 05/12/2021 140  135 - 145 mmol/L Final   • Potassium 05/12/2021 4.2  3.5 - 5.1 mmol/L Final   • Chloride 05/12/2021 104  98 - 107 mmol/L Final   • CO2 05/12/2021 27  21 - 32 mmol/L Final   • Anion Gap 05/12/2021 9  3 - 11 mmol/L Final   • BUN 05/12/2021 15  7 - 25 mg/dL Final   • Creatinine 05/12/2021 0.70  0.60 - 1.10 mg/dL Final   • Glucose 05/12/2021 120* 70 - 105 mg/dL Final   • Calcium 05/12/2021 9.7  8.6 - 10.3 mg/dL Final   • AST 05/12/2021 22  <40 U/L Final   • ALT (SGPT) 05/12/2021 20  7 - 52 U/L Final   • Alkaline Phosphatase 05/12/2021 75  55 - 142 U/L Final   • Total Protein 05/12/2021 6.7  6.0 - 8.3 g/dL Final   • Albumin 05/12/2021 4.7  3.5 - 5.3 g/dL Final   • Total Bilirubin 05/12/2021 0.80  0.20 - 1.40 mg/dL Final   • eGFR 05/12/2021 86  >60 mL/min/1.73m*2 Final   • Corrected Calcium 05/12/2021 9.1  8.6 - 10.3 mg/dL Final   • CEA 05/12/2021 1.6  0.0 - 9.9 ng/mL Final              Assessment/Plan     IMPRESSION and PLAN:   This is a 73-year-old female with colon cancer.   Stage III (T3,N1,M0) s/p resection 10/2018  followed by 6 months of adjuvant FOLFOX (oxaliplatin dropped after cycle 9 due to neuropathy)   CT 11/2020 without recurrence.    Plan:   -  6 month follow up with scans and labs and CEA till 5 years ( till 10/2023)  - colonoscopy done 10/24/19 next due in one year 10/2020 however the patient delyed it till now , counseled her about the importance of colonoscopy   - port removed  - discussed increasing fiber intake. For constipation        Physician: Leyla Olvera MD

## 2021-05-13 ENCOUNTER — OFFICE VISIT (OUTPATIENT)
Dept: FAMILY MEDICINE | Facility: CLINIC | Age: 76
End: 2021-05-13
Payer: MEDICARE

## 2021-05-13 VITALS
WEIGHT: 120 LBS | HEART RATE: 69 BPM | RESPIRATION RATE: 16 BRPM | BODY MASS INDEX: 21.94 KG/M2 | OXYGEN SATURATION: 98 % | DIASTOLIC BLOOD PRESSURE: 80 MMHG | SYSTOLIC BLOOD PRESSURE: 128 MMHG | TEMPERATURE: 97.5 F

## 2021-05-13 DIAGNOSIS — I10 ESSENTIAL HYPERTENSION: ICD-10-CM

## 2021-05-13 DIAGNOSIS — E78.5 HYPERLIPIDEMIA, UNSPECIFIED HYPERLIPIDEMIA TYPE: ICD-10-CM

## 2021-05-13 DIAGNOSIS — E11.9 TYPE 2 DIABETES MELLITUS WITHOUT COMPLICATION, WITHOUT LONG-TERM CURRENT USE OF INSULIN (CMS/HCC): ICD-10-CM

## 2021-05-13 DIAGNOSIS — C18.2 MALIGNANT NEOPLASM OF ASCENDING COLON (CMS/HCC): Primary | ICD-10-CM

## 2021-05-13 LAB
CHOLEST SERPL-MCNC: 144 MG/DL (ref 0–199)
EST. AVERAGE GLUCOSE BLD GHB EST-MCNC: 105.4 MG/DL
FASTING STATUS PATIENT QL REPORTED: NO
HBA1C MFR BLD: 5.3 % (ref 4–6)
HDLC SERPL-MCNC: 81 MG/DL
LDLC SERPL CALC-MCNC: 40 MG/DL (ref 20–99)
TRIGL SERPL-MCNC: 117 MG/DL

## 2021-05-13 PROCEDURE — G0463 HOSPITAL OUTPT CLINIC VISIT: HCPCS | Performed by: NURSE PRACTITIONER

## 2021-05-13 PROCEDURE — 83036 HEMOGLOBIN GLYCOSYLATED A1C: CPT | Performed by: NURSE PRACTITIONER

## 2021-05-13 PROCEDURE — 36415 COLL VENOUS BLD VENIPUNCTURE: CPT | Performed by: NURSE PRACTITIONER

## 2021-05-13 PROCEDURE — 80061 LIPID PANEL: CPT | Performed by: NURSE PRACTITIONER

## 2021-05-13 PROCEDURE — 99213 OFFICE O/P EST LOW 20 MIN: CPT | Performed by: NURSE PRACTITIONER

## 2021-05-13 RX ORDER — ATORVASTATIN CALCIUM 20 MG/1
20 TABLET, FILM COATED ORAL DAILY
Qty: 90 TABLET | Refills: 1 | Status: SHIPPED | OUTPATIENT
Start: 2021-05-13

## 2021-05-13 RX ORDER — LOSARTAN POTASSIUM 50 MG/1
50 TABLET ORAL DAILY
Qty: 90 TABLET | Refills: 1 | Status: SHIPPED | OUTPATIENT
Start: 2021-05-13

## 2021-05-13 RX ORDER — ASCORBIC ACID 500 MG
500 TABLET ORAL DAILY
COMMUNITY

## 2021-05-13 RX ORDER — METFORMIN HYDROCHLORIDE 500 MG/1
500 TABLET ORAL 2 TIMES DAILY WITH MEALS
Qty: 180 TABLET | Refills: 1 | Status: SHIPPED | OUTPATIENT
Start: 2021-05-13

## 2021-05-13 ASSESSMENT — ENCOUNTER SYMPTOMS
SLEEP DISTURBANCE: 0
FATIGUE: 0
SORE THROAT: 0
PALPITATIONS: 0
DIZZINESS: 0
ADENOPATHY: 0
DECREASED CONCENTRATION: 0
NERVOUS/ANXIOUS: 0
BRUISES/BLEEDS EASILY: 0
HEMATURIA: 0
CONSTIPATION: 0
FEVER: 0
ARTHRALGIAS: 0
SHORTNESS OF BREATH: 0
COUGH: 0
ACTIVITY CHANGE: 0
DYSURIA: 0
UNEXPECTED WEIGHT CHANGE: 0
DIARRHEA: 0

## 2021-05-13 ASSESSMENT — PAIN SCALES - GENERAL: PAINLEVEL: 0-NO PAIN

## 2021-05-13 NOTE — PROGRESS NOTES
Shama Caballero          [id number]      CHIEF COMPLAINT/REASON FOR VISIT  Shama Caballero is a 75 y.o. female who presents for:  Chief Complaint   Patient presents with   • Med Refill       HISTORY OF PRESENT ILLNESS:  HPI  They moved to [place], [place].  Bought a house there.  They will plan to be seen in [place].  She is doing well.  She had COVID.  Has not had vaccine yet.  Some stress.   can't breath after COVID.  Saw Dr. Mcadams yesterday.  Will see him again in 6 months and will have CT in one year.  Labs back but they did not run a lipid panel or A1C.      Patient Care Team:  Christina Singh CNP as PCP - General (Family Medicine)     The following portions of the patient's history were reviewed and updated as appropriate: allergies, current medications, family history, medical history, social history, surgical history and problem list.     MEDICATIONS:     Current Outpatient Medications:   •  ascorbic acid, vitamin C, (ascorbic acid with minerva hips) 500 mg tablet, Take 500 mg by mouth daily, Disp: , Rfl:   •  losartan (COZAAR) 50 mg tablet, Take 1 tablet (50 mg total) by mouth daily, Disp: 90 tablet, Rfl: 1  •  metFORMIN (GLUCOPHAGE) 500 mg tablet, Take 1 tablet (500 mg total) by mouth 2 (two) times a day with meals, Disp: 180 tablet, Rfl: 1  •  atorvastatin (LIPITOR) 20 mg tablet, Take 1 tablet (20 mg total) by mouth daily, Disp: 90 tablet, Rfl: 1  •  acetaminophen (TYLENOL) 500 mg tablet, Take 2 tablets (1,000 mg total) by mouth every 8 (eight) hours as needed for pain scale 1-3/10., Disp: , Rfl: 0  •  cholecalciferol, vitamin D3, (cholecalciferol) 1,000 unit tablet, Take 2,000 Units by mouth daily., Disp: , Rfl:   •  lancets (ACCU-CHEK FASTCLIX[place] [facility] [place] BID TO TEST BLOOD SUGAR LEVELS, Disp: 204, Rfl: 1  •  blood-glucose meter (ACCU-CHEK JOSE MANUEL) misc, FPD, Disp: 1, Rfl: 0    REVIEW OF SYSTEMS  Review of Systems   Constitutional: Negative for activity change, fatigue, fever and unexpected weight change.    HENT: Negative for congestion and sore throat.    Respiratory: Negative for cough and shortness of breath.    Cardiovascular: Negative for chest pain, palpitations and leg swelling.   Gastrointestinal: Negative for constipation and diarrhea.   Genitourinary: Negative for dysuria, hematuria and urgency.   Musculoskeletal: Negative for arthralgias and gait problem.   Neurological: Negative for dizziness.   Hematological: Negative for adenopathy. Does not bruise/bleed easily.   Psychiatric/Behavioral: Negative for decreased concentration and sleep disturbance. The patient is not nervous/anxious.        VITALS  Vitals:    05/13/21 0829   BP: 128/80   Pulse: 69   Resp: 16   Temp: 36.4 °C (97.5 °F)   SpO2: 98%     Body mass index is 21.94 kg/m².    Wt Readings from Last 3 Encounters:   05/13/21 54.4 kg (120 lb)   05/12/21 55.6 kg (122 lb 9.6 oz)   11/12/20 54 kg (119 lb)     Temp Readings from Last 3 Encounters:   05/13/21 36.4 °C (97.5 °F) (Temporal)   05/12/21 36.7 °C (98.1 °F) (Temporal)   11/12/20 37.4 °C (99.4 °F) (Oral)     BP Readings from Last 3 Encounters:   05/13/21 128/80   05/12/21 171/80   11/12/20 110/52     Pulse Readings from Last 3 Encounters:   05/13/21 69   05/12/21 90   11/12/20 82       PHYSICAL EXAMINATION:  Physical Exam  Vitals and nursing note reviewed.   Constitutional:       General: She is not in acute distress.  HENT:      Mouth/Throat:      Mouth: Mucous membranes are moist.      Pharynx: Oropharynx is clear.   Eyes:      Conjunctiva/sclera: Conjunctivae normal.   Cardiovascular:      Rate and Rhythm: Normal rate and regular rhythm.      Pulses: Normal pulses.   Pulmonary:      Effort: Pulmonary effort is normal.      Breath sounds: Normal breath sounds.   Abdominal:      Palpations: Abdomen is soft.   Musculoskeletal:      Cervical back: No tenderness.      Right lower leg: No edema.      Left lower leg: No edema.   Lymphadenopathy:      Cervical: No cervical adenopathy.   Skin:      General: Skin is warm and dry.   Neurological:      Mental Status: She is alert and oriented to person, place, and time.   Psychiatric:         Mood and Affect: Mood normal.         Behavior: Behavior normal.         Thought Content: Thought content normal.         Judgment: Judgment normal.         Assessment/Plan   Diagnoses and all orders for this visit:    Malignant neoplasm of ascending colon (CMS/[facility]) ([facility])    Essential hypertension  -     losartan (COZAAR) 50 mg tablet; Take 1 tablet (50 mg total) by mouth daily  -     metFORMIN (GLUCOPHAGE) 500 mg tablet; Take 1 tablet (500 mg total) by mouth 2 (two) times a day with meals    Hyperlipidemia, unspecified hyperlipidemia type  -     atorvastatin (LIPITOR) 20 mg tablet; Take 1 tablet (20 mg total) by mouth daily    Type 2 diabetes mellitus without complication, without long-term current use of insulin (CMS/[facility]) ([facility])  -     Hemoglobin A1c (glycosylated) Blood, Venous      Will plan to establish in [place], [place].  Meds refilled for 6 months.  If we can have A1C and lipid panel done on lab already drawn, we will f/u with results.    MICHAEL BOYD, CNP

## 2021-11-03 ENCOUNTER — APPOINTMENT (OUTPATIENT)
Dept: ONCOLOGY | Facility: CLINIC | Age: 76
End: 2021-11-03
Payer: MEDICARE

## 2021-11-03 ENCOUNTER — OFFICE VISIT (OUTPATIENT)
Dept: ONCOLOGY | Facility: CLINIC | Age: 76
End: 2021-11-03
Payer: MEDICARE

## 2021-11-03 VITALS
DIASTOLIC BLOOD PRESSURE: 60 MMHG | TEMPERATURE: 97.6 F | WEIGHT: 122.8 LBS | OXYGEN SATURATION: 97 % | SYSTOLIC BLOOD PRESSURE: 150 MMHG | BODY MASS INDEX: 22.45 KG/M2 | HEART RATE: 107 BPM

## 2021-11-03 DIAGNOSIS — C18.2 MALIGNANT NEOPLASM OF ASCENDING COLON (CMS/HCC): ICD-10-CM

## 2021-11-03 DIAGNOSIS — E08.36: ICD-10-CM

## 2021-11-03 LAB
ALBUMIN SERPL-MCNC: 4.7 G/DL (ref 3.5–5.3)
ALP SERPL-CCNC: 82 U/L (ref 55–142)
ALT SERPL-CCNC: 23 U/L (ref 7–52)
ANION GAP SERPL CALC-SCNC: 9 MMOL/L (ref 3–11)
AST SERPL-CCNC: 28 U/L
BASOPHILS # BLD AUTO: 0 10*3/UL
BASOPHILS NFR BLD AUTO: 0 % (ref 0–2)
BILIRUB SERPL-MCNC: 0.65 MG/DL (ref 0.2–1.4)
BUN SERPL-MCNC: 12 MG/DL (ref 7–25)
CALCIUM ALBUM COR SERPL-MCNC: 9 MG/DL (ref 8.6–10.3)
CALCIUM SERPL-MCNC: 9.6 MG/DL (ref 8.6–10.3)
CEA SERPL-MCNC: 1.5 NG/ML (ref 0–9.9)
CHLORIDE SERPL-SCNC: 105 MMOL/L (ref 98–107)
CO2 SERPL-SCNC: 27 MMOL/L (ref 21–32)
CREAT SERPL-MCNC: 0.74 MG/DL (ref 0.6–1.1)
EOSINOPHIL # BLD AUTO: 0.1 10*3/UL
EOSINOPHIL NFR BLD AUTO: 1 % (ref 0–3)
ERYTHROCYTE [DISTWIDTH] IN BLOOD BY AUTOMATED COUNT: 13.8 % (ref 11.5–14)
GFR SERPL CREATININE-BSD FRML MDRD: 80 ML/MIN/1.73M*2
GLUCOSE SERPL-MCNC: 107 MG/DL (ref 70–105)
HCT VFR BLD AUTO: 46.1 % (ref 34–45)
HGB BLD-MCNC: 15.4 G/DL (ref 11.5–15.5)
LYMPHOCYTES # BLD AUTO: 1.5 10*3/UL
LYMPHOCYTES NFR BLD AUTO: 31 % (ref 11–47)
MCH RBC QN AUTO: 30.8 PG (ref 28–33)
MCHC RBC AUTO-ENTMCNC: 33.4 G/DL (ref 32–36)
MCV RBC AUTO: 92.2 FL (ref 81–97)
MONOCYTES # BLD AUTO: 0.4 10*3/UL
MONOCYTES NFR BLD AUTO: 8 % (ref 3–11)
NEUTROPHILS # BLD AUTO: 3 10*3/UL
NEUTROPHILS NFR BLD AUTO: 60 % (ref 41–81)
PLATELET # BLD AUTO: 212 10*3/UL (ref 140–350)
PMV BLD AUTO: 7.4 FL (ref 6.9–10.8)
POTASSIUM SERPL-SCNC: 4 MMOL/L (ref 3.5–5.1)
PROT SERPL-MCNC: 6.6 G/DL (ref 6–8.3)
RBC # BLD AUTO: 5 10*6/ΜL (ref 3.7–5.3)
SODIUM SERPL-SCNC: 141 MMOL/L (ref 135–145)
WBC # BLD AUTO: 5 10*3/UL (ref 4.5–10.5)

## 2021-11-03 PROCEDURE — 85025 COMPLETE CBC W/AUTO DIFF WBC: CPT

## 2021-11-03 PROCEDURE — 82378 CARCINOEMBRYONIC ANTIGEN: CPT

## 2021-11-03 PROCEDURE — 99214 OFFICE O/P EST MOD 30 MIN: CPT | Performed by: INTERNAL MEDICINE

## 2021-11-03 PROCEDURE — 80053 COMPREHEN METABOLIC PANEL: CPT

## 2021-11-03 PROCEDURE — G0463 HOSPITAL OUTPT CLINIC VISIT: HCPCS

## 2021-11-03 PROCEDURE — 36415 COLL VENOUS BLD VENIPUNCTURE: CPT

## 2021-11-03 ASSESSMENT — PAIN SCALES - GENERAL: PAINLEVEL: 0-NO PAIN

## 2021-11-03 NOTE — PROGRESS NOTES
Medical Oncology Follow-Up    Date of Service: 11/3/2021    Subjective   HISTORY OF PRESENT ILLNESS:  This is a 73-year-old female with colon cancer.  The patient returns oncology clinic for follow-up and adjuvant chemotherapy.     The patient was diagnosed with colon cancer in October 2018 when she presented with 2-year history of anemia and more recent history of right lower quadrant abdominal pain.  She initially underwent a colonoscopy which revealed the presence of colon cancer in ascending colon. She underwent resection on October 25, 2018.  Surgical margins were negative, the tumor extended through pericolonic tissues.  There were 19 lymph nodes removed in 1 was metastatic.  Pathologically was a [id number] colon cancer.  After she recovered from surgery, I saw the patient for consultation in November 2018. Because of her high risk for recurrence I recommended adjuvant chemotherapy with FOLFOX over a period of 6 months.  After the first cycle she had neutropenia which required Neupogen and discontinuation of 5-FU injection.  With the second cycle she had a reaction to oxaliplatin requiring steroid premedication the night before and morning of chemotherapy.    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  With this cycle 9, Dr. Morris decided to stop oxaliplatin altogether because of worsening neurotoxicity and other side effects.  The patient did better with 5-FU alone.  Finished adjuvant chemotheapry     5/2019 -11/2021restaging showed no evidence of recurrrence,.        Today:  Recovered from COVID 11/2020,  However her  is still having trouble breathing   Lost her daughter in a car accident in 8/2020  Fatigue resolved, ECOG 0.   Denies abdominal complaints such as abdominal pain.  Constipation, has been there, resolves with fiber.  ( no red flags).  Weight stable.   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    PAST MEDICAL HISTORY:   Past  Medical History:   Diagnosis Date   • Blood disorder     anemic   • Complication of anesthesia    • Diabetes mellitus (CMS/HCC) (HCC)    • Hypertension    • Malignant neoplasm of ascending colon (CMS/HCC) (HCC) 10/19/2018   • PONV (postoperative nausea and vomiting)    • Shortness of breath     low iron        FAMILY HISTORY:   Family History   Problem Relation Age of Onset   • Hypertension Mother    • COPD Mother    • Hypertension Father    • Brain Aneurysm Sister    • Cancer Maternal Grandmother 36        SOCIAL HISTORY:   Social History     Socioeconomic History   • Marital status:      Spouse name: Not on file   • Number of children: Not on file   • Years of education: Not on file   • Highest education level: Not on file   Occupational History   • Not on file   Tobacco Use   • Smoking status: Never Smoker   • Smokeless tobacco: Never Used   Substance and Sexual Activity   • Alcohol use: Yes     Alcohol/week: 7.0 standard drinks     Types: 7 Glasses of wine per week     Comment: WEEKLY   • Drug use: No   • Sexual activity: Not on file   Other Topics Concern   • Not on file   Social History Narrative   • Not on file     Social Determinants of Health     Financial Resource Strain: Not on file   Food Insecurity: Not on file   Transportation Needs: Not on file   Physical Activity: Not on file   Stress: Not on file   Social Connections: Not on file   Intimate Partner Violence: Not on file        ALLERGIES:   Allergies as of 11/03/2021   • (No Known Allergies)        MEDICATIONS:   Current Outpatient Medications   Medication Sig Dispense Refill   • ascorbic acid, vitamin C, (ascorbic acid with minerva hips) 500 mg tablet Take 500 mg by mouth daily     • losartan (COZAAR) 50 mg tablet Take 1 tablet (50 mg total) by mouth daily 90 tablet 1   • metFORMIN (GLUCOPHAGE) 500 mg tablet Take 1 tablet (500 mg total) by mouth 2 (two) times a day with meals 180 tablet 1   • atorvastatin (LIPITOR) 20 mg tablet Take 1 tablet (20  mg total) by mouth daily 90 tablet 1   • acetaminophen (TYLENOL) 500 mg tablet Take 2 tablets (1,000 mg total) by mouth every 8 (eight) hours as needed for pain scale 1-3/10.  0   • cholecalciferol, vitamin D3, (cholecalciferol) 1,000 unit tablet Take 2,000 Units by mouth daily.     • lancets (ACCU-CHEK FASTCLIX) [facility] USE BID TO TEST BLOOD SUGAR LEVELS 204 1   • blood-glucose meter (ACCU-CHEK JOSE MANUEL) misc FPD 1 0     No current facility-administered medications for this visit.       REVIEW OF SYSTEMS:   A 14 point systems review was done.  It was negative with the exception of stable neuropathy involving fingers and mild fatigue    The ECOG performance status today is 0          Objective    PHYSICAL EXAM:   /60   Pulse 107   Temp 36.4 °C (97.6 °F) (Temporal)   Wt 55.7 kg (122 lb 12.8 oz)   LMP  (LMP Unknown) Comment: still has ovaries  SpO2 97%   BMI 22.45 kg/m²    Body mass index is 22.45 kg/m².      Physical Exam  HENT:      Head: Normocephalic.      Nose: Nose normal.   Eyes:      Pupils: Pupils are equal, round, and reactive to light.   Cardiovascular:      Rate and Rhythm: Normal rate.   Pulmonary:      Effort: Pulmonary effort is normal.   Abdominal:      General: Abdomen is flat.   Musculoskeletal:         General: Normal range of motion.   Neurological:      General: No focal deficit present.      Mental Status: She is alert.         DIAGNOSTIC REPORTS REVIEWED:      Appointment on 11/03/2021   Component Date Value Ref Range Status   • WBC 11/03/2021 5.0  4.5 - 10.5 10*3/uL Final   • RBC 11/03/2021 5.00  3.70 - 5.30 10*6/µL Final   • Hemoglobin 11/03/2021 15.4  11.5 - 15.5 g/dL Final   • Hematocrit 11/03/2021 46.1* 34.0 - 45.0 % Final   • MCV 11/03/2021 92.2  81.0 - 97.0 fL Final   • MCH 11/03/2021 30.8  28.0 - 33.0 pg Final   • MCHC 11/03/2021 33.4  32.0 - 36.0 g/dL Final   • RDW 11/03/2021 13.8  11.5 - 14.0 % Final   • Platelets 11/03/2021 212  140 - 350 10*3/uL Final   • MPV 11/03/2021 7.4  6.9  - 10.8 fL Final   • Neutrophils% 11/03/2021 60  41 - 81 % Final   • Lymphocytes% 11/03/2021 31  11 - 47 % Final   • Monocytes% 11/03/2021 8  3 - 11 % Final   • Eosinophils% 11/03/2021 1  0 - 3 % Final   • Basophils% 11/03/2021 0  0 - 2 % Final   • ANC (auto diff) 11/03/2021 3.00  10*3/UL Final   • Lymphocytes Absolute 11/03/2021 1.50  10*3/uL Final   • Monocytes Absolute 11/03/2021 0.40  10*3/uL Final   • Eosinophils Absolute 11/03/2021 0.10  10*3/uL Final   • Basophils Absolute 11/03/2021 0.00  10*3/uL Final   • Sodium 11/03/2021 141  135 - 145 mmol/L Final   • Potassium 11/03/2021 4.0  3.5 - 5.1 MMOL/L Final   • Chloride 11/03/2021 105  98 - 107 mmol/L Final   • CO2 11/03/2021 27  21 - 32 mmol/L Final   • Anion Gap 11/03/2021 9  3 - 11 mmol/L Final   • BUN 11/03/2021 12  7 - 25 mg/dL Final   • Creatinine 11/03/2021 0.74  0.60 - 1.10 mg/dL Final   • Glucose 11/03/2021 107* 70 - 105 mg/dL Final   • Calcium 11/03/2021 9.6  8.6 - 10.3 mg/dL Final   • AST 11/03/2021 28  <40 U/L Final   • ALT (SGPT) 11/03/2021 23  7 - 52 U/L Final   • Alkaline Phosphatase 11/03/2021 82  55 - 142 U/L Final   • Total Protein 11/03/2021 6.6  6.0 - 8.3 g/dL Final   • Albumin 11/03/2021 4.7  3.5 - 5.3 g/dL Final   • Total Bilirubin 11/03/2021 0.65  0.20 - 1.40 mg/dL Final   • eGFR 11/03/2021 80  >60 mL/min/1.73m*2 Final   • Corrected Calcium 11/03/2021 9.0  8.6 - 10.3 mg/dL Final   • CEA 11/03/2021 1.5  0.0 - 9.9 ng/mL Final              Assessment/Plan    IMPRESSION and PLAN:   This is a 73-year-old female with colon cancer.   Stage III (T3,N1,M0) s/p resection 10/2018  followed by 6 months of adjuvant FOLFOX (oxaliplatin dropped after cycle 9 due to neuropathy)   CT 11/2020 without recurrence.    Plan:   -  6 month follow up with scans and labs and CEA till 5 years ( till 10/2023)  - colonoscopy done 10/24/19 next due in one year 11/2021 however the patient delyed it till now, counseled her about the importance of colonoscopy.  - port  removed  - discussed increasing fiber intake. For constipation     Physician: Leyla Olvera MD   On this date of service  I spent a total time of 30 minutes for visit and documentation and coordination of care

## 2022-05-27 ENCOUNTER — APPOINTMENT (OUTPATIENT)
Dept: ONCOLOGY | Facility: CLINIC | Age: 77
End: 2022-05-27
Payer: MEDICARE

## 2022-05-27 ENCOUNTER — HOSPITAL ENCOUNTER (OUTPATIENT)
Dept: CT IMAGING | Facility: HOSPITAL | Age: 77
Discharge: HOME | End: 2022-05-27
Payer: MEDICARE

## 2022-05-27 ENCOUNTER — OFFICE VISIT (OUTPATIENT)
Dept: ONCOLOGY | Facility: CLINIC | Age: 77
End: 2022-05-27
Payer: MEDICARE

## 2022-05-27 VITALS
SYSTOLIC BLOOD PRESSURE: 136 MMHG | TEMPERATURE: 97.1 F | DIASTOLIC BLOOD PRESSURE: 60 MMHG | WEIGHT: 125.4 LBS | OXYGEN SATURATION: 95 % | BODY MASS INDEX: 22.93 KG/M2 | HEART RATE: 88 BPM

## 2022-05-27 DIAGNOSIS — C18.2 MALIGNANT NEOPLASM OF ASCENDING COLON (CMS/HCC): ICD-10-CM

## 2022-05-27 DIAGNOSIS — E08.36: ICD-10-CM

## 2022-05-27 LAB
ALBUMIN SERPL-MCNC: 4.5 G/DL (ref 3.5–5.3)
ALP SERPL-CCNC: 75 U/L (ref 55–142)
ALT SERPL-CCNC: 19 U/L (ref 7–52)
ANION GAP SERPL CALC-SCNC: 7 MMOL/L (ref 3–11)
AST SERPL-CCNC: 25 U/L
BASOPHILS # BLD AUTO: 0 10*3/UL
BASOPHILS NFR BLD AUTO: 1 % (ref 0–2)
BILIRUB SERPL-MCNC: 0.7 MG/DL (ref 0.2–1.4)
BUN SERPL-MCNC: 17 MG/DL (ref 7–25)
CALCIUM ALBUM COR SERPL-MCNC: 9.2 MG/DL (ref 8.6–10.3)
CALCIUM SERPL-MCNC: 9.6 MG/DL (ref 8.6–10.3)
CEA SERPL-MCNC: 1.3 NG/ML (ref 0–9.9)
CHLORIDE SERPL-SCNC: 107 MMOL/L (ref 98–107)
CO2 SERPL-SCNC: 28 MMOL/L (ref 21–32)
CREAT SERPL-MCNC: 0.69 MG/DL (ref 0.6–1.1)
EOSINOPHIL # BLD AUTO: 0.1 10*3/UL
EOSINOPHIL NFR BLD AUTO: 2 % (ref 0–3)
ERYTHROCYTE [DISTWIDTH] IN BLOOD BY AUTOMATED COUNT: 13.7 % (ref 11.5–14)
GFR SERPL CREATININE-BSD FRML MDRD: 91 ML/MIN/1.73M*2
GLUCOSE SERPL-MCNC: 111 MG/DL (ref 70–105)
HCT VFR BLD AUTO: 43.3 % (ref 34–45)
HGB BLD-MCNC: 14.7 G/DL (ref 11.5–15.5)
LYMPHOCYTES # BLD AUTO: 1.3 10*3/UL
LYMPHOCYTES NFR BLD AUTO: 30 % (ref 11–47)
MCH RBC QN AUTO: 31.2 PG (ref 28–33)
MCHC RBC AUTO-ENTMCNC: 33.8 G/DL (ref 32–36)
MCV RBC AUTO: 92.1 FL (ref 81–97)
MONOCYTES # BLD AUTO: 0.4 10*3/UL
MONOCYTES NFR BLD AUTO: 11 % (ref 3–11)
NEUTROPHILS # BLD AUTO: 2.4 10*3/UL
NEUTROPHILS NFR BLD AUTO: 57 % (ref 41–81)
PLATELET # BLD AUTO: 190 10*3/UL (ref 140–350)
PMV BLD AUTO: 7.3 FL (ref 6.9–10.8)
POTASSIUM SERPL-SCNC: 4.2 MMOL/L (ref 3.5–5.1)
PROT SERPL-MCNC: 6.7 G/DL (ref 6–8.3)
RBC # BLD AUTO: 4.7 10*6/ΜL (ref 3.7–5.3)
SODIUM SERPL-SCNC: 142 MMOL/L (ref 135–145)
TSH SERPL DL<=0.05 MIU/L-ACNC: 2.6 UIU/ML (ref 0.34–4.82)
WBC # BLD AUTO: 4.2 10*3/UL (ref 4.5–10.5)

## 2022-05-27 PROCEDURE — 99214 OFFICE O/P EST MOD 30 MIN: CPT | Performed by: INTERNAL MEDICINE

## 2022-05-27 PROCEDURE — 85025 COMPLETE CBC W/AUTO DIFF WBC: CPT

## 2022-05-27 PROCEDURE — 82378 CARCINOEMBRYONIC ANTIGEN: CPT

## 2022-05-27 PROCEDURE — 80053 COMPREHEN METABOLIC PANEL: CPT

## 2022-05-27 PROCEDURE — 74177 CT ABD & PELVIS W/CONTRAST: CPT | Mod: ME

## 2022-05-27 PROCEDURE — 36415 COLL VENOUS BLD VENIPUNCTURE: CPT

## 2022-05-27 PROCEDURE — 84443 ASSAY THYROID STIM HORMONE: CPT

## 2022-05-27 PROCEDURE — G0463 HOSPITAL OUTPT CLINIC VISIT: HCPCS

## 2022-05-27 PROCEDURE — 2550000100 HC RX 255

## 2022-05-27 RX ORDER — IOPAMIDOL 755 MG/ML
60 INJECTION, SOLUTION INTRAVASCULAR ONCE
Status: COMPLETED | OUTPATIENT
Start: 2022-05-27 | End: 2022-05-27

## 2022-05-27 RX ADMIN — IOPAMIDOL 60 ML: 755 INJECTION, SOLUTION INTRAVENOUS at 11:30

## 2022-05-27 ASSESSMENT — PAIN SCALES - GENERAL: PAINLEVEL: 0-NO PAIN

## 2022-05-27 NOTE — PROGRESS NOTES
Medical Oncology Follow-Up    Date of Service: 5/27/2022    Subjective   HISTORY OF PRESENT ILLNESS:  This is a 73-year-old female with colon cancer.  The patient returns oncology clinic for follow-up and adjuvant chemotherapy.     The patient was diagnosed with colon cancer in October 2018 when she presented with 2-year history of anemia and more recent history of right lower quadrant abdominal pain.  She initially underwent a colonoscopy which revealed the presence of colon cancer in ascending colon. She underwent resection on October 25, 2018.  Surgical margins were negative, the tumor extended through pericolonic tissues.  There were 19 lymph nodes removed in 1 was metastatic.  Pathologically was a [id number] colon cancer.  After she recovered from surgery, I saw the patient for consultation in November 2018. Because of her high risk for recurrence I recommended adjuvant chemotherapy with FOLFOX over a period of 6 months.  After the first cycle she had neutropenia which required Neupogen and discontinuation of 5-FU injection.  With the second cycle she had a reaction to oxaliplatin requiring steroid premedication the night before and morning of chemotherapy.    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  With this cycle 9, Dr. Morris decided to stop oxaliplatin altogether because of worsening neurotoxicity and other side effects.  The patient did better with 5-FU alone.  Finished adjuvant chemotheapry     5/2019 - 5/2022 restaging showed no evidence of recurrrence,.        Today:  Recovered from COVID 11/2020,  However her  is still having trouble breathing   Lost her daughter in a car accident in 8/2020.  hair loss, dry skin   Fatigue resolved, ECOG 0.    Denies abdominal complaints such as abdominal pain.  Constipation, has been there, resolves with fiber.  ( no red flags).  Weight stable.   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    PAST  MEDICAL HISTORY:   Past Medical History:   Diagnosis Date   • Blood disorder     anemic   • Complication of anesthesia    • Diabetes mellitus (CMS/HCC) ([facility])    • Hypertension    • Malignant neoplasm of ascending colon (CMS/HCC) (HCC) 10/19/2018   • PONV (postoperative nausea and vomiting)    • Shortness of breath     low iron        FAMILY HISTORY:   Family History   Problem Relation Age of Onset   • Hypertension Mother    • COPD Mother    • Hypertension Father    • Brain Aneurysm Sister    • Cancer Maternal Grandmother 36        SOCIAL HISTORY:   Social History     Socioeconomic History   • Marital status:      Spouse name: Not on file   • Number of children: Not on file   • Years of education: Not on file   • Highest education level: Not on file   Occupational History   • Not on file   Tobacco Use   • Smoking status: Never Smoker   • Smokeless tobacco: Never Used   Substance and Sexual Activity   • Alcohol use: Yes     Alcohol/week: 7.0 standard drinks     Types: 7 Glasses of wine per week     Comment: WEEKLY   • Drug use: No   • Sexual activity: Not on file   Other Topics Concern   • Not on file   Social History Narrative   • Not on file     Social Determinants of Health     Financial Resource Strain: Not on file   Food Insecurity: Not on file   Transportation Needs: Not on file   Physical Activity: Not on file   Stress: Not on file   Social Connections: Not on file   Intimate Partner Violence: Not on file   Housing Stability: Not on file        ALLERGIES:   Allergies as of 05/27/2022   • (No Known Allergies)        MEDICATIONS:   Current Outpatient Medications   Medication Sig Dispense Refill   • ascorbic acid, vitamin C, (VITAMIN C) 500 mg tablet Take 500 mg by mouth daily     • losartan (COZAAR) 50 mg tablet Take 1 tablet (50 mg total) by mouth daily 90 tablet 1   • metFORMIN (GLUCOPHAGE) 500 mg tablet Take 1 tablet (500 mg total) by mouth 2 (two) times a day with meals 180 tablet 1   • atorvastatin  (LIPITOR) 20 mg tablet Take 1 tablet (20 mg total) by mouth daily 90 tablet 1   • acetaminophen (TYLENOL) 500 mg tablet Take 2 tablets (1,000 mg total) by mouth every 8 (eight) hours as needed for pain scale 1-3/10.  0   • cholecalciferol, vitamin D3, 25 mcg (1,000 unit) tablet Take 2,000 Units by mouth daily.     • lancets (ACCU-CHEK FASTCLIX) [facility] USE BID TO TEST BLOOD SUGAR LEVELS 204 1   • blood-glucose meter (ACCU-CHEK JOSE MANUEL) misc FPD 1 0     No current facility-administered medications for this visit.       REVIEW OF SYSTEMS:   A 14 point systems review was done.  It was negative with the exception of stable neuropathy involving fingers and mild fatigue    The ECOG performance status today is 0          Objective    PHYSICAL EXAM:   /60   Pulse 88   Temp 36.2 °C (97.1 °F) (Temporal)   Wt 56.9 kg (125 lb 6.4 oz)   LMP  (LMP Unknown) Comment: still has ovaries  SpO2 95%   BMI 22.93 kg/m²    Body mass index is 22.93 kg/m².      Physical Exam  HENT:      Head: Normocephalic.      Nose: Nose normal.   Eyes:      Pupils: Pupils are equal, round, and reactive to light.   Cardiovascular:      Rate and Rhythm: Normal rate.   Pulmonary:      Effort: Pulmonary effort is normal.   Abdominal:      General: Abdomen is flat.   Musculoskeletal:         General: Normal range of motion.   Neurological:      General: No focal deficit present.      Mental Status: She is alert.         DIAGNOSTIC REPORTS REVIEWED:      Appointment on 05/27/2022   Component Date Value Ref Range Status   • WBC 05/27/2022 4.2 (A) 4.5 - 10.5 10*3/uL Final   • RBC 05/27/2022 4.70  3.70 - 5.30 10*6/µL Final   • Hemoglobin 05/27/2022 14.7  11.5 - 15.5 g/dL Final   • Hematocrit 05/27/2022 43.3  34.0 - 45.0 % Final   • MCV 05/27/2022 92.1  81.0 - 97.0 fL Final   • MCH 05/27/2022 31.2  28.0 - 33.0 pg Final   • MCHC 05/27/2022 33.8  32.0 - 36.0 g/dL Final   • RDW 05/27/2022 13.7  11.5 - 14.0 % Final   • Platelets 05/27/2022 190  140 - 350 10*3/uL  Final   • MPV 05/27/2022 7.3  6.9 - 10.8 fL Final   • Neutrophils% 05/27/2022 57  41 - 81 % Final   • Lymphocytes% 05/27/2022 30  11 - 47 % Final   • Monocytes% 05/27/2022 11  3 - 11 % Final   • Eosinophils% 05/27/2022 2  0 - 3 % Final   • Basophils% 05/27/2022 1  0 - 2 % Final   • ANC (auto diff) 05/27/2022 2.40  10*3/UL Final   • Lymphocytes Absolute 05/27/2022 1.30  10*3/uL Final   • Monocytes Absolute 05/27/2022 0.40  10*3/uL Final   • Eosinophils Absolute 05/27/2022 0.10  10*3/uL Final   • Basophils Absolute 05/27/2022 0.00  10*3/uL Final   • Sodium 05/27/2022 142  135 - 145 mmol/L Final   • Potassium 05/27/2022 4.2  3.5 - 5.1 MMOL/L Final   • Chloride 05/27/2022 107  98 - 107 mmol/L Final   • CO2 05/27/2022 28  21 - 32 mmol/L Final   • Anion Gap 05/27/2022 7  3 - 11 mmol/L Final   • BUN 05/27/2022 17  7 - 25 mg/dL Final   • Creatinine 05/27/2022 0.69  0.60 - 1.10 mg/dL Final   • Glucose 05/27/2022 111 (A) 70 - 105 mg/dL Final   • Calcium 05/27/2022 9.6  8.6 - 10.3 mg/dL Final   • AST 05/27/2022 25  <40 U/L Final   • ALT (SGPT) 05/27/2022 19  7 - 52 U/L Final   • Alkaline Phosphatase 05/27/2022 75  55 - 142 U/L Final   • Total Protein 05/27/2022 6.7  6.0 - 8.3 g/dL Final   • Albumin 05/27/2022 4.5  3.5 - 5.3 g/dL Final   • Total Bilirubin 05/27/2022 0.70  0.20 - 1.40 mg/dL Final   • Corrected Calcium 05/27/2022 9.2  8.6 - 10.3 mg/dL Final   • eGFR 05/27/2022 91  >60 mL/min/1.73m*2 Final   • CEA 05/27/2022 1.3  0.0 - 9.9 ng/mL Final              Assessment/Plan    IMPRESSION and PLAN:   This is a 73-year-old female with colon cancer.   Stage III (T3,N1,M0) s/p resection 10/2018  followed by 6 months of adjuvant FOLFOX (oxaliplatin dropped after cycle 9 due to neuropathy)   CT 5/2022 without recurrence.     Plan:   -  6 month follow up with  labs and CEA till 5 years ( till 10/2023), scans yearly next due 5/2023  - colonoscopy done 4/2022 with sessile polyp. Next due in 5 years   - port removed  - discussed  increasing fiber intake. For constipation     Physician: Leyla Olvera MD   On this date of service  I spent a total time of 30 minutes for visit and documentation and coordination of care

## 2022-11-16 DIAGNOSIS — E55.9 VITAMIN D DEFICIENCY: Primary | ICD-10-CM

## 2022-11-16 DIAGNOSIS — E11.9 TYPE 2 DIABETES MELLITUS WITHOUT COMPLICATION, WITHOUT LONG-TERM CURRENT USE OF INSULIN (CMS/HCC): ICD-10-CM

## 2022-11-22 ENCOUNTER — APPOINTMENT (OUTPATIENT)
Dept: ONCOLOGY | Facility: CLINIC | Age: 77
End: 2022-11-22
Payer: MEDICARE

## 2022-11-22 ENCOUNTER — OFFICE VISIT (OUTPATIENT)
Dept: ONCOLOGY | Facility: CLINIC | Age: 77
End: 2022-11-22
Payer: MEDICARE

## 2022-11-22 VITALS
HEART RATE: 71 BPM | DIASTOLIC BLOOD PRESSURE: 76 MMHG | TEMPERATURE: 97.9 F | SYSTOLIC BLOOD PRESSURE: 134 MMHG | OXYGEN SATURATION: 96 % | WEIGHT: 124 LBS | BODY MASS INDEX: 22.67 KG/M2

## 2022-11-22 DIAGNOSIS — E55.9 VITAMIN D DEFICIENCY: ICD-10-CM

## 2022-11-22 DIAGNOSIS — E11.9 TYPE 2 DIABETES MELLITUS WITHOUT COMPLICATION, WITHOUT LONG-TERM CURRENT USE OF INSULIN (CMS/HCC): ICD-10-CM

## 2022-11-22 DIAGNOSIS — C18.2 MALIGNANT NEOPLASM OF ASCENDING COLON (CMS/HCC): ICD-10-CM

## 2022-11-22 LAB
25(OH)D3 SERPL-MCNC: 78 NG/ML (ref 30–100)
ALBUMIN SERPL-MCNC: 4.4 G/DL (ref 3.5–5.3)
ALP SERPL-CCNC: 73 U/L (ref 55–142)
ALT SERPL-CCNC: 21 U/L (ref 7–52)
ANION GAP SERPL CALC-SCNC: 11 MMOL/L (ref 3–11)
AST SERPL-CCNC: 23 U/L
BASOPHILS # BLD AUTO: 0 10*3/UL
BASOPHILS NFR BLD AUTO: 0.6 % (ref 0–2)
BILIRUB SERPL-MCNC: 0.71 MG/DL (ref 0.2–1.4)
BUN SERPL-MCNC: 14 MG/DL (ref 7–25)
CALCIUM ALBUM COR SERPL-MCNC: 9.2 MG/DL (ref 8.6–10.3)
CALCIUM SERPL-MCNC: 9.5 MG/DL (ref 8.6–10.3)
CEA SERPL-MCNC: 1.5 NG/ML (ref 0–9.9)
CHLORIDE SERPL-SCNC: 103 MMOL/L (ref 98–107)
CO2 SERPL-SCNC: 26 MMOL/L (ref 21–32)
CREAT SERPL-MCNC: 0.72 MG/DL (ref 0.6–1.1)
EOSINOPHIL # BLD AUTO: 0 10*3/UL
EOSINOPHIL NFR BLD AUTO: 1.1 % (ref 0–3)
ERYTHROCYTE [DISTWIDTH] IN BLOOD BY AUTOMATED COUNT: 13.5 % (ref 11.5–14)
GFR SERPL CREATININE-BSD FRML MDRD: 87 ML/MIN/1.73M*2
GLUCOSE SERPL-MCNC: 104 MG/DL (ref 70–105)
HCT VFR BLD AUTO: 43.7 % (ref 34–45)
HGB BLD-MCNC: 14.7 G/DL (ref 11.5–15.5)
LYMPHOCYTES # BLD AUTO: 1.5 10*3/UL
LYMPHOCYTES NFR BLD AUTO: 33 % (ref 11–47)
MCH RBC QN AUTO: 30.9 PG (ref 28–33)
MCHC RBC AUTO-ENTMCNC: 33.7 G/DL (ref 32–36)
MCV RBC AUTO: 91.5 FL (ref 81–97)
MONOCYTES # BLD AUTO: 0.4 10*3/UL
MONOCYTES NFR BLD AUTO: 8.7 % (ref 3–11)
NEUTROPHILS # BLD AUTO: 2.6 10*3/UL
NEUTROPHILS NFR BLD AUTO: 56.6 % (ref 41–81)
PLATELET # BLD AUTO: 187 10*3/UL (ref 140–350)
PMV BLD AUTO: 7.5 FL (ref 6.9–10.8)
POTASSIUM SERPL-SCNC: 3.7 MMOL/L (ref 3.5–5.1)
PROT SERPL-MCNC: 6.5 G/DL (ref 6–8.3)
RBC # BLD AUTO: 4.78 10*6/ΜL (ref 3.7–5.3)
SODIUM SERPL-SCNC: 140 MMOL/L (ref 135–145)
TSH SERPL DL<=0.05 MIU/L-ACNC: 2.09 UIU/ML (ref 0.34–4.82)
VIT B12 SERPL-MCNC: 242 PG/ML (ref 180–914)
WBC # BLD AUTO: 4.5 10*3/UL (ref 4.5–10.5)

## 2022-11-22 PROCEDURE — 82378 CARCINOEMBRYONIC ANTIGEN: CPT

## 2022-11-22 PROCEDURE — 82306 VITAMIN D 25 HYDROXY: CPT

## 2022-11-22 PROCEDURE — 84443 ASSAY THYROID STIM HORMONE: CPT

## 2022-11-22 PROCEDURE — 99214 OFFICE O/P EST MOD 30 MIN: CPT | Performed by: INTERNAL MEDICINE

## 2022-11-22 PROCEDURE — 85025 COMPLETE CBC W/AUTO DIFF WBC: CPT

## 2022-11-22 PROCEDURE — 80053 COMPREHEN METABOLIC PANEL: CPT

## 2022-11-22 PROCEDURE — 82607 VITAMIN B-12: CPT

## 2022-11-22 PROCEDURE — 36415 COLL VENOUS BLD VENIPUNCTURE: CPT

## 2022-11-22 PROCEDURE — G0463 HOSPITAL OUTPT CLINIC VISIT: HCPCS

## 2022-11-22 ASSESSMENT — PAIN SCALES - GENERAL: PAINLEVEL: 0-NO PAIN

## 2022-11-22 NOTE — PROGRESS NOTES
Medical Oncology Follow-Up    Date of Service: 11/22/2022    Subjective   HISTORY OF PRESENT ILLNESS:  This is a 73-year-old female with colon cancer.  The patient returns oncology clinic for follow-up and adjuvant chemotherapy.     The patient was diagnosed with colon cancer in October 2018 when she presented with 2-year history of anemia and more recent history of right lower quadrant abdominal pain.  She initially underwent a colonoscopy which revealed the presence of colon cancer in ascending colon. She underwent resection on October 25, 2018.  Surgical margins were negative, the tumor extended through pericolonic tissues.  There were 19 lymph nodes removed in 1 was metastatic.  Pathologically was a [id number] colon cancer.  After she recovered from surgery, I saw the patient for consultation in November 2018. Because of her high risk for recurrence I recommended adjuvant chemotherapy with FOLFOX over a period of 6 months.  After the first cycle she had neutropenia which required Neupogen and discontinuation of 5-FU injection.  With the second cycle she had a reaction to oxaliplatin requiring steroid premedication the night before and morning of chemotherapy.    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  With this cycle 9, Dr. Morris decided to stop oxaliplatin altogether because of worsening neurotoxicity and other side effects.  The patient did better with 5-FU alone.  Finished adjuvant chemotheapry     5/2019 - 5/2022 restaging showed no evidence of recurrrence,.        Today:  Lost her daughter in a car accident in 8/2020.  hair loss, dry skin stable   Fatigue resolved, ECOG 0. walks 2.5 miles a day   Denies abdominal complaints such as abdominal pain.  Constipation, has been there, resolves with fiber.  ( no red flags).  Weight stable.   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    PAST MEDICAL HISTORY:   Past Medical History:   Diagnosis Date     Blood disorder     anemic    Complication of anesthesia     Diabetes mellitus (CMS/HCC) (HCC)     Hypertension     Malignant neoplasm of ascending colon (CMS/HCC) (HCC) 10/19/2018    PONV (postoperative nausea and vomiting)     Shortness of breath     low iron        FAMILY HISTORY:   Family History   Problem Relation Age of Onset    Hypertension Mother     COPD Mother     Hypertension Father     Brain Aneurysm Sister     Cancer Maternal Grandmother 36        SOCIAL HISTORY:   Social History     Socioeconomic History    Marital status:      Spouse name: Not on file    Number of children: Not on file    Years of education: Not on file    Highest education level: Not on file   Occupational History    Not on file   Tobacco Use    Smoking status: Never    Smokeless tobacco: Never   Substance and Sexual Activity    Alcohol use: Yes     Alcohol/week: 7.0 standard drinks     Types: 7 Glasses of wine per week     Comment: WEEKLY    Drug use: No    Sexual activity: Not on file   Other Topics Concern    Not on file   Social History Narrative    Not on file     Social Determinants of Health     Financial Resource Strain: Not on file   Food Insecurity: Not on file   Transportation Needs: Not on file   Physical Activity: Not on file   Stress: Not on file   Social Connections: Not on file   Intimate Partner Violence: Not on file   Housing Stability: Not on file        ALLERGIES:   Allergies as of 11/22/2022    (No Known Allergies)        MEDICATIONS:   Current Outpatient Medications   Medication Sig Dispense Refill    ascorbic acid, vitamin C, (VITAMIN C) 500 mg tablet Take 500 mg by mouth daily      losartan (COZAAR) 50 mg tablet Take 1 tablet (50 mg total) by mouth daily 90 tablet 1    metFORMIN (GLUCOPHAGE) 500 mg tablet Take 1 tablet (500 mg total) by mouth 2 (two) times a day with meals 180 tablet 1    atorvastatin (LIPITOR) 20 mg tablet Take 1 tablet (20 mg total) by mouth daily 90 tablet 1    acetaminophen (TYLENOL)  500 mg tablet Take 2 tablets (1,000 mg total) by mouth every 8 (eight) hours as needed for pain scale 1-3/10.  0    cholecalciferol, vitamin D3, 25 mcg (1,000 unit) tablet Take 2,000 Units by mouth daily.      lancets (ACCU-CHEK FASTCLIX) [facility] [place] USE BID TO TEST BLOOD SUGAR LEVELS 204 1    blood-glucose meter (ACCU-CHEK JOSE MANUEL) misc FPD 1 0     No current facility-administered medications for this visit.       REVIEW OF SYSTEMS:   A 14 point systems review was done.  It was negative with the exception of stable neuropathy involving fingers and mild fatigue    The ECOG performance status today is 0          Objective    PHYSICAL EXAM:   /76   Pulse 71   Temp 36.6 °C (97.9 °F) (Temporal)   Wt 56.2 kg (124 lb)   LMP  (LMP Unknown) Comment: still has ovaries  SpO2 96%   BMI 22.67 kg/m²    Body mass index is 22.67 kg/m².      Physical Exam  HENT:      Head: Normocephalic.      Nose: Nose normal.   Eyes:      Pupils: Pupils are equal, round, and reactive to light.   Cardiovascular:      Rate and Rhythm: Normal rate.   Pulmonary:      Effort: Pulmonary effort is normal.   Abdominal:      General: Abdomen is flat.   Musculoskeletal:         General: Normal range of motion.   Neurological:      General: No focal deficit present.      Mental Status: She is alert.       DIAGNOSTIC REPORTS REVIEWED:      Appointment on 11/22/2022   Component Date Value Ref Range Status    Sodium 11/22/2022 140  135 - 145 mmol/L Final    Potassium 11/22/2022 3.7  3.5 - 5.1 MMOL/L Final    Chloride 11/22/2022 103  98 - 107 mmol/L Final    CO2 11/22/2022 26  21 - 32 mmol/L Final    Anion Gap 11/22/2022 11  3 - 11 mmol/L Final    BUN 11/22/2022 14  7 - 25 mg/dL Final    Creatinine 11/22/2022 0.72  0.60 - 1.10 mg/dL Final    Glucose 11/22/2022 104  70 - 105 mg/dL Final    Calcium 11/22/2022 9.5  8.6 - 10.3 mg/dL Final    AST 11/22/2022 23  <40 U/L Final    ALT (SGPT) 11/22/2022 21  7 - 52 U/L Final    Alkaline Phosphatase 11/22/2022 73  55 -  142 U/L Final    Total Protein 11/22/2022 6.5  6.0 - 8.3 g/dL Final    Albumin 11/22/2022 4.4  3.5 - 5.3 g/dL Final    Total Bilirubin 11/22/2022 0.71  0.20 - 1.40 mg/dL Final    Corrected Calcium 11/22/2022 9.2  8.6 - 10.3 mg/dL Final    eGFR 11/22/2022 87  >60 mL/min/1.73m*2 Final    WBC 11/22/2022 4.5  4.5 - 10.5 10*3/uL Final    RBC 11/22/2022 4.78  3.70 - 5.30 10*6/µL Final    Hemoglobin 11/22/2022 14.7  11.5 - 15.5 g/dL Final    Hematocrit 11/22/2022 43.7  34.0 - 45.0 % Final    MCV 11/22/2022 91.5  81.0 - 97.0 fL Final    MCH 11/22/2022 30.9  28.0 - 33.0 pg Final    MCHC 11/22/2022 33.7  32.0 - 36.0 g/dL Final    RDW 11/22/2022 13.5  11.5 - 14.0 % Final    Platelets 11/22/2022 187  140 - 350 10*3/uL Final    MPV 11/22/2022 7.5  6.9 - 10.8 fL Final    Neutrophils% 11/22/2022 56.6  41.0 - 81.0 % Final    Lymphocytes% 11/22/2022 33.0  11.0 - 47.0 % Final    Monocytes% 11/22/2022 8.7  3.0 - 11.0 % Final    Eosinophils% 11/22/2022 1.1  0.0 - 3.0 % Final    Basophils% 11/22/2022 0.6  0.0 - 2.0 % Final    ANC (auto diff) 11/22/2022 2.60  10*3/UL Final    Lymphocytes Absolute 11/22/2022 1.50  10*3/uL Final    Monocytes Absolute 11/22/2022 0.40  10*3/uL Final    Eosinophils Absolute 11/22/2022 0.00  10*3/uL Final    Basophils Absolute 11/22/2022 0.00  10*3/uL Final    CEA 11/22/2022 1.5  0.0 - 9.9 ng/mL Final    TSH 11/22/2022 2.094  0.340 - 4.820 uIU/ml Final    Vit D, 25-Hydroxy 11/22/2022 78  30 - 100 ng/mL Final    Vitamin B-12 11/22/2022 242  180 - 914 pg/mL Final              Assessment/Plan    IMPRESSION and PLAN:   This is a 73-year-old female with colon cancer.   Stage III (T3,N1,M0) s/p resection 10/2018  followed by 6 months of adjuvant FOLFOX (oxaliplatin dropped after cycle 9 due to neuropathy)   CT 5/2022 without recurrence.   CEA and exam normal 11/2022   Plan:   -  6 month follow up with  labs and CEA till 5 years ( till 10/2023), scans yearly next due 5/2023  - colonoscopy done 4/2022 with sessile  polyp. Next due in 5 years   - port removed  - discussed increasing fiber intake. For constipation     Physician: Leyla Olvera MD   On this date of service  I spent a total time of 30 minutes for visit and documentation and coordination of care

## 2023-05-24 ENCOUNTER — OFFICE VISIT (OUTPATIENT)
Dept: ONCOLOGY | Facility: CLINIC | Age: 78
End: 2023-05-24
Payer: MEDICARE

## 2023-05-24 ENCOUNTER — LAB (OUTPATIENT)
Dept: ONCOLOGY | Facility: CLINIC | Age: 78
End: 2023-05-24
Payer: MEDICARE

## 2023-05-24 ENCOUNTER — HOSPITAL ENCOUNTER (OUTPATIENT)
Dept: CT IMAGING | Facility: HOSPITAL | Age: 78
Discharge: HOME | End: 2023-05-24
Payer: MEDICARE

## 2023-05-24 VITALS
SYSTOLIC BLOOD PRESSURE: 143 MMHG | WEIGHT: 121.6 LBS | OXYGEN SATURATION: 97 % | BODY MASS INDEX: 22.24 KG/M2 | TEMPERATURE: 98.4 F | HEART RATE: 87 BPM | DIASTOLIC BLOOD PRESSURE: 59 MMHG

## 2023-05-24 DIAGNOSIS — C18.2 MALIGNANT NEOPLASM OF ASCENDING COLON (CMS/HCC): ICD-10-CM

## 2023-05-24 LAB
ALBUMIN SERPL-MCNC: 4.4 G/DL (ref 3.5–5.3)
ALP SERPL-CCNC: 68 U/L (ref 55–142)
ALT SERPL-CCNC: 19 U/L (ref 7–52)
ANION GAP SERPL CALC-SCNC: 8 MMOL/L (ref 3–11)
AST SERPL-CCNC: 22 U/L
BASOPHILS # BLD AUTO: 0.1 10*3/UL
BASOPHILS NFR BLD AUTO: 1.4 % (ref 0–2)
BILIRUB SERPL-MCNC: 0.56 MG/DL (ref 0.2–1.4)
BUN SERPL-MCNC: 14 MG/DL (ref 7–25)
CALCIUM ALBUM COR SERPL-MCNC: 8.9 MG/DL (ref 8.6–10.3)
CALCIUM SERPL-MCNC: 9.2 MG/DL (ref 8.6–10.3)
CEA SERPL-MCNC: 1.4 NG/ML (ref 0–9.9)
CHLORIDE SERPL-SCNC: 104 MMOL/L (ref 98–107)
CO2 SERPL-SCNC: 25 MMOL/L (ref 21–32)
CREAT SERPL-MCNC: 0.7 MG/DL (ref 0.6–1.1)
EOSINOPHIL # BLD AUTO: 0.1 10*3/UL
EOSINOPHIL NFR BLD AUTO: 1.7 % (ref 0–3)
ERYTHROCYTE [DISTWIDTH] IN BLOOD BY AUTOMATED COUNT: 14.1 % (ref 11.5–14)
GFR SERPL CREATININE-BSD FRML MDRD: 90 ML/MIN/1.73M*2
GLUCOSE SERPL-MCNC: 114 MG/DL (ref 70–105)
HCT VFR BLD AUTO: 43.2 % (ref 34–45)
HGB BLD-MCNC: 14.7 G/DL (ref 11.5–15.5)
LYMPHOCYTES # BLD AUTO: 1.4 10*3/UL
LYMPHOCYTES NFR BLD AUTO: 30.5 % (ref 11–47)
MCH RBC QN AUTO: 31.2 PG (ref 28–33)
MCHC RBC AUTO-ENTMCNC: 34 G/DL (ref 32–36)
MCV RBC AUTO: 91.7 FL (ref 81–97)
MONOCYTES # BLD AUTO: 0.4 10*3/UL
MONOCYTES NFR BLD AUTO: 9.4 % (ref 3–11)
NEUTROPHILS # BLD AUTO: 2.7 10*3/UL
NEUTROPHILS NFR BLD AUTO: 57 % (ref 41–81)
PLATELET # BLD AUTO: 197 10*3/UL (ref 140–350)
PMV BLD AUTO: 7.2 FL (ref 6.9–10.8)
POTASSIUM SERPL-SCNC: 4 MMOL/L (ref 3.5–5.1)
PROT SERPL-MCNC: 6.4 G/DL (ref 6–8.3)
RBC # BLD AUTO: 4.71 10*6/ΜL (ref 3.7–5.3)
SODIUM SERPL-SCNC: 137 MMOL/L (ref 135–145)
WBC # BLD AUTO: 4.7 10*3/UL (ref 4.5–10.5)

## 2023-05-24 PROCEDURE — G1004 CDSM NDSC: HCPCS

## 2023-05-24 PROCEDURE — 82378 CARCINOEMBRYONIC ANTIGEN: CPT

## 2023-05-24 PROCEDURE — 85025 COMPLETE CBC W/AUTO DIFF WBC: CPT

## 2023-05-24 PROCEDURE — 36415 COLL VENOUS BLD VENIPUNCTURE: CPT

## 2023-05-24 PROCEDURE — G0463 HOSPITAL OUTPT CLINIC VISIT: HCPCS

## 2023-05-24 PROCEDURE — 2550000100 HC RX 255: Performed by: INTERNAL MEDICINE

## 2023-05-24 PROCEDURE — 80053 COMPREHEN METABOLIC PANEL: CPT

## 2023-05-24 PROCEDURE — 99215 OFFICE O/P EST HI 40 MIN: CPT | Performed by: INTERNAL MEDICINE

## 2023-05-24 RX ORDER — IOPAMIDOL 755 MG/ML
70 INJECTION, SOLUTION INTRAVASCULAR ONCE
Status: COMPLETED | OUTPATIENT
Start: 2023-05-24 | End: 2023-05-24

## 2023-05-24 RX ADMIN — IOPAMIDOL 70 ML: 755 INJECTION, SOLUTION INTRAVENOUS at 08:45

## 2023-05-24 ASSESSMENT — PAIN SCALES - GENERAL: PAINLEVEL: 0-NO PAIN

## 2023-05-24 NOTE — PROGRESS NOTES
Medical Oncology Follow-Up    Date of Service: 5/24/2023    Subjective   HISTORY OF PRESENT ILLNESS:  This is a 73-year-old female with colon cancer.  The patient returns oncology clinic for follow-up and adjuvant chemotherapy.     The patient was diagnosed with colon cancer in October 2018 when she presented with 2-year history of anemia and more recent history of right lower quadrant abdominal pain.  She initially underwent a colonoscopy which revealed the presence of colon cancer in ascending colon. She underwent resection on October 25, 2018.  Surgical margins were negative, the tumor extended through pericolonic tissues.  There were 19 lymph nodes removed in 1 was metastatic.  Pathologically was a [id number] colon cancer.  After she recovered from surgery, I saw the patient for consultation in November 2018. Because of her high risk for recurrence I recommended adjuvant chemotherapy with FOLFOX over a period of 6 months.  After the first cycle she had neutropenia which required Neupogen and discontinuation of 5-FU injection.  With the second cycle she had a reaction to oxaliplatin requiring steroid premedication the night before and morning of chemotherapy.    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  With this cycle 9, Dr. Morris decided to stop oxaliplatin altogether because of worsening neurotoxicity and other side effects.  The patient did better with 5-FU alone.  Finished adjuvant chemotheapry     5/2019 - 5/2023 restaging showed no evidence of recurrrence,.        Today:  Lost her daughter in a car accident in 8/2020.   Fatigue resolved, ECOG 0. walks 2.5 miles a day   Denies abdominal complaints such as abdominal pain.  Constipation, has been there, resolves with fiber.  ( no red flags).  Weight stable.   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    PAST MEDICAL HISTORY:   Past Medical History:   Diagnosis Date    Blood disorder     anemic     Complication of anesthesia     Diabetes mellitus (CMS/HCC)     Hypertension     Malignant neoplasm of ascending colon (CMS/HCC) 10/19/2018    PONV (postoperative nausea and vomiting)     Shortness of breath     low iron        FAMILY HISTORY:   Family History   Problem Relation Age of Onset    Hypertension Mother     COPD Mother     Hypertension Father     Brain Aneurysm Sister     Cancer Maternal Grandmother 36        SOCIAL HISTORY:   Social History     Socioeconomic History    Marital status:      Spouse name: Not on file    Number of children: Not on file    Years of education: Not on file    Highest education level: Not on file   Occupational History    Not on file   Tobacco Use    Smoking status: Never    Smokeless tobacco: Never   Substance and Sexual Activity    Alcohol use: Yes     Alcohol/week: 7.0 standard drinks of alcohol     Types: 7 Glasses of wine per week     Comment: WEEKLY    Drug use: No    Sexual activity: Not on file   Other Topics Concern    Not on file   Social History Narrative    Not on file     Social Determinants of Health     Financial Resource Strain: Not on file   Food Insecurity: Not on file   Transportation Needs: Not on file   Physical Activity: Not on file   Stress: Not on file   Social Connections: Not on file   Intimate Partner Violence: Not on file   Housing Stability: Not on file        ALLERGIES:   Allergies as of 05/24/2023    (No Known Allergies)        MEDICATIONS:   Current Outpatient Medications   Medication Sig Dispense Refill    ascorbic acid, vitamin C, (VITAMIN C) 500 mg tablet Take 1 tablet (500 mg total) by mouth daily      losartan (COZAAR) 50 mg tablet Take 1 tablet (50 mg total) by mouth daily 90 tablet 1    metFORMIN (GLUCOPHAGE) 500 mg tablet Take 1 tablet (500 mg total) by mouth 2 (two) times a day with meals 180 tablet 1    atorvastatin (LIPITOR) 20 mg tablet Take 1 tablet (20 mg total) by mouth daily 90 tablet 1    acetaminophen (TYLENOL) 500 mg  tablet Take 2 tablets (1,000 mg total) by mouth every 8 (eight) hours as needed for pain scale 1-3/10.  0    cholecalciferol, vitamin D3, 25 mcg (1,000 unit) tablet Take 2 tablets (2,000 Units total) by mouth daily      lancets (ACCU-CHEK FASTCLIX) [facility] [place] USE BID TO TEST BLOOD SUGAR LEVELS 204 1    blood-glucose meter (ACCU-CHEK JOSE MANUEL) misc FPD 1 0     No current facility-administered medications for this visit.       REVIEW OF SYSTEMS:   A 14 point systems review was done.  It was negative with the exception of stable neuropathy involving fingers and mild fatigue    The ECOG performance status today is 0          Objective    PHYSICAL EXAM:   /59   Pulse 87   Temp 36.9 °C (98.4 °F) (Temporal)   Wt 55.2 kg (121 lb 9.6 oz)   LMP  (LMP Unknown) Comment: still has ovaries  SpO2 97%   BMI 22.24 kg/m²    Body mass index is 22.24 kg/m².      Physical Exam  HENT:      Head: Normocephalic.      Nose: Nose normal.   Eyes:      Pupils: Pupils are equal, round, and reactive to light.   Cardiovascular:      Rate and Rhythm: Normal rate.   Pulmonary:      Effort: Pulmonary effort is normal.   Abdominal:      General: Abdomen is flat.   Musculoskeletal:         General: Normal range of motion.   Neurological:      General: No focal deficit present.      Mental Status: She is alert.       DIAGNOSTIC REPORTS REVIEWED:      Lab on 05/24/2023   Component Date Value Ref Range Status    WBC 05/24/2023 4.7  4.5 - 10.5 10*3/uL Final    RBC 05/24/2023 4.71  3.70 - 5.30 10*6/µL Final    Hemoglobin 05/24/2023 14.7  11.5 - 15.5 g/dL Final    Hematocrit 05/24/2023 43.2  34.0 - 45.0 % Final    MCV 05/24/2023 91.7  81.0 - 97.0 fL Final    MCH 05/24/2023 31.2  28.0 - 33.0 pg Final    MCHC 05/24/2023 34.0  32.0 - 36.0 g/dL Final    RDW 05/24/2023 14.1 (H)  11.5 - 14.0 % Final    Platelets 05/24/2023 197  140 - 350 10*3/uL Final    MPV 05/24/2023 7.2  6.9 - 10.8 fL Final    Neutrophils% 05/24/2023 57.0  41.0 - 81.0 % Final    Lymphocytes%  05/24/2023 30.5  11.0 - 47.0 % Final    Monocytes% 05/24/2023 9.4  3.0 - 11.0 % Final    Eosinophils% 05/24/2023 1.7  0.0 - 3.0 % Final    Basophils% 05/24/2023 1.4  0.0 - 2.0 % Final    ANC (auto diff) 05/24/2023 2.70  10*3/UL Final    Lymphocytes Absolute 05/24/2023 1.40  10*3/uL Final    Monocytes Absolute 05/24/2023 0.40  10*3/uL Final    Eosinophils Absolute 05/24/2023 0.10  10*3/uL Final    Basophils Absolute 05/24/2023 0.10  10*3/uL Final    Sodium 05/24/2023 137  135 - 145 mmol/L Final    Potassium 05/24/2023 4.0  3.5 - 5.1 MMOL/L Final    Chloride 05/24/2023 104  98 - 107 mmol/L Final    CO2 05/24/2023 25  21 - 32 mmol/L Final    Anion Gap 05/24/2023 8  3 - 11 mmol/L Final    BUN 05/24/2023 14  7 - 25 mg/dL Final    Creatinine 05/24/2023 0.70  0.60 - 1.10 mg/dL Final    Glucose 05/24/2023 114 (H)  70 - 105 mg/dL Final    Calcium 05/24/2023 9.2  8.6 - 10.3 mg/dL Final    AST 05/24/2023 22  <40 U/L Final    ALT (SGPT) 05/24/2023 19  7 - 52 U/L Final    Alkaline Phosphatase 05/24/2023 68  55 - 142 U/L Final    Total Protein 05/24/2023 6.4  6.0 - 8.3 g/dL Final    Albumin 05/24/2023 4.4  3.5 - 5.3 g/dL Final    Total Bilirubin 05/24/2023 0.56  0.20 - 1.40 mg/dL Final    Corrected Calcium 05/24/2023 8.9  8.6 - 10.3 mg/dL Final    eGFR 05/24/2023 90  >60 mL/min/1.73m*2 Final    CEA 05/24/2023 1.4  0.0 - 9.9 ng/mL Final              Assessment/Plan    IMPRESSION and PLAN:   This is a 73-year-old female with colon cancer.   Stage III (T3,N1,M0) s/p resection 10/2018  followed by 6 months of adjuvant FOLFOX (oxaliplatin dropped after cycle 9 due to neuropathy)   CT 5/2023  without recurrence. Reviewed scans with the patient   CEA and exam normal 11/2022   Plan:   -  6 month follow up with  labs and CEA till 5 years ( till 11/2023 ), scans yearly next due 11/2023 , and will follow up as needed afterwards   - colonoscopy done 4/2022 with sessile polyp. Next due in 5 years   - port removed  - discussed increasing fiber  intake. For constipation     Physician: Leyla Olvera MD

## 2023-10-27 DIAGNOSIS — E08.36: Primary | ICD-10-CM

## 2023-10-27 DIAGNOSIS — E55.9 VITAMIN D DEFICIENCY, UNSPECIFIED: ICD-10-CM

## 2023-10-27 DIAGNOSIS — E53.8 VITAMIN B12 DEFICIENCY: ICD-10-CM

## 2023-11-15 ENCOUNTER — LAB (OUTPATIENT)
Dept: ONCOLOGY | Facility: CLINIC | Age: 78
End: 2023-11-15
Payer: MEDICARE

## 2023-11-15 ENCOUNTER — HOSPITAL ENCOUNTER (OUTPATIENT)
Dept: CT IMAGING | Facility: HOSPITAL | Age: 78
Discharge: HOME | End: 2023-11-15
Payer: MEDICARE

## 2023-11-15 ENCOUNTER — OFFICE VISIT (OUTPATIENT)
Dept: ONCOLOGY | Facility: CLINIC | Age: 78
End: 2023-11-15
Payer: MEDICARE

## 2023-11-15 VITALS
DIASTOLIC BLOOD PRESSURE: 64 MMHG | WEIGHT: 120.4 LBS | OXYGEN SATURATION: 97 % | TEMPERATURE: 97.6 F | BODY MASS INDEX: 22.02 KG/M2 | SYSTOLIC BLOOD PRESSURE: 171 MMHG | HEART RATE: 75 BPM

## 2023-11-15 DIAGNOSIS — C18.2 MALIGNANT NEOPLASM OF ASCENDING COLON (CMS/HCC): ICD-10-CM

## 2023-11-15 DIAGNOSIS — E53.8 VITAMIN B12 DEFICIENCY: ICD-10-CM

## 2023-11-15 DIAGNOSIS — E55.9 VITAMIN D DEFICIENCY, UNSPECIFIED: ICD-10-CM

## 2023-11-15 DIAGNOSIS — E08.36: ICD-10-CM

## 2023-11-15 LAB
25(OH)D3 SERPL-MCNC: 59 NG/ML (ref 30–100)
ALBUMIN SERPL-MCNC: 4.3 G/DL (ref 3.5–5.3)
ALP SERPL-CCNC: 65 U/L (ref 55–142)
ALT SERPL-CCNC: 15 U/L (ref 7–52)
ANION GAP SERPL CALC-SCNC: 10 MMOL/L (ref 3–11)
AST SERPL-CCNC: 19 U/L
BASOPHILS # BLD AUTO: 0 10*3/UL
BASOPHILS NFR BLD AUTO: 0.9 % (ref 0–2)
BILIRUB SERPL-MCNC: 0.49 MG/DL (ref 0.2–1.4)
BUN SERPL-MCNC: 22 MG/DL (ref 7–25)
CALCIUM ALBUM COR SERPL-MCNC: 9.8 MG/DL (ref 8.6–10.3)
CALCIUM SERPL-MCNC: 10 MG/DL (ref 8.6–10.3)
CEA SERPL-MCNC: 1.1 NG/ML (ref 0–9.9)
CHLORIDE SERPL-SCNC: 106 MMOL/L (ref 98–107)
CO2 SERPL-SCNC: 26 MMOL/L (ref 21–32)
CREAT SERPL-MCNC: 0.75 MG/DL (ref 0.6–1.1)
EGFRCR SERPLBLD CKD-EPI 2021: 82 ML/MIN/1.73M*2
EOSINOPHIL # BLD AUTO: 0.1 10*3/UL
EOSINOPHIL NFR BLD AUTO: 3.2 % (ref 0–3)
ERYTHROCYTE [DISTWIDTH] IN BLOOD BY AUTOMATED COUNT: 13.6 % (ref 11.5–14)
GLUCOSE SERPL-MCNC: 122 MG/DL (ref 70–105)
HCT VFR BLD AUTO: 43.5 % (ref 34–45)
HGB BLD-MCNC: 14.7 G/DL (ref 11.5–15.5)
LYMPHOCYTES # BLD AUTO: 1.2 10*3/UL
LYMPHOCYTES NFR BLD AUTO: 28.3 % (ref 11–47)
MCH RBC QN AUTO: 31.1 PG (ref 28–33)
MCHC RBC AUTO-ENTMCNC: 33.9 G/DL (ref 32–36)
MCV RBC AUTO: 91.8 FL (ref 81–97)
MONOCYTES # BLD AUTO: 0.4 10*3/UL
MONOCYTES NFR BLD AUTO: 10 % (ref 3–11)
NEUTROPHILS # BLD AUTO: 2.4 10*3/UL
NEUTROPHILS NFR BLD AUTO: 57.6 % (ref 41–81)
PLATELET # BLD AUTO: 199 10*3/UL (ref 140–350)
PMV BLD AUTO: 7.2 FL (ref 6.9–10.8)
POTASSIUM SERPL-SCNC: 3.6 MMOL/L (ref 3.5–5.1)
PROT SERPL-MCNC: 6.5 G/DL (ref 6–8.3)
RBC # BLD AUTO: 4.74 10*6/ΜL (ref 3.7–5.3)
SODIUM SERPL-SCNC: 142 MMOL/L (ref 135–145)
T4 FREE SERPL-MCNC: 0.95 NG/DL (ref 0.65–1.44)
TSH SERPL DL<=0.05 MIU/L-ACNC: 1.89 UIU/ML (ref 0.34–4.82)
VIT B12 SERPL-MCNC: >1500 PG/ML (ref 180–914)
WBC # BLD AUTO: 4.2 10*3/UL (ref 4.5–10.5)

## 2023-11-15 PROCEDURE — 82306 VITAMIN D 25 HYDROXY: CPT

## 2023-11-15 PROCEDURE — G1004 CDSM NDSC: HCPCS

## 2023-11-15 PROCEDURE — 99214 OFFICE O/P EST MOD 30 MIN: CPT | Performed by: INTERNAL MEDICINE

## 2023-11-15 PROCEDURE — 84480 ASSAY TRIIODOTHYRONINE (T3): CPT

## 2023-11-15 PROCEDURE — 36415 COLL VENOUS BLD VENIPUNCTURE: CPT

## 2023-11-15 PROCEDURE — 80053 COMPREHEN METABOLIC PANEL: CPT

## 2023-11-15 PROCEDURE — G0463 HOSPITAL OUTPT CLINIC VISIT: HCPCS

## 2023-11-15 PROCEDURE — 84439 ASSAY OF FREE THYROXINE: CPT

## 2023-11-15 PROCEDURE — 2550000100 HC RX 255: Mod: JZ | Performed by: INTERNAL MEDICINE

## 2023-11-15 PROCEDURE — 82607 VITAMIN B-12: CPT

## 2023-11-15 PROCEDURE — 85025 COMPLETE CBC W/AUTO DIFF WBC: CPT

## 2023-11-15 PROCEDURE — 84443 ASSAY THYROID STIM HORMONE: CPT

## 2023-11-15 PROCEDURE — 82378 CARCINOEMBRYONIC ANTIGEN: CPT

## 2023-11-15 RX ORDER — IOPAMIDOL 755 MG/ML
70 INJECTION, SOLUTION INTRAVASCULAR ONCE
Status: COMPLETED | OUTPATIENT
Start: 2023-11-15 | End: 2023-11-15

## 2023-11-15 RX ADMIN — IOPAMIDOL 70 ML: 755 INJECTION, SOLUTION INTRAVENOUS at 08:45

## 2023-11-15 ASSESSMENT — PAIN SCALES - GENERAL: PAINLEVEL: 0-NO PAIN

## 2023-11-15 NOTE — PROGRESS NOTES
Medical Oncology Follow-Up    Date of Service: 11/15/2023    Subjective   HISTORY OF PRESENT ILLNESS:  This is a 78 y.o.  female with colon cancer.       The patient was diagnosed with colon cancer in October 2018 when she presented with 2-year history of anemia and more recent history of right lower quadrant abdominal pain.  She initially underwent a colonoscopy which revealed the presence of colon cancer in ascending colon. She underwent resection on October 25, 2018.  Surgical margins were negative, the tumor extended through pericolonic tissues.  There were 19 lymph nodes removed in 1 was metastatic.  Pathologically was a [id number] colon cancer.  After she recovered from surgery, I saw the patient for consultation in November 2018. Because of her high risk for recurrence I recommended adjuvant chemotherapy with FOLFOX over a period of 6 months.  After the first cycle she had neutropenia which required Neupogen and discontinuation of 5-FU injection.  With the second cycle she had a reaction to oxaliplatin requiring steroid premedication the night before and morning of chemotherapy.    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  With this cycle 9, Dr. Morris decided to stop oxaliplatin altogether because of worsening neurotoxicity and other side effects.  The patient did better with 5-FU alone.  Finished adjuvant chemotheapry     5/2019 - 11/2023  restaging showed no evidence of recurrrence,.        Today:  Lost her daughter in a car accident in 8/2020.    Denies abdominal complaints such as abdominal pain.  Constipation, has been there, resolves with fiber.  ( no red flags).  Weight stable.   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    PAST MEDICAL HISTORY:   Past Medical History:   Diagnosis Date    Blood disorder     anemic    Complication of anesthesia     Diabetes mellitus (CMS/HCC)     Hypertension     Malignant neoplasm of ascending colon  (CMS/[facility]) 10/19/2018    PONV (postoperative nausea and vomiting)     Shortness of breath     low iron        FAMILY HISTORY:   Family History   Problem Relation Age of Onset    Hypertension Mother     COPD Mother     Hypertension Father     Brain Aneurysm Sister     Cancer Maternal Grandmother 36        SOCIAL HISTORY:   Social History     Socioeconomic History    Marital status:      Spouse name: Not on file    Number of children: Not on file    Years of education: Not on file    Highest education level: Not on file   Occupational History    Not on file   Tobacco Use    Smoking status: Never    Smokeless tobacco: Never   Substance and Sexual Activity    Alcohol use: Yes     Alcohol/week: 7.0 standard drinks of alcohol     Types: 7 Glasses of wine per week     Comment: WEEKLY    Drug use: No    Sexual activity: Not on file   Other Topics Concern    Not on file   Social History Narrative    Not on file     Social Determinants of Health     Financial Resource Strain: Not on file   Food Insecurity: Not on file   Transportation Needs: Not on file   Physical Activity: Not on file   Stress: Not on file   Social Connections: Not on file   Intimate Partner Violence: Not on file   Housing Stability: Not on file        ALLERGIES:   Allergies as of 11/15/2023    (No Known Allergies)        MEDICATIONS:   Current Outpatient Medications   Medication Sig Dispense Refill    ascorbic acid, vitamin C, (VITAMIN C) 500 mg tablet Take 1 tablet (500 mg total) by mouth daily      losartan (COZAAR) 50 mg tablet Take 1 tablet (50 mg total) by mouth daily 90 tablet 1    metFORMIN (GLUCOPHAGE) 500 mg tablet Take 1 tablet (500 mg total) by mouth 2 (two) times a day with meals 180 tablet 1    atorvastatin (LIPITOR) 20 mg tablet Take 1 tablet (20 mg total) by mouth daily 90 tablet 1    acetaminophen (TYLENOL) 500 mg tablet Take 2 tablets (1,000 mg total) by mouth every 8 (eight) hours as needed for pain scale 1-3/10.  0     cholecalciferol, vitamin D3, 25 mcg (1,000 unit) tablet Take 2 tablets (2,000 Units total) by mouth daily      lancets (ACCU-CHEK FASTCLIX) [facility] [place] USE BID TO TEST BLOOD SUGAR LEVELS 204 1    blood-glucose meter (ACCU-CHEK JOSE MANUEL) misc FPD 1 0     No current facility-administered medications for this visit.       REVIEW OF SYSTEMS:   A 14 point systems review was done.  It was negative with the exception of stable neuropathy involving fingers and mild fatigue    The ECOG performance status today is 0          Objective    PHYSICAL EXAM:   /64   Pulse 75   Temp 36.4 °C (97.6 °F) (Temporal)   Wt 54.6 kg (120 lb 6.4 oz)   LMP  (LMP Unknown) Comment: still has ovaries  SpO2 97%   BMI 22.02 kg/m²    Body mass index is 22.02 kg/m².      Physical Exam  HENT:      Head: Normocephalic.      Nose: Nose normal.   Eyes:      Pupils: Pupils are equal, round, and reactive to light.   Cardiovascular:      Rate and Rhythm: Normal rate.   Pulmonary:      Effort: Pulmonary effort is normal.   Abdominal:      General: Abdomen is flat.   Musculoskeletal:         General: Normal range of motion.   Neurological:      General: No focal deficit present.      Mental Status: She is alert.         DIAGNOSTIC REPORTS REVIEWED:      Lab on 11/15/2023   Component Date Value Ref Range Status    WBC 11/15/2023 4.2 (L)  4.5 - 10.5 10*3/uL Final    RBC 11/15/2023 4.74  3.70 - 5.30 10*6/µL Final    Hemoglobin 11/15/2023 14.7  11.5 - 15.5 g/dL Final    Hematocrit 11/15/2023 43.5  34.0 - 45.0 % Final    MCV 11/15/2023 91.8  81.0 - 97.0 fL Final    MCH 11/15/2023 31.1  28.0 - 33.0 pg Final    MCHC 11/15/2023 33.9  32.0 - 36.0 g/dL Final    RDW 11/15/2023 13.6  11.5 - 14.0 % Final    Platelets 11/15/2023 199  140 - 350 10*3/uL Final    MPV 11/15/2023 7.2  6.9 - 10.8 fL Final    Neutrophils% 11/15/2023 57.6  41.0 - 81.0 % Final    Lymphocytes% 11/15/2023 28.3  11.0 - 47.0 % Final    Monocytes% 11/15/2023 10.0  3.0 - 11.0 % Final    Eosinophils%  11/15/2023 3.2 (H)  0.0 - 3.0 % Final    Basophils% 11/15/2023 0.9  0.0 - 2.0 % Final    ANC (auto diff) 11/15/2023 2.40  10*3/UL Final    Lymphocytes Absolute 11/15/2023 1.20  10*3/uL Final    Monocytes Absolute 11/15/2023 0.40  10*3/uL Final    Eosinophils Absolute 11/15/2023 0.10  10*3/uL Final    Basophils Absolute 11/15/2023 0.00  10*3/uL Final    Sodium 11/15/2023 142  135 - 145 mmol/L Final    Potassium 11/15/2023 3.6  3.5 - 5.1 MMOL/L Final    Chloride 11/15/2023 106  98 - 107 mmol/L Final    CO2 11/15/2023 26  21 - 32 mmol/L Final    Anion Gap 11/15/2023 10  3 - 11 mmol/L Final    BUN 11/15/2023 22  7 - 25 mg/dL Final    Creatinine 11/15/2023 0.75  0.60 - 1.10 mg/dL Final    Glucose 11/15/2023 122 (H)  70 - 105 mg/dL Final    Calcium 11/15/2023 10.0  8.6 - 10.3 mg/dL Final    AST 11/15/2023 19  <40 U/L Final    ALT (SGPT) 11/15/2023 15  7 - 52 U/L Final    Alkaline Phosphatase 11/15/2023 65  55 - 142 U/L Final    Total Protein 11/15/2023 6.5  6.0 - 8.3 g/dL Final    Albumin 11/15/2023 4.3  3.5 - 5.3 g/dL Final    Total Bilirubin 11/15/2023 0.49  0.20 - 1.40 mg/dL Final    Corrected Calcium 11/15/2023 9.8  8.6 - 10.3 mg/dL Final    eGFR 11/15/2023 82  >60 mL/min/1.73m*2 Final    CEA 11/15/2023 1.1  0.0 - 9.9 ng/mL Final    TSH 11/15/2023 1.890  0.340 - 4.820 uIU/ml Final    Free T4 11/15/2023 0.95  0.65 - 1.44 ng/dL Final    Vit D, 25-Hydroxy 11/15/2023 59  30 - 100 ng/mL Final    Vitamin B-12 11/15/2023 >1,500 (H)  180 - 914 pg/mL Final              Assessment/Plan    IMPRESSION and PLAN:   This is a 78 y.o. year-old female with colon cancer.   Stage III (T3,N1,M0) s/p resection 10/2018  followed by 6 months of adjuvant FOLFOX (oxaliplatin dropped after cycle 9 due to neuropathy)   CT 11/2023  without recurrence. Reviewed scans with the patient        Plan:   - finished 5 years of followup without recurrence   - follow up as needed   - colonoscopy done 4/2022 with sessile polyp. Next due in 5 years   - port  removed  - discussed increasing fiber intake. For constipation     Physician: Leyla Olvera MD

## 2023-11-18 LAB — T3 SERPL IA-MCNC: 73 NG/DL (ref 80–200)

## (undated) DEVICE — SUTURE VICRYL 2-0 SH

## (undated) DEVICE — Device

## (undated) DEVICE — SOL SOD CHL IRR .9% 1000ML BTL USP PLASTIC POUR BOTTLE

## (undated) DEVICE — CANNULA FIX 5MM STD UNIVERSAL

## (undated) DEVICE — FORCEP BPSY HOT ALLIG TOOTH 2.8MMX230CM

## (undated) DEVICE — STAPLER TA 60 3.5

## (undated) DEVICE — PAD BOVIE ADULT 9' CORD REM ELECTRODE PATIENT RETURN

## (undated) DEVICE — HANDLE SUCTION CONTROL

## (undated) DEVICE — TROCAR BLUNT TIP 12 X 100MM BALLOON HASSON

## (undated) DEVICE — SOL LAC RING INJ 1000ML BAG USP VIAFLEX PLASTIC CONTAINER

## (undated) DEVICE — MARKER SKIN DUAL TIP STL PENSCRIBE W/RULER/9 LABELS  1 PEN 2 TIPS

## (undated) DEVICE — CLOSURE SKIN STERISTRIP 1/2" 3M

## (undated) DEVICE — GLOVE SENSICARE SLT 7.5 SURG STL POWDER FREE

## (undated) DEVICE — DRAPE C-ARM 3M 60X42

## (undated) DEVICE — RELOAD GIA 80 3.8

## (undated) DEVICE — RELOAD GIA 60MM TAN

## (undated) DEVICE — PACK BASIC

## (undated) DEVICE — BENZOIN TINCTURE COMPOUND

## (undated) DEVICE — SPONGE VISTEC 4X4 STL 10'S

## (undated) DEVICE — TOWEL DISP BLUE STL 4 PK

## (undated) DEVICE — GOWN IMPERVIOUS BLUE UNIVERSAL KNIT CUFF

## (undated) DEVICE — TUBING SUCTION 3/16"X10'

## (undated) DEVICE — CATH IV 20G 1 1/4" SAFETY

## (undated) DEVICE — GOWN SURGICAL XL LEVEL 4

## (undated) DEVICE — TRAY LAP CHOLE CUSTOM SPRH CUSTOM PACK

## (undated) DEVICE — PREP SKIN CHLORAPREP ORNG 26ML STL

## (undated) DEVICE — SUTURE SILK 3-0 SH 30

## (undated) DEVICE — CARTRIDGE GRASPER 5MM 38CM 150

## (undated) DEVICE — PENCIL ELECTROSURGICAL ROCKER SWITCH - TEFLON COATED

## (undated) DEVICE — DRESSING TEGADERM 4X4 3/4

## (undated) DEVICE — SUTURE VICRYL 3-0 SH 27" UNDYED

## (undated) DEVICE — SOL SOD CHL IRR .9% 250ML BTL USP PLASTIC POUR BOTTLE

## (undated) DEVICE — STAPLER ENDO GIA STD HANDLE

## (undated) DEVICE — BLADE #15 SAFETY STL USE W/HANDLE 374030

## (undated) DEVICE — BLADE PROTECTED SS #10 USE W/ITEM#0059760

## (undated) DEVICE — SUTURE PROLENE 2-0 36" SH BLUE DA

## (undated) DEVICE — BLADE SCISSOR TIP SWITCH METZ

## (undated) DEVICE — SUCTION YANKAUER

## (undated) DEVICE — GLOVE SENSICARE GREEN 7.0 SURG @ CHANGE TO 124269

## (undated) DEVICE — CATH IV 18G 1 1/4" SAFETY

## (undated) DEVICE — COVER SITE RITE WITH GEL ULTRASOUND PROBE STL

## (undated) DEVICE — STAPLER GIA 60 3.8

## (undated) DEVICE — GLOVE SENSICARE SLT 7.0 SURG

## (undated) DEVICE — SOL SOD CHL INJ .9% 1000ML BAG USP VIAFLEX PLASTIC CONTAINER

## (undated) DEVICE — DRAPE CHEST BREAST STL THORA 77X106X122" 11X14 FENESTRATED

## (undated) DEVICE — NEEDLE BLUNT FILL 18G 1.5

## (undated) DEVICE — GARMENT CALF 18" MED GREEN VASOPRESS

## (undated) DEVICE — NEEDLE ECLIPSE 22G 1 1/2IN SAFETY

## (undated) DEVICE — PAD BOVIE ADULT 15' CORD REM ELECTRODE 15' CORD

## (undated) DEVICE — JAW SEALER DIVIDER VESSEL MARYLAND LIGASURE

## (undated) DEVICE — TUBE SET INSUFFLATION ISC CONNECTOR/DISP

## (undated) DEVICE — SYRINGE 35CC LUER LOCK TIP STL MONOJECT

## (undated) DEVICE — SUTURE VICRYL 0 UR6 27" VIOLET

## (undated) DEVICE — PENCIL ROCKER SMOKE EVAC ROCKER-SWITCH COATED

## (undated) DEVICE — SUTURE VICRYL 4-0 FS-2 27

## (undated) DEVICE — SUTURE SILK 2-0 SH 30

## (undated) DEVICE — SYRINGE 12CC LUER LOCK TIP STL MONOJECT

## (undated) DEVICE — MANIFOLD 4 PORT NEPTUNE

## (undated) DEVICE — GUARD NEEDLE MAT DISP FOAM NEEDLE COUNTER

## (undated) DEVICE — TRAY SURESTEP CATH STR TIP 16F 350ML URINE METER

## (undated) DEVICE — SPONGE GAUZE 4X4-12 STL 10'S

## (undated) DEVICE — BLADE PROTECTED SS #11 USE W/ITEM #0059760

## (undated) DEVICE — BLANKET WARMING UPPER BODY @ HYPOTHERMIA FORCED AIR

## (undated) DEVICE — COVER LIGHT HANDLE ST FLEX 2PK

## (undated) DEVICE — DRAPE TOWEL ADHESIVE 19X30"STL TIBURON UTILITY TRACH

## (undated) DEVICE — TROCAR BLADELESS 5MM VERSAPORT OPTICAL WITH FIXATION CANNULA

## (undated) DEVICE — SPONGE LAP 18X18" W LOOP STL

## (undated) DEVICE — SYRINGE EAR & ULCER 2 OZ NEONATAL

## (undated) DEVICE — GOWN SURG IMPERVIOUS XLG ASTOUND SLEEVE

## (undated) DEVICE — GLOVE SURG SZ 7 GREEN W/ ALOE VERA

## (undated) DEVICE — SUTURE PDSII 0 TP-1 60" LOOP VIOLET

## (undated) DEVICE — KIT ENDO PROCEDURE CARRY ON